# Patient Record
Sex: FEMALE | Race: OTHER | HISPANIC OR LATINO | Employment: UNEMPLOYED | URBAN - METROPOLITAN AREA
[De-identification: names, ages, dates, MRNs, and addresses within clinical notes are randomized per-mention and may not be internally consistent; named-entity substitution may affect disease eponyms.]

---

## 2023-10-01 ENCOUNTER — HOSPITAL ENCOUNTER (EMERGENCY)
Facility: HOSPITAL | Age: 48
Discharge: HOME | End: 2023-10-01

## 2023-10-01 VITALS — TEMPERATURE: 98.4 F | WEIGHT: 200 LBS | BODY MASS INDEX: 32.14 KG/M2 | HEIGHT: 66 IN

## 2023-10-27 ENCOUNTER — HOSPITAL ENCOUNTER (EMERGENCY)
Facility: HOSPITAL | Age: 48
Discharge: HOME | End: 2023-10-28
Payer: COMMERCIAL

## 2023-10-27 VITALS
BODY MASS INDEX: 32.28 KG/M2 | OXYGEN SATURATION: 99 % | HEIGHT: 66 IN | TEMPERATURE: 97.4 F | SYSTOLIC BLOOD PRESSURE: 96 MMHG | HEART RATE: 98 BPM | RESPIRATION RATE: 18 BRPM | DIASTOLIC BLOOD PRESSURE: 69 MMHG

## 2023-10-27 LAB
ALBUMIN SERPL BCP-MCNC: 4.5 G/DL (ref 3.4–5)
ALP SERPL-CCNC: 61 U/L (ref 33–110)
ALT SERPL W P-5'-P-CCNC: 12 U/L (ref 7–45)
ANION GAP SERPL CALC-SCNC: 14 MMOL/L (ref 10–20)
AST SERPL W P-5'-P-CCNC: 22 U/L (ref 9–39)
BASOPHILS # BLD AUTO: 0.07 X10*3/UL (ref 0–0.1)
BASOPHILS NFR BLD AUTO: 0.6 %
BILIRUB SERPL-MCNC: 0.5 MG/DL (ref 0–1.2)
BUN SERPL-MCNC: 15 MG/DL (ref 6–23)
CALCIUM SERPL-MCNC: 10.3 MG/DL (ref 8.6–10.6)
CHLORIDE SERPL-SCNC: 100 MMOL/L (ref 98–107)
CO2 SERPL-SCNC: 30 MMOL/L (ref 21–32)
CREAT SERPL-MCNC: 1 MG/DL (ref 0.5–1.05)
EOSINOPHIL # BLD AUTO: 0.01 X10*3/UL (ref 0–0.7)
EOSINOPHIL NFR BLD AUTO: 0.1 %
ERYTHROCYTE [DISTWIDTH] IN BLOOD BY AUTOMATED COUNT: 13.6 % (ref 11.5–14.5)
GFR SERPL CREATININE-BSD FRML MDRD: 71 ML/MIN/1.73M*2
GLUCOSE SERPL-MCNC: 83 MG/DL (ref 74–99)
HCT VFR BLD AUTO: 38.5 % (ref 36–46)
HGB BLD-MCNC: 13.4 G/DL (ref 12–16)
IMM GRANULOCYTES # BLD AUTO: 0.04 X10*3/UL (ref 0–0.7)
IMM GRANULOCYTES NFR BLD AUTO: 0.3 % (ref 0–0.9)
LYMPHOCYTES # BLD AUTO: 2.26 X10*3/UL (ref 1.2–4.8)
LYMPHOCYTES NFR BLD AUTO: 19.1 %
MCH RBC QN AUTO: 28 PG (ref 26–34)
MCHC RBC AUTO-ENTMCNC: 34.8 G/DL (ref 32–36)
MCV RBC AUTO: 80 FL (ref 80–100)
MONOCYTES # BLD AUTO: 0.64 X10*3/UL (ref 0.1–1)
MONOCYTES NFR BLD AUTO: 5.4 %
NEUTROPHILS # BLD AUTO: 8.84 X10*3/UL (ref 1.2–7.7)
NEUTROPHILS NFR BLD AUTO: 74.5 %
NRBC BLD-RTO: 0 /100 WBCS (ref 0–0)
PLATELET # BLD AUTO: 348 X10*3/UL (ref 150–450)
PMV BLD AUTO: 11.2 FL (ref 7.5–11.5)
POTASSIUM SERPL-SCNC: 4 MMOL/L (ref 3.5–5.3)
PROT SERPL-MCNC: 8 G/DL (ref 6.4–8.2)
RBC # BLD AUTO: 4.79 X10*6/UL (ref 4–5.2)
SODIUM SERPL-SCNC: 140 MMOL/L (ref 136–145)
WBC # BLD AUTO: 11.9 X10*3/UL (ref 4.4–11.3)

## 2023-10-27 PROCEDURE — 36415 COLL VENOUS BLD VENIPUNCTURE: CPT | Performed by: EMERGENCY MEDICINE

## 2023-10-27 PROCEDURE — 99281 EMR DPT VST MAYX REQ PHY/QHP: CPT

## 2023-10-27 PROCEDURE — 85025 COMPLETE CBC W/AUTO DIFF WBC: CPT | Performed by: EMERGENCY MEDICINE

## 2023-10-27 PROCEDURE — 80053 COMPREHEN METABOLIC PANEL: CPT | Performed by: EMERGENCY MEDICINE

## 2023-10-27 PROCEDURE — 99283 EMERGENCY DEPT VISIT LOW MDM: CPT

## 2023-10-27 ASSESSMENT — COLUMBIA-SUICIDE SEVERITY RATING SCALE - C-SSRS
6. HAVE YOU EVER DONE ANYTHING, STARTED TO DO ANYTHING, OR PREPARED TO DO ANYTHING TO END YOUR LIFE?: NO
1. IN THE PAST MONTH, HAVE YOU WISHED YOU WERE DEAD OR WISHED YOU COULD GO TO SLEEP AND NOT WAKE UP?: NO
2. HAVE YOU ACTUALLY HAD ANY THOUGHTS OF KILLING YOURSELF?: NO

## 2023-10-27 NOTE — ED TRIAGE NOTES
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

## 2024-03-08 ENCOUNTER — HOSPITAL ENCOUNTER (EMERGENCY)
Facility: HOSPITAL | Age: 49
Discharge: OTHER INSTITUTION | End: 2024-03-10
Attending: EMERGENCY MEDICINE
Payer: COMMERCIAL

## 2024-03-08 ENCOUNTER — APPOINTMENT (OUTPATIENT)
Dept: CARDIOLOGY | Facility: HOSPITAL | Age: 49
End: 2024-03-08
Payer: COMMERCIAL

## 2024-03-08 DIAGNOSIS — F20.9 SCHIZOPHRENIA, UNSPECIFIED TYPE (MULTI): Primary | ICD-10-CM

## 2024-03-08 DIAGNOSIS — E87.6 HYPOKALEMIA: ICD-10-CM

## 2024-03-08 LAB
ALBUMIN SERPL BCP-MCNC: 3.8 G/DL (ref 3.4–5)
ALP SERPL-CCNC: 51 U/L (ref 33–110)
ALT SERPL W P-5'-P-CCNC: 7 U/L (ref 7–45)
ANION GAP SERPL CALC-SCNC: 10 MMOL/L (ref 10–20)
APAP SERPL-MCNC: <10 UG/ML
AST SERPL W P-5'-P-CCNC: 16 U/L (ref 9–39)
BASOPHILS # BLD AUTO: 0.04 X10*3/UL (ref 0–0.1)
BASOPHILS NFR BLD AUTO: 0.5 %
BILIRUB SERPL-MCNC: 0.3 MG/DL (ref 0–1.2)
BUN SERPL-MCNC: 11 MG/DL (ref 6–23)
CALCIUM SERPL-MCNC: 7.4 MG/DL (ref 8.6–10.3)
CHLORIDE SERPL-SCNC: 105 MMOL/L (ref 98–107)
CO2 SERPL-SCNC: 30 MMOL/L (ref 21–32)
CREAT SERPL-MCNC: 0.71 MG/DL (ref 0.5–1.05)
EGFRCR SERPLBLD CKD-EPI 2021: >90 ML/MIN/1.73M*2
EOSINOPHIL # BLD AUTO: 0.01 X10*3/UL (ref 0–0.7)
EOSINOPHIL NFR BLD AUTO: 0.1 %
ERYTHROCYTE [DISTWIDTH] IN BLOOD BY AUTOMATED COUNT: 12.5 % (ref 11.5–14.5)
ETHANOL SERPL-MCNC: <10 MG/DL
FLUAV RNA RESP QL NAA+PROBE: NOT DETECTED
FLUBV RNA RESP QL NAA+PROBE: NOT DETECTED
GLUCOSE SERPL-MCNC: 96 MG/DL (ref 74–99)
HCT VFR BLD AUTO: 34.7 % (ref 36–46)
HGB BLD-MCNC: 11.8 G/DL (ref 12–16)
HOLD SPECIMEN: NORMAL
IMM GRANULOCYTES # BLD AUTO: 0.02 X10*3/UL (ref 0–0.7)
IMM GRANULOCYTES NFR BLD AUTO: 0.2 % (ref 0–0.9)
LYMPHOCYTES # BLD AUTO: 2.3 X10*3/UL (ref 1.2–4.8)
LYMPHOCYTES NFR BLD AUTO: 28.4 %
MCH RBC QN AUTO: 28.9 PG (ref 26–34)
MCHC RBC AUTO-ENTMCNC: 34 G/DL (ref 32–36)
MCV RBC AUTO: 85 FL (ref 80–100)
MONOCYTES # BLD AUTO: 0.61 X10*3/UL (ref 0.1–1)
MONOCYTES NFR BLD AUTO: 7.5 %
NEUTROPHILS # BLD AUTO: 5.11 X10*3/UL (ref 1.2–7.7)
NEUTROPHILS NFR BLD AUTO: 63.3 %
NRBC BLD-RTO: 0 /100 WBCS (ref 0–0)
PLATELET # BLD AUTO: 255 X10*3/UL (ref 150–450)
POTASSIUM SERPL-SCNC: 2.8 MMOL/L (ref 3.5–5.3)
PROT SERPL-MCNC: 6.6 G/DL (ref 6.4–8.2)
RBC # BLD AUTO: 4.08 X10*6/UL (ref 4–5.2)
SALICYLATES SERPL-MCNC: <3 MG/DL
SARS-COV-2 RNA RESP QL NAA+PROBE: NOT DETECTED
SODIUM SERPL-SCNC: 142 MMOL/L (ref 136–145)
WBC # BLD AUTO: 8.1 X10*3/UL (ref 4.4–11.3)

## 2024-03-08 PROCEDURE — 2500000002 HC RX 250 W HCPCS SELF ADMINISTERED DRUGS (ALT 637 FOR MEDICARE OP, ALT 636 FOR OP/ED): Performed by: EMERGENCY MEDICINE

## 2024-03-08 PROCEDURE — 99285 EMERGENCY DEPT VISIT HI MDM: CPT

## 2024-03-08 PROCEDURE — 80143 DRUG ASSAY ACETAMINOPHEN: CPT | Performed by: EMERGENCY MEDICINE

## 2024-03-08 PROCEDURE — 80053 COMPREHEN METABOLIC PANEL: CPT | Performed by: EMERGENCY MEDICINE

## 2024-03-08 PROCEDURE — 85025 COMPLETE CBC W/AUTO DIFF WBC: CPT | Performed by: EMERGENCY MEDICINE

## 2024-03-08 PROCEDURE — 36415 COLL VENOUS BLD VENIPUNCTURE: CPT | Performed by: EMERGENCY MEDICINE

## 2024-03-08 PROCEDURE — 93005 ELECTROCARDIOGRAM TRACING: CPT

## 2024-03-08 PROCEDURE — 96372 THER/PROPH/DIAG INJ SC/IM: CPT

## 2024-03-08 PROCEDURE — 87636 SARSCOV2 & INF A&B AMP PRB: CPT | Performed by: EMERGENCY MEDICINE

## 2024-03-08 PROCEDURE — 2500000004 HC RX 250 GENERAL PHARMACY W/ HCPCS (ALT 636 FOR OP/ED): Performed by: EMERGENCY MEDICINE

## 2024-03-08 RX ORDER — TRAZODONE HYDROCHLORIDE 50 MG/1
50 TABLET ORAL NIGHTLY
COMMUNITY

## 2024-03-08 RX ORDER — POTASSIUM CHLORIDE 20 MEQ/1
40 TABLET, EXTENDED RELEASE ORAL ONCE
Status: COMPLETED | OUTPATIENT
Start: 2024-03-08 | End: 2024-03-08

## 2024-03-08 RX ORDER — OLANZAPINE 10 MG/1
10 TABLET ORAL NIGHTLY
COMMUNITY

## 2024-03-08 RX ORDER — DIVALPROEX SODIUM 500 MG/1
1000 TABLET, DELAYED RELEASE ORAL NIGHTLY
COMMUNITY

## 2024-03-08 RX ORDER — ZIPRASIDONE MESYLATE 20 MG/ML
20 INJECTION, POWDER, LYOPHILIZED, FOR SOLUTION INTRAMUSCULAR ONCE
Status: COMPLETED | OUTPATIENT
Start: 2024-03-08 | End: 2024-03-08

## 2024-03-08 RX ADMIN — POTASSIUM CHLORIDE 40 MEQ: 1500 TABLET, EXTENDED RELEASE ORAL at 21:47

## 2024-03-08 RX ADMIN — ZIPRASIDONE MESYLATE 20 MG: 20 INJECTION, POWDER, LYOPHILIZED, FOR SOLUTION INTRAMUSCULAR at 19:52

## 2024-03-08 SDOH — HEALTH STABILITY: MENTAL HEALTH: NEEDS EXPRESSED: DENIES

## 2024-03-08 SDOH — HEALTH STABILITY: MENTAL HEALTH: DELUSIONS: RELIGIOUS

## 2024-03-08 SDOH — HEALTH STABILITY: MENTAL HEALTH: HAVE YOU EVER DONE ANYTHING, STARTED TO DO ANYTHING, OR PREPARED TO DO ANYTHING TO END YOUR LIFE?: NO

## 2024-03-08 SDOH — HEALTH STABILITY: MENTAL HEALTH: BEHAVIORS/MOOD: CALM

## 2024-03-08 SDOH — HEALTH STABILITY: MENTAL HEALTH: HAVE YOU ACTUALLY HAD ANY THOUGHTS OF KILLING YOURSELF?: NO

## 2024-03-08 SDOH — HEALTH STABILITY: MENTAL HEALTH: SUICIDE ASSESSMENT: ADULT (C-SSRS)

## 2024-03-08 SDOH — HEALTH STABILITY: MENTAL HEALTH: BEHAVIORS/MOOD: DEFIANT;FLAT AFFECT;GUARDED;NONVERBAL;NON-COMPLIANT;NOT INTERACTIVE;UNCOOPERATIVE

## 2024-03-08 SDOH — HEALTH STABILITY: MENTAL HEALTH: BEHAVIORS/MOOD: DEFIANT;GUARDED;NON-COMPLIANT;UNCOOPERATIVE;WITHDRAWN;MANIPULATIVE

## 2024-03-08 SDOH — HEALTH STABILITY: MENTAL HEALTH: BEHAVIORS/MOOD: DEFIANT;GUARDED;NON-COMPLIANT;NOT INTERACTIVE;UNCOOPERATIVE;MANIPULATIVE

## 2024-03-08 SDOH — HEALTH STABILITY: MENTAL HEALTH: HALLUCINATION: AUDITORY

## 2024-03-08 SDOH — HEALTH STABILITY: MENTAL HEALTH: BEHAVIORS/MOOD: ANXIOUS;DEFIANT;FEARFUL;GUARDED;PARANOID;PACING;SAD;NON-COMPLIANT;UNCOOPERATIVE

## 2024-03-08 SDOH — HEALTH STABILITY: MENTAL HEALTH: HAVE YOU WISHED YOU WERE DEAD OR WISHED YOU COULD GO TO SLEEP AND NOT WAKE UP?: NO

## 2024-03-08 ASSESSMENT — COLUMBIA-SUICIDE SEVERITY RATING SCALE - C-SSRS
1. IN THE PAST MONTH, HAVE YOU WISHED YOU WERE DEAD OR WISHED YOU COULD GO TO SLEEP AND NOT WAKE UP?: NO
2. HAVE YOU ACTUALLY HAD ANY THOUGHTS OF KILLING YOURSELF?: NO
6. HAVE YOU EVER DONE ANYTHING, STARTED TO DO ANYTHING, OR PREPARED TO DO ANYTHING TO END YOUR LIFE?: NO

## 2024-03-08 ASSESSMENT — LIFESTYLE VARIABLES
EVER HAD A DRINK FIRST THING IN THE MORNING TO STEADY YOUR NERVES TO GET RID OF A HANGOVER: NO
EVER FELT BAD OR GUILTY ABOUT YOUR DRINKING: NO
HAVE PEOPLE ANNOYED YOU BY CRITICIZING YOUR DRINKING: NO
HAVE YOU EVER FELT YOU SHOULD CUT DOWN ON YOUR DRINKING: NO

## 2024-03-08 ASSESSMENT — PAIN - FUNCTIONAL ASSESSMENT: PAIN_FUNCTIONAL_ASSESSMENT: 0-10

## 2024-03-08 ASSESSMENT — PAIN SCALES - GENERAL: PAINLEVEL_OUTOF10: 0 - NO PAIN

## 2024-03-08 NOTE — ED PROVIDER NOTES
"HPI   Chief Complaint   Patient presents with    Psychiatric Evaluation     Pt BIBA for [facility] after calling family and stating that everyone is going to die and it will all be their faults - Pt stated that she was also going to die today then left the home she shares with her mom & sister to walk to the hospital - PD were called by family who caught up with her on her walk to take her the rest of the way here - Pt usually goes [facility] - Pt's mom states that this happens a lot when she is not taking her meds        Patient is a 48-year-old female, medical history significant for Arroyo's disease and schizophrenia, presents to the emergency department abnormal behavior.  Apparently, the patient's mother called police today.  She has been noncompliant with her medications.  She had been saying some abnormal things like \"today everyone is going to die\".  Just acting erratically.  She states that she has not been taking any of her psychiatric medications.  She has been compliant with her Arroyo's medications.  She denies nausea or vomiting.  Patient would not give much history.                          [place] Coma Scale Score: 15                     Patient History   No past medical history on file.  No past surgical history on file.  No family history on file.  Social History     Tobacco Use    Smoking status: Not on file    Smokeless tobacco: Not on file   Substance Use Topics    Alcohol use: Not on file    Drug use: Not on file       Physical Exam   ED Triage Vitals   Temp Pulse Resp BP   -- -- -- --      SpO2 Temp src Heart Rate Source Patient Position   -- -- -- --      BP Location FiO2 (%)     -- --       Physical Exam  Vitals and nursing note reviewed.   Constitutional:       General: She is not in acute distress.     Appearance: She is well-developed.   HENT:      Head: Normocephalic and atraumatic.   Eyes:      Conjunctiva/sclera: Conjunctivae normal.   Cardiovascular:      Rate and Rhythm: Normal rate " and regular rhythm.      Heart sounds: No murmur heard.  Pulmonary:      Effort: Pulmonary effort is normal. No respiratory distress.      Breath sounds: Normal breath sounds.   Abdominal:      Palpations: Abdomen is soft.      Tenderness: There is no abdominal tenderness.   Musculoskeletal:         General: No swelling.      Cervical back: Neck supple.   Skin:     General: Skin is warm and dry.      Capillary Refill: Capillary refill takes less than 2 seconds.   Neurological:      Mental Status: She is alert.   Psychiatric:         Mood and Affect: Mood normal. Affect is flat.         Speech: Speech is delayed.         Behavior: Behavior is withdrawn.         ED Course & MDM   ED Course as of 03/08/24 2341   Fri Mar 08, 2024   2130 CBC unremarkable. [MK]   2130 Metabolic panel demonstrates hypokalemia which will be replaced. []   2130 Toxicology negative. []   2208 COVID and flu negative. []      ED Course User Index  [MK] Omar Hassan MD         Diagnoses as of 03/08/24 2341   Schizophrenia, unspecified type (CMS/HCC)   Hypokalemia       Medical Decision Making  Medical Decision Making: Patient presents to the emergency department with abnormal behavior.  Per family, she has not been compliant with her medications.  She has been acting erratically.  Initially, patient did attempt to refuse care, but I do not feel she has capacity to make decisions and is not safe.  She was given Geodon and metabolic workup was pursued.  She is found to be hypokalemic which was replaced.  She has no tachycardia.  Her renal function is normal.  This does not represent Melchor's crisis.  At this point, patient is medically cleared for inpatient psychiatric hospitalization.    EKG interpreted by myself (ED attending physician): Sinus rhythm.  Rate of 80.  Normal axis and intervals.  Mildly prolonged QT, unchanged from prior.    Differential Diagnoses Considered: Decompensated schizophrenia, medication noncompliance,  Melchor's crisis    Chronic Medical Conditions Significantly Affecting Care: Schizophrenia, Melcohr's disease    External Records Reviewed: I reviewed recent and relevant outside records including: Multiple recent emergency department evaluations    Independent Interpretation of Studies:  I independently interpreted: No imaging    Escalation of Care:  Appropriate for EPAT evaluation    Social Determinants of Health Significantly Affecting Care:  Medication noncompliant    Prescription Drug Consideration: Geodon, potassium    Diagnostic testing considered: Noncontributory    Discussion of Management with Other Providers:   I discussed the patient/results with: [admitting team, consultant, radiologist, social work, EPAT, case management, PT/OT, RT, PCP, etc.]          Procedure  Procedures     Omar Hassan MD  03/08/24 0336

## 2024-03-09 LAB
AMPHETAMINES UR QL SCN: NORMAL
ANION GAP SERPL CALC-SCNC: 12 MMOL/L (ref 10–20)
BARBITURATES UR QL SCN: NORMAL
BENZODIAZ UR QL SCN: NORMAL
BUN SERPL-MCNC: 13 MG/DL (ref 6–23)
BZE UR QL SCN: NORMAL
CALCIUM SERPL-MCNC: 8 MG/DL (ref 8.6–10.3)
CANNABINOIDS UR QL SCN: NORMAL
CHLORIDE SERPL-SCNC: 103 MMOL/L (ref 98–107)
CO2 SERPL-SCNC: 31 MMOL/L (ref 21–32)
CREAT SERPL-MCNC: 0.81 MG/DL (ref 0.5–1.05)
EGFRCR SERPLBLD CKD-EPI 2021: >90 ML/MIN/1.73M*2
FENTANYL+NORFENTANYL UR QL SCN: NORMAL
GLUCOSE SERPL-MCNC: 90 MG/DL (ref 74–99)
HCG UR QL IA.RAPID: NEGATIVE
METHADONE UR QL SCN: NORMAL
OPIATES UR QL SCN: NORMAL
OXYCODONE+OXYMORPHONE UR QL SCN: NORMAL
PCP UR QL SCN: NORMAL
POTASSIUM SERPL-SCNC: 3.6 MMOL/L (ref 3.5–5.3)
SODIUM SERPL-SCNC: 142 MMOL/L (ref 136–145)

## 2024-03-09 PROCEDURE — 81025 URINE PREGNANCY TEST: CPT | Performed by: EMERGENCY MEDICINE

## 2024-03-09 PROCEDURE — 80307 DRUG TEST PRSMV CHEM ANLYZR: CPT | Performed by: EMERGENCY MEDICINE

## 2024-03-09 PROCEDURE — 82310 ASSAY OF CALCIUM: CPT | Performed by: EMERGENCY MEDICINE

## 2024-03-09 PROCEDURE — 2500000002 HC RX 250 W HCPCS SELF ADMINISTERED DRUGS (ALT 637 FOR MEDICARE OP, ALT 636 FOR OP/ED): Performed by: INTERNAL MEDICINE

## 2024-03-09 PROCEDURE — 2500000004 HC RX 250 GENERAL PHARMACY W/ HCPCS (ALT 636 FOR OP/ED): Performed by: INTERNAL MEDICINE

## 2024-03-09 PROCEDURE — 2500000001 HC RX 250 WO HCPCS SELF ADMINISTERED DRUGS (ALT 637 FOR MEDICARE OP): Performed by: INTERNAL MEDICINE

## 2024-03-09 PROCEDURE — 36415 COLL VENOUS BLD VENIPUNCTURE: CPT | Performed by: EMERGENCY MEDICINE

## 2024-03-09 RX ORDER — HYDROCORTISONE 10 MG/1
15 TABLET ORAL DAILY
Status: DISCONTINUED | OUTPATIENT
Start: 2024-03-09 | End: 2024-03-10 | Stop reason: HOSPADM

## 2024-03-09 RX ORDER — LORAZEPAM 2 MG/1
2 TABLET ORAL ONCE AS NEEDED
Status: COMPLETED | OUTPATIENT
Start: 2024-03-09 | End: 2024-03-10

## 2024-03-09 RX ORDER — FLUDROCORTISONE ACETATE 0.1 MG/1
0.1 TABLET ORAL DAILY
Status: DISCONTINUED | OUTPATIENT
Start: 2024-03-09 | End: 2024-03-10 | Stop reason: HOSPADM

## 2024-03-09 RX ORDER — OLANZAPINE 10 MG/2ML
10 INJECTION, POWDER, FOR SOLUTION INTRAMUSCULAR ONCE AS NEEDED
Status: DISCONTINUED | OUTPATIENT
Start: 2024-03-09 | End: 2024-03-10 | Stop reason: HOSPADM

## 2024-03-09 RX ORDER — LORAZEPAM 2 MG/ML
2 INJECTION INTRAMUSCULAR ONCE AS NEEDED
Status: COMPLETED | OUTPATIENT
Start: 2024-03-09 | End: 2024-03-10

## 2024-03-09 RX ORDER — POTASSIUM CHLORIDE 20 MEQ/1
40 TABLET, EXTENDED RELEASE ORAL ONCE
Status: COMPLETED | OUTPATIENT
Start: 2024-03-09 | End: 2024-03-09

## 2024-03-09 RX ORDER — LANOLIN ALCOHOL/MO/W.PET/CERES
800 CREAM (GRAM) TOPICAL ONCE
Status: COMPLETED | OUTPATIENT
Start: 2024-03-09 | End: 2024-03-09

## 2024-03-09 RX ORDER — CALCITRIOL 0.25 UG/1
0.5 CAPSULE ORAL DAILY
Status: DISCONTINUED | OUTPATIENT
Start: 2024-03-09 | End: 2024-03-10 | Stop reason: HOSPADM

## 2024-03-09 RX ORDER — OLANZAPINE 5 MG/1
10 TABLET ORAL ONCE
Status: DISCONTINUED | OUTPATIENT
Start: 2024-03-09 | End: 2024-03-10 | Stop reason: HOSPADM

## 2024-03-09 RX ADMIN — CALCITRIOL CAPSULES 0.25 MCG 0.5 MCG: 0.25 CAPSULE ORAL at 12:21

## 2024-03-09 RX ADMIN — HYDROCORTISONE 15 MG: 10 TABLET ORAL at 12:21

## 2024-03-09 RX ADMIN — FLUDROCORTISONE ACETATE 0.1 MG: 0.1 TABLET ORAL at 12:23

## 2024-03-09 RX ADMIN — MAGNESIUM OXIDE TAB 400 MG (241.3 MG ELEMENTAL MG) 800 MG: 400 (241.3 MG) TAB at 10:10

## 2024-03-09 RX ADMIN — POTASSIUM CHLORIDE 40 MEQ: 1500 TABLET, EXTENDED RELEASE ORAL at 10:10

## 2024-03-09 SDOH — HEALTH STABILITY: MENTAL HEALTH: BEHAVIORS/MOOD: CALM;COOPERATIVE

## 2024-03-09 SDOH — HEALTH STABILITY: MENTAL HEALTH: BEHAVIORS/MOOD: ANXIOUS;COOPERATIVE

## 2024-03-09 SDOH — HEALTH STABILITY: MENTAL HEALTH: ANXIETY SYMPTOMS: NO PROBLEMS REPORTED OR OBSERVED.

## 2024-03-09 SDOH — SOCIAL STABILITY: SOCIAL NETWORK: PARENT/GUARDIAN/SIGNIFICANT OTHER INVOLVEMENT: NO INVOLVEMENT

## 2024-03-09 SDOH — HEALTH STABILITY: MENTAL HEALTH: IN THE PAST FEW WEEKS, HAVE YOU FELT THAT YOU OR YOUR FAMILY WOULD BE BETTER OFF IF YOU WERE DEAD?: NO

## 2024-03-09 SDOH — HEALTH STABILITY: MENTAL HEALTH: BEHAVIORS/MOOD: CALM

## 2024-03-09 SDOH — HEALTH STABILITY: MENTAL HEALTH: HALLUCINATION: OTHER (COMMENT)

## 2024-03-09 SDOH — HEALTH STABILITY: MENTAL HEALTH: SLEEP PATTERN: DISTURBED/INTERRUPTED SLEEP;RESTLESSNESS

## 2024-03-09 SDOH — HEALTH STABILITY: MENTAL HEALTH: DELUSIONS: RELIGIOUS

## 2024-03-09 SDOH — HEALTH STABILITY: MENTAL HEALTH: BEHAVIORS/MOOD: CALM;CONGRUENT

## 2024-03-09 SDOH — HEALTH STABILITY: MENTAL HEALTH: NON-SPECIFIC ACTIVE SUICIDAL THOUGHTS (PAST 1 MONTH): NO

## 2024-03-09 SDOH — HEALTH STABILITY: MENTAL HEALTH: BEHAVIORS/MOOD: SLEEPING

## 2024-03-09 SDOH — HEALTH STABILITY: MENTAL HEALTH: HAVE YOU WISHED YOU WERE DEAD OR WISHED YOU COULD GO TO SLEEP AND NOT WAKE UP?: NO

## 2024-03-09 SDOH — HEALTH STABILITY: MENTAL HEALTH: HAVE YOU EVER DONE ANYTHING, STARTED TO DO ANYTHING, OR PREPARED TO DO ANYTHING TO END YOUR LIFE?: NO

## 2024-03-09 SDOH — HEALTH STABILITY: MENTAL HEALTH: ARE YOU HAVING THOUGHTS OF KILLING YOURSELF RIGHT NOW?: NO

## 2024-03-09 SDOH — HEALTH STABILITY: MENTAL HEALTH: IN THE PAST FEW WEEKS, HAVE YOU WISHED YOU WERE DEAD?: NO

## 2024-03-09 SDOH — ECONOMIC STABILITY: HOUSING INSECURITY: FEELS SAFE LIVING IN HOME: YES

## 2024-03-09 SDOH — HEALTH STABILITY: MENTAL HEALTH: CONTENT: RELIGIOSITY

## 2024-03-09 SDOH — ECONOMIC STABILITY: GENERAL: FINANCIAL CONCERNS: UNABLE TO MEET BASIC NEEDS

## 2024-03-09 SDOH — HEALTH STABILITY: MENTAL HEALTH: BEHAVIORS/MOOD: ANXIOUS;COOPERATIVE;IMPULSIVE;PACING

## 2024-03-09 SDOH — HEALTH STABILITY: MENTAL HEALTH: HAVE YOU ACTUALLY HAD ANY THOUGHTS OF KILLING YOURSELF?: NO

## 2024-03-09 SDOH — HEALTH STABILITY: MENTAL HEALTH: BEHAVIORS/MOOD: IMPULSIVE;PACING;DELUSIONS

## 2024-03-09 SDOH — HEALTH STABILITY: MENTAL HEALTH: BEHAVIORS/MOOD: CONGRUENT;COOPERATIVE

## 2024-03-09 SDOH — HEALTH STABILITY: MENTAL HEALTH: SUICIDAL BEHAVIOR (LIFETIME): NO

## 2024-03-09 SDOH — HEALTH STABILITY: MENTAL HEALTH: DEPRESSION SYMPTOMS: NO PROBLEMS REPORTED OR OBSERVED.

## 2024-03-09 SDOH — HEALTH STABILITY: MENTAL HEALTH: HAVE YOU EVER TRIED TO KILL YOURSELF?: NO

## 2024-03-09 SDOH — HEALTH STABILITY: MENTAL HEALTH: WISH TO BE DEAD (PAST 1 MONTH): NO

## 2024-03-09 SDOH — HEALTH STABILITY: MENTAL HEALTH: IN THE PAST WEEK, HAVE YOU BEEN HAVING THOUGHTS ABOUT KILLING YOURSELF?: NO

## 2024-03-09 ASSESSMENT — LIFESTYLE VARIABLES
PRESCIPTION_ABUSE_PAST_12_MONTHS: NO
SUBSTANCE_ABUSE_PAST_12_MONTHS: NO

## 2024-03-09 ASSESSMENT — PAIN SCALES - GENERAL
PAINLEVEL_OUTOF10: 0 - NO PAIN
PAINLEVEL_OUTOF10: 0 - NO PAIN

## 2024-03-09 NOTE — PROGRESS NOTES
Emergency Medicine Transition of Care Note.    I received Kriss Malloy in signout from Dr. Zhu.  Please see the previous ED provider note for all HPI, PE and MDM up to the time of signout at 0700. This is in addition to the primary record.    In brief Kriss Malloy is an 48 y.o. female presenting for   Chief Complaint   Patient presents with    Psychiatric Evaluation     Pt BIBA for pych eval after calling family and stating that everyone is going to die and it will all be their faults - Pt stated that she was also going to die today then left the home she shares with her mom & sister to walk to the hospital - PD were called by family who caught up with her on her walk to take her the rest of the way here - Pt usually goes to [facility] - Pt's mom states that this happens a lot when she is not taking her meds      At the time of signout we were awaiting: Evaluation by EPAT.    ED Course as of 03/09/24 1558   Fri Mar 08, 2024   2130 CBC unremarkable. [MK]   2130 Metabolic panel demonstrates hypokalemia which will be replaced. [MK]   2130 Toxicology negative. [MK]   2208 COVID and flu negative. [MK]   Sat Mar 09, 2024   1110 Reassessed patient.  Patient pacing in the room.  Concern for manic behavior.  Patient states she is been taking medication for [place]'s disease.  Unclear as to whether or not the patient is taking other medication.  Patient mentions Voodoo affecting decision making this time. [JA]   1111 Discussed with EPAT and they will work on placement for the patient. [JA]      ED Course User Index  [JA] Waylon Booker DO  [MK] Omar Hassan MD         Diagnoses as of 03/09/24 1558   Schizophrenia, unspecified type (CMS/HCC)   Hypokalemia       Medical Decision Making  Reassessed patient.  Patient with manic behavior.  Patient pacing in room and repeating herself.    Patient denies suicidal ideation or homicidal ideation however he has not been taking her medication.  Patient notes she only  "takes her medication for Melchor's disease.    Capacity Assessment Tool    \"Capacity\" is the \"ability\" to make a decision.  The decision in question must be specific (one decision), relevant to a patient's current condition (appropriate), and timely (neither prospective nor retrospective).    Capacity varies based on knowledge base (explanation/understanding of clinical information), cognitive processing, acute psychiatric illness, and other clinical conditions.    In order to be deemed \"capacitated\" to make a single decision at one point in time, a patient must demonstrate all 4 of the following elements:    *Ability to consistently communicate a choice (consistent over time with adequate information)  *Ability to understand the relevant information (accurate knowledge of condition)  *Ability to appreciate the situation and its consequences (risks/benefits, pros/cons)  *Ability to reason about treatment options (without undue influence of a person or condition, eg. suicidality or acute psychosis)      Current Decision    Clinical issue:   Danger to herself    Did the appropriate team address relevant information with the patient:  Yes    Date: 3/9/24    If \"NO\" is selected for appropriate team, then please discuss with the appropriate team.  The appropriate team should be encouraged to address relevant information with the patient AND reevaluate capacity when appropriate.    Capacity Evaluation    Patient demonstrates ability to consistently communicate choice:  Yes patient able to communicate wishes    Patient demonstrates ability to understand the relevant information:  No, unclear as to whether or not patient comprehends events    Patient demonstrates ability to appreciate the situation and its consequences:  No, patient does not appreciate detriment of not taking her medication    Patient demonstrates ability to reason about treatment options:  No, patient does not see difference between treatment options    If " "ANY of the above items are answered \"NO,\" the patient LACKS CAPACITY for that specific decision at hand, at that specific time.  Further capacity evaluations can be done as needed.           Patient care transferred to oncoming physician pending placement by EPAT.    Final diagnoses:   [F20.9] Schizophrenia, unspecified type (CMS/[facility])   [E87.6] Hypokalemia           Procedure  Procedures    Waylon Booker DO   "

## 2024-03-09 NOTE — PROGRESS NOTES
"EPAT - Social Work Psychiatric Assessment    Arrival Details  Mode of Arrival: Ambulatory  Admission Source: Emergency department  Admission Type: Voluntary  EPAT Assessment Start Date: 03/09/24  EPAT Assessment Start Time: 0822  Name of : HEIDY Sweet MSSA    History of Present Illness  Admission Reason: Psych Eval  HPI: Patient is 47yo female presenting to ED for psych eval. Reviewed chart hx and provider note. Patient mental health history includes schizophrenia. Patient is [id number] complaint. Patient also presents with Addisions disease with hx of diasease being uncorntrolled. Patient typically follows at [facility]. No outpatient services at this time. Patient negative for substances. Denies SI/HI/AH/VH. Patient here today due to sister calling police as she reports patient has been declining mentally. Patient has history of inpatient admissions, with last admission a year ago for psychosis. [place] low risk.     Readmission Information   Readmission within 30 Days: No    Psychiatric Symptoms  Anxiety Symptoms: No problems reported or observed.  Depression Symptoms: No problems reported or observed.  Sherrie Symptoms: Flight of ideas    Psychosis Symptoms  Hallucination Type: No problems reported or observed.  Delusion Type: No problems reported or observed.    Additional Symptoms - Adult  Generalized Anxiety Disorder: No problems reported or observed.  Obsessive Compulsive Disorder: No problems reported or observed.  Panic Attack: No problems reported or observed.  Post Traumatic Stress Disorder: Traumatic event  Delirium: No problems reported or observed.    Past Psychiatric History/Meds/Treatments  Past Psychiatric History: Schizophrenia  Past Psychiatric Meds/Treatments: \"Many Meds\" patient does not recall names  Past Violence/Victimization History: Patient reports when she was little-    Current Mental Health Contacts   Name/Phone Number: None reported   Last Appointment " Date: none reported  Provider Name/Phone Number: none reported  Provider Last Appointment Date: none reported    Support System: Immediate family    Living Arrangement: House, Lives with someone    Home Safety  Feels Safe Living in Home: Yes  Potentially Unsafe Housing Conditions: Other (Comment) (none)  Home Safety : none reported    Income Information  Employment Status for: Patient  Employment Status: Unemployed  Income Source: Unemployed  Current/Previous Occupation: Healthcare  Income/Expense Information: Expenses exceed income  Financial Concerns: Unable to Meet Basic Needs  Who Manages Finances if Patient Unable: Patient  Employment/ Finance Comments: Patient gets money from parents/family    Miltary Service/Education History  Current or Previous  Service: None   Experience:  (none)  Education Level: High school  History of Learning Problems: No  History of School Behavior Problems: No  School History: unreported    Social/Cultural History  Social History: Patient is 47yo AA female,presents avergae to [facility] average weight, appears stated age, hair dishelveled, hospital gowns  Cultural Requests During Hospitalization: unreported  Spiritual Requests During Hospitalization: patient reports recently joing an [employer]  Important Activities: Family, Social    Legal  Legal Considerations: Patient/ Family Ability to Make Healthcare Decisions  Assistance with Managing/Advocating Healthcare Needs: Other (Comment) (n/a)  Criminal Activity/ Legal Involvement Pertinent to Current Situation/ Hospitalization: none  Legal Concerns: unreported  Legal Comments: unreported    Drug Screening  Have you used any substances (canabis, cocaine, heroin, hallucinogens, inhalants, etc.) in the past 12 months?: No  Have you used any prescription drugs other than prescribed in the past 12 months?: No  Is a toxicology screen needed?: No    Stage of Change  Stage of Change: Precontemplation  History of Treatment:  Other (Comment) (none)  Type of Treatment Offered: Other (Comment) (none offered)  Treatment Offered: Declined  Duration of Substance Use: unreported  Frequency of Substance Use: unreported  Age of First Substance Use: 20's    Psychosocial  Needs Expressed: Denies    Orientation  Orientation Level: Oriented X4    General Appearance  Motor Activity: Unremarkable  Speech Pattern: Rapid, Pressured  General Attitude: Cooperative  Appearance/Hygiene: Disheveled    Thought Process  Coherency: Flight of ideas  Content: Religiosity  Delusions: Taoism  Perception: Not altered  Hallucination: None  Judgment/Insight: Limited  Confusion: None  Cognition: Poor judgement    Sleep Pattern  Sleep Pattern: Disturbed/interrupted sleep    Risk Factors  Self Harm/Suicidal Ideation Plan: Declined; no hx of SI or attempts  Previous Self Harm/Suicidal Plans: none identified  Risk Factors: Major mental illness, Unemployment  Description of Thoughts/Ideas Leaving Unit Now: Patient    Violence Risk Assessment  Assessment of Violence: None noted  Thoughts of Harm to Others: No    Ability to Assess Risk Screen  Risk Screen - Ability to Assess: Able to be screened  Ask Suicide-Screening Questions  1. In the past few weeks, have you wished you were dead?: No  2. In the past few weeks, have you felt that you or your family would be better off if you were dead?: No  3. In the past week, have you been having thoughts about killing yourself?: No  4. Have you ever tried to kill yourself?: No  5. Are you having thoughts of killing yourself right now?: No  Calculated Risk Score: No intervention is necessary  [place] Suicide Severity Rating Scale (Screener/Recent Self-Report)  1. Wish to be Dead (Past 1 Month): No  2. Non-Specific Active Suicidal Thoughts (Past 1 Month): No  6. Suicidal Behavior (Lifetime): No  Calculated C-SSRS Risk Score (Lifetime/Recent): No Risk Indicated  Step 1: Risk Factors  Current & Past Psychiatric Dx: Psychotic  "disorder  Presenting Symptoms: Impulsivity  Family History: Other (Comment) (Hx of PTSD and mental health in family)  Precipitants/Stressors: Triggering events leading to humiliation, shame, and/or despair (e.g. loss of relationship, financial or health status) (real or anticipated)  Change in Treatment: Non-compliant or not receiving treatment  Access to Lethal Methods : No  Step 2: Protective Factors   Protective Factors Internal: Identifies reasons for living, Hinduism beliefs, Ability to cope with stress  Protective Factors External: Cultural, spiritual and/or moral attitudes against suicide  Step 3: Suicidal Ideation Intensity  Most Severe Suicidal Ideation Identified: None identified  How Many Times Have You Had These Thoughts: Less than once a week  When You Have the Thoughts How Long do They Last : Fleeting - few seconds or minutes  Could/Can You Stop Thinking About Killing Yourself or Wanting to Die if You Want to: Does not attempt to control thoughts  Are There Things - Anyone or Anything - That Stopped You From Wanting to Die or Acting on: Does not apply  What Sort of Reasons Did You Have For Thinking About Wanting to Die or Killing Yourself: Does not apply  Total Score: 2  Step 5: Documentation  Risk Level: Low suicide risk    Psychiatric Impression and Plan of Care  Assessment and Plan: Upon assessment, patient sitting up ,cooperative during assessment. Patient states that she was having some \"personal porblems\" and sister took this as patient possibly having suicidal ideation. Patient states she has not schizophrenia as she is \"cured\" due to new Pentecostal. She does not take any meds. Denies SI/HI. States she would like to go back to shelter to get help with housing and employment. Patient presents with mild delusions when dsicussing her Pentecostal and cure of mental health. Sister reports that patient has been staying with her for 2 months and she has been decompensating due to not taking her meds such " as talking to self in loud voice. Sister is concerned patient isnt taking meds for Addisons disease either. Sister confirms no hx of suicide attempts or suicidal ideation. Patient has not had any threats of harm to herself or anyone around her at this time. After further review, provider feels patient is not safe for discharge at this time, thus patient will be placed for psychiatric stabilization.  Specific Resources Provided to Patient: none given  CM Notified: none given  PHP/IOP Recommended: none given  Specific Information Provided for PHP/IOP: none given  Plan Comments: admit    Outcome/Disposition  Patient's Perception of Outcome Achieved: Agreeable to discharge  Assessment, Recommendations and Risk Level Reviewed with: Dr. Waylon Adame  Contact Name: Freda Malloy  Contact Number(s): [phone]  Contact Relationship: sister  EPAT Assessment Completed Date: 03/09/24  EPAT Assessment Completed Time: 1037  Patient Disposition:  Adult Inpatient Psych    Social Work Note

## 2024-03-10 VITALS
BODY MASS INDEX: 20.4 KG/M2 | SYSTOLIC BLOOD PRESSURE: 114 MMHG | HEIGHT: 67 IN | OXYGEN SATURATION: 100 % | DIASTOLIC BLOOD PRESSURE: 68 MMHG | WEIGHT: 130 LBS | RESPIRATION RATE: 16 BRPM | HEART RATE: 70 BPM | TEMPERATURE: 97.7 F

## 2024-03-10 PROCEDURE — 2500000001 HC RX 250 WO HCPCS SELF ADMINISTERED DRUGS (ALT 637 FOR MEDICARE OP): Performed by: EMERGENCY MEDICINE

## 2024-03-10 RX ADMIN — LORAZEPAM 2 MG: 2 TABLET ORAL at 07:41

## 2024-03-10 SDOH — HEALTH STABILITY: MENTAL HEALTH: BEHAVIORS/MOOD: PACING;ANXIOUS;COOPERATIVE

## 2024-03-10 SDOH — HEALTH STABILITY: MENTAL HEALTH: BEHAVIORS/MOOD: SLEEPING

## 2024-03-10 SDOH — HEALTH STABILITY: MENTAL HEALTH: SLEEP PATTERN: EARLY AWAKENING

## 2024-03-10 SDOH — HEALTH STABILITY: MENTAL HEALTH: BEHAVIORS/MOOD: ANXIOUS;PACING;COOPERATIVE

## 2024-03-10 SDOH — HEALTH STABILITY: MENTAL HEALTH: BEHAVIORS/MOOD: AGITATED;PACING;COOPERATIVE;FLIGHT OF IDEAS

## 2024-03-10 SDOH — HEALTH STABILITY: MENTAL HEALTH: BEHAVIORS/MOOD: ANXIOUS;COOPERATIVE;FLIGHT OF IDEAS;PACING

## 2024-03-10 SDOH — HEALTH STABILITY: MENTAL HEALTH: BEHAVIORS/MOOD: ANXIOUS;APPROPRIATE FOR SITUATION;CALM;COOPERATIVE;FEARFUL;PACING;TEARFUL;VERBAL;WITHDRAWN

## 2024-03-10 ASSESSMENT — PAIN SCALES - GENERAL: PAINLEVEL_OUTOF10: 0 - NO PAIN

## 2024-03-10 NOTE — PROGRESS NOTES
Emergency Medicine Transition of Care Note.    I received Kriss Malloy in signout from Dr. Zhu.  Please see the previous ED provider note for all HPI, PE and MDM up to the time of signout at 0700. This is in addition to the primary record.    In brief Kriss Malloy is an 48 y.o. female presenting for   Chief Complaint   Patient presents with    Psychiatric Evaluation     Pt BIBA for pych eval after calling family and stating that everyone is going to die and it will all be their faults - Pt stated that she was also going to die today then left the home she shares with her mom & sister to walk to the hospital - PD were called by family who caught up with her on her walk to take her the rest of the way here - Pt usually goes to [facility] - Pt's mom states that this happens a lot when she is not taking her meds      At the time of signout we were awaiting: Placement by EPAT    Reassessed patient.  Patient still displays manic behavior.  Concern for patient's safety and thus discussed with patient further.  Patient agrees with transfer to psychiatric facility    ED Course as of 03/10/24 0843   Fri Mar 08, 2024   2130 CBC unremarkable. [MK]   2130 Metabolic panel demonstrates hypokalemia which will be replaced. [MK]   2130 Toxicology negative. [MK]   2208 COVID and flu negative. [MK]   Sat Mar 09, 2024   1110 Reassessed patient.  Patient pacing in the room.  Concern for manic behavior.  Patient states she is been taking medication for Baker's disease.  Unclear as to whether or not the patient is taking other medication.  Patient mentions Mosque affecting decision making this time. [JA]   1111 Discussed with EPAT and they will work on placement for the patient. [JA]   1907 Repeat metabolic panel demonstrates normalization of potassium. [MK]      ED Course User Index  [JA] Waylon Booker DO  [MK] Omar Hassan MD         Diagnoses as of 03/10/24 0843   Schizophrenia, unspecified type (CMS/HCC)    Hypokalemia       Medical Decision Making      Final diagnoses:   [F20.9] Schizophrenia, unspecified type (CMS/HCC)   [E87.6] Hypokalemia           Procedure  Procedures    Waylon Booker DO

## 2024-03-10 NOTE — SIGNIFICANT EVENT
Application for Emergency Admission      Ready for Transfer?  Is the patient medically cleared for transfer to inpatient psychiatry: Yes  Has the patient been accepted to an inpatient psychiatric hospital: No    Application for Emergency Admission  IN ACCORDANCE WITH [place] 5122[place]10 [place]  The Chief Clinical Officer of: [facility] 3/10/2024 .3:13 AM    Reason for Hospitalization  The undersigned has reason to believe that: Kriss Malloy Is a mentally ill person subject to hospitalization by court order under division B Section 5122.01 of the Revised Code, i.e., this person:    1.No  Represents a substantial risk of physical harm to self as manifested by evidence of threats of, or attempts at, suicide or serious self-inflicted bodily harm    2.No Represents a substantial risk of physical harm to others as manifested by evidence of recent homicidal or other violent behavior, evidence of recent threats that place another in reasonable fear of violent behavior and serious physical harm, or other evidence of present dangerousness    3.Yes Represents a substantial and immediate risk of serious physical impairment or injury to self as manifested by  evidence that the person is unable to provide for and is not providing for the person's basic physical needs because of the person's mental illness and that appropriate provision for those needs cannot be made  immediately available in the community    4.Yes Would benefit from treatment in a hospital for his mental illness and is in need of such treatment as manifested by evidence of behavior that creates a grave and imminent risk to substantial rights of others or  himself.    5.No Would benefit from treatment as manifested by evidence of behavior that indicates all of the following:       (a) The person is unlikely to survive safely in the community without supervision, based on a clinical determination.       (b) The person has a history of lack of compliance  with treatment for mental illness and one of the following applies:      (i) At least twice within the thirty-six months prior to the filing of an affidavit seeking court-ordered treatment of the person under section 5122.111 of the Revised Code, the lack of compliance has been a significant factor in necessitating hospitalization in a hospital or receipt of services in a forensic or other mental health unit of a correctional facility, provided that the thirty-six-month period shall be extended by the length of any hospitalization or incarceration of the person that occurred within the thirty-six-month period.      (ii) Within the forty-eight months prior to the filing of an affidavit seeking court-ordered treatment of the person under section 5122.111 of the Revised Code, the lack of compliance resulted in one or more acts of serious violent behavior toward self or others or threats of, or attempts at, serious physical harm to self or others, provided that the forty-eight-month period shall be extended by the length of any hospitalization or incarceration of the person that occurred within the forty-eight-month period.      (c) The person, as a result of mental illness, is unlikely to voluntarily participate in necessary treatment.       (d) In view of the person's treatment history and current behavior, the person is in need of treatment in order to prevent a relapse or deterioration that would be likely to result in substantial risk of serious harm to the person or others.    (e) Represents a substantial risk of physical harm to self or others if allowed to remain at liberty pending examination.    Therefore, it is requested that said person be admitted to the above named facility.    STATEMENT OF BELIEF    Must be filled out by one of the following: a psychiatrist, licensed physician, licensed clinical psychologist, health or ,  or .  (Statement shall include the circumstances  under which the individual was taken into custody and the reason for the person's belief that hospitalization is necessary. The statement shall also include a reference to efforts made to secure the individual's property at his residence if he was taken into custody there. Every reasonable and appropriate effort should be made to take this person into custody in the least conspicuous manner possible.)    Patient with decompensated schizophrenia.  She is not safe for discharge.  For her safety she will be admitted at a psychiatric facility for further management and stabilization.    Bryce Zhu MD 3/10/2024     _____________________________________________________________   Place of Employment: [facility]    STATEMENT OF OBSERVATION BY PSYCHIATRIST, LICENSED PHYSICIAN, OR LICENSED CLINICAL PSYCHOLOGIST, IF APPLICABLE    Place of Observation (e.g., [facility] center, general hospital, office, emergency facility)  (If applicable, please complete)    Bryce Zhu MD 3/10/2024    _____________________________________________________________

## 2024-03-16 LAB
ATRIAL RATE: 81 BPM
P AXIS: 45 DEGREES
PR INTERVAL: 149 MS
Q ONSET: 253 MS
QRS COUNT: 12 BEATS
QRS DURATION: 98 MS
QT INTERVAL: 470 MS
QTC CALCULATION(BAZETT): 543 MS
QTC FREDERICIA: 517 MS
R AXIS: 41 DEGREES
T AXIS: 48 DEGREES
T OFFSET: 488 MS
VENTRICULAR RATE: 80 BPM

## 2024-08-17 ENCOUNTER — HOSPITAL ENCOUNTER (EMERGENCY)
Facility: HOSPITAL | Age: 49
Discharge: HOME | End: 2024-08-17
Attending: STUDENT IN AN ORGANIZED HEALTH CARE EDUCATION/TRAINING PROGRAM
Payer: COMMERCIAL

## 2024-08-17 ENCOUNTER — PHARMACY VISIT (OUTPATIENT)
Dept: PHARMACY | Facility: CLINIC | Age: 49
End: 2024-08-17
Payer: MEDICAID

## 2024-08-17 VITALS
OXYGEN SATURATION: 96 % | WEIGHT: 100 LBS | SYSTOLIC BLOOD PRESSURE: 87 MMHG | BODY MASS INDEX: 16.07 KG/M2 | TEMPERATURE: 98.4 F | HEIGHT: 66 IN | HEART RATE: 70 BPM | RESPIRATION RATE: 16 BRPM | DIASTOLIC BLOOD PRESSURE: 56 MMHG

## 2024-08-17 DIAGNOSIS — E27.1 ADDISON'S DISEASE (MULTI): Primary | ICD-10-CM

## 2024-08-17 LAB
ALBUMIN SERPL BCP-MCNC: 4.2 G/DL (ref 3.4–5)
ALP SERPL-CCNC: 58 U/L (ref 33–110)
ALT SERPL W P-5'-P-CCNC: 10 U/L (ref 7–45)
ANION GAP SERPL CALC-SCNC: 14 MMOL/L (ref 10–20)
AST SERPL W P-5'-P-CCNC: 16 U/L (ref 9–39)
BASOPHILS # BLD AUTO: 0.02 X10*3/UL (ref 0–0.1)
BASOPHILS NFR BLD AUTO: 0.2 %
BILIRUB SERPL-MCNC: 0.4 MG/DL (ref 0–1.2)
BUN SERPL-MCNC: 21 MG/DL (ref 6–23)
CALCIUM SERPL-MCNC: 8.1 MG/DL (ref 8.6–10.6)
CHLORIDE SERPL-SCNC: 99 MMOL/L (ref 98–107)
CO2 SERPL-SCNC: 26 MMOL/L (ref 21–32)
CREAT SERPL-MCNC: 1.17 MG/DL (ref 0.5–1.05)
EGFRCR SERPLBLD CKD-EPI 2021: 58 ML/MIN/1.73M*2
EOSINOPHIL # BLD AUTO: 0.01 X10*3/UL (ref 0–0.7)
EOSINOPHIL NFR BLD AUTO: 0.1 %
ERYTHROCYTE [DISTWIDTH] IN BLOOD BY AUTOMATED COUNT: 12.5 % (ref 11.5–14.5)
GLUCOSE BLD MANUAL STRIP-MCNC: 131 MG/DL (ref 74–99)
GLUCOSE SERPL-MCNC: 74 MG/DL (ref 74–99)
HCT VFR BLD AUTO: 34.7 % (ref 36–46)
HGB BLD-MCNC: 12.3 G/DL (ref 12–16)
IMM GRANULOCYTES # BLD AUTO: 0.03 X10*3/UL (ref 0–0.7)
IMM GRANULOCYTES NFR BLD AUTO: 0.2 % (ref 0–0.9)
LYMPHOCYTES # BLD AUTO: 3.63 X10*3/UL (ref 1.2–4.8)
LYMPHOCYTES NFR BLD AUTO: 29.7 %
MCH RBC QN AUTO: 28.5 PG (ref 26–34)
MCHC RBC AUTO-ENTMCNC: 35.4 G/DL (ref 32–36)
MCV RBC AUTO: 81 FL (ref 80–100)
MONOCYTES # BLD AUTO: 0.64 X10*3/UL (ref 0.1–1)
MONOCYTES NFR BLD AUTO: 5.2 %
NEUTROPHILS # BLD AUTO: 7.91 X10*3/UL (ref 1.2–7.7)
NEUTROPHILS NFR BLD AUTO: 64.6 %
NRBC BLD-RTO: 0 /100 WBCS (ref 0–0)
PLATELET # BLD AUTO: 389 X10*3/UL (ref 150–450)
POTASSIUM SERPL-SCNC: 4.4 MMOL/L (ref 3.5–5.3)
PROT SERPL-MCNC: 7.1 G/DL (ref 6.4–8.2)
RBC # BLD AUTO: 4.31 X10*6/UL (ref 4–5.2)
SODIUM SERPL-SCNC: 135 MMOL/L (ref 136–145)
TSH SERPL-ACNC: 2.68 MIU/L (ref 0.44–3.98)
WBC # BLD AUTO: 12.2 X10*3/UL (ref 4.4–11.3)

## 2024-08-17 PROCEDURE — 96374 THER/PROPH/DIAG INJ IV PUSH: CPT

## 2024-08-17 PROCEDURE — 85025 COMPLETE CBC W/AUTO DIFF WBC: CPT | Performed by: STUDENT IN AN ORGANIZED HEALTH CARE EDUCATION/TRAINING PROGRAM

## 2024-08-17 PROCEDURE — 99284 EMERGENCY DEPT VISIT MOD MDM: CPT | Performed by: STUDENT IN AN ORGANIZED HEALTH CARE EDUCATION/TRAINING PROGRAM

## 2024-08-17 PROCEDURE — 82435 ASSAY OF BLOOD CHLORIDE: CPT | Performed by: STUDENT IN AN ORGANIZED HEALTH CARE EDUCATION/TRAINING PROGRAM

## 2024-08-17 PROCEDURE — RXMED WILLOW AMBULATORY MEDICATION CHARGE

## 2024-08-17 PROCEDURE — 2500000004 HC RX 250 GENERAL PHARMACY W/ HCPCS (ALT 636 FOR OP/ED): Mod: SE

## 2024-08-17 PROCEDURE — 2500000002 HC RX 250 W HCPCS SELF ADMINISTERED DRUGS (ALT 637 FOR MEDICARE OP, ALT 636 FOR OP/ED): Mod: SE

## 2024-08-17 PROCEDURE — 84443 ASSAY THYROID STIM HORMONE: CPT | Performed by: STUDENT IN AN ORGANIZED HEALTH CARE EDUCATION/TRAINING PROGRAM

## 2024-08-17 PROCEDURE — 96361 HYDRATE IV INFUSION ADD-ON: CPT

## 2024-08-17 PROCEDURE — 36415 COLL VENOUS BLD VENIPUNCTURE: CPT | Performed by: EMERGENCY MEDICINE

## 2024-08-17 PROCEDURE — 99284 EMERGENCY DEPT VISIT MOD MDM: CPT | Mod: 25

## 2024-08-17 PROCEDURE — 82374 ASSAY BLOOD CARBON DIOXIDE: CPT | Performed by: STUDENT IN AN ORGANIZED HEALTH CARE EDUCATION/TRAINING PROGRAM

## 2024-08-17 PROCEDURE — 82947 ASSAY GLUCOSE BLOOD QUANT: CPT | Mod: 91

## 2024-08-17 PROCEDURE — 84443 ASSAY THYROID STIM HORMONE: CPT | Performed by: EMERGENCY MEDICINE

## 2024-08-17 PROCEDURE — 2500000001 HC RX 250 WO HCPCS SELF ADMINISTERED DRUGS (ALT 637 FOR MEDICARE OP): Mod: SE

## 2024-08-17 PROCEDURE — 2500000001 HC RX 250 WO HCPCS SELF ADMINISTERED DRUGS (ALT 637 FOR MEDICARE OP): Mod: SE | Performed by: PHYSICIAN ASSISTANT

## 2024-08-17 RX ORDER — HYDROCORTISONE 10 MG/1
10 TABLET ORAL DAILY
Qty: 30 TABLET | Refills: 0 | Status: SHIPPED | OUTPATIENT
Start: 2024-08-17 | End: 2024-09-16

## 2024-08-17 RX ORDER — HYDROCORTISONE 5 MG/1
15 TABLET ORAL ONCE
Status: COMPLETED | OUTPATIENT
Start: 2024-08-17 | End: 2024-08-17

## 2024-08-17 RX ORDER — FLUDROCORTISONE ACETATE 0.1 MG/1
0.1 TABLET ORAL DAILY
Qty: 30 TABLET | Refills: 0 | Status: SHIPPED | OUTPATIENT
Start: 2024-08-17 | End: 2025-08-17

## 2024-08-17 RX ORDER — CALCITRIOL 0.5 UG/1
0.5 CAPSULE ORAL ONCE
Status: COMPLETED | OUTPATIENT
Start: 2024-08-17 | End: 2024-08-17

## 2024-08-17 RX ORDER — FLUDROCORTISONE ACETATE 0.1 MG/1
0.1 TABLET ORAL ONCE
Status: COMPLETED | OUTPATIENT
Start: 2024-08-17 | End: 2024-08-17

## 2024-08-17 RX ORDER — HYDROCORTISONE 10 MG/1
10 TABLET ORAL ONCE
Status: COMPLETED | OUTPATIENT
Start: 2024-08-17 | End: 2024-08-17

## 2024-08-17 RX ORDER — HYDROCORTISONE 5 MG/1
TABLET ORAL
Qty: 120 TABLET | Refills: 0 | Status: SHIPPED | OUTPATIENT
Start: 2024-08-17

## 2024-08-17 RX ADMIN — HYDROCORTISONE 15 MG: 5 TABLET ORAL at 05:53

## 2024-08-17 RX ADMIN — CALCITRIOL 0.5 MCG: 0.5 CAPSULE ORAL at 05:53

## 2024-08-17 RX ADMIN — SODIUM CHLORIDE, POTASSIUM CHLORIDE, SODIUM LACTATE AND CALCIUM CHLORIDE 1000 ML: 600; 310; 30; 20 INJECTION, SOLUTION INTRAVENOUS at 06:27

## 2024-08-17 RX ADMIN — SODIUM CHLORIDE, POTASSIUM CHLORIDE, SODIUM LACTATE AND CALCIUM CHLORIDE 1000 ML: 600; 310; 30; 20 INJECTION, SOLUTION INTRAVENOUS at 04:41

## 2024-08-17 RX ADMIN — FLUDROCORTISONE ACETATE 0.1 MG: 0.1 TABLET ORAL at 05:53

## 2024-08-17 RX ADMIN — HYDROCORTISONE SODIUM SUCCINATE 100 MG: 100 INJECTION, POWDER, FOR SOLUTION INTRAMUSCULAR; INTRAVENOUS at 06:43

## 2024-08-17 RX ADMIN — HYDROCORTISONE 10 MG: 10 TABLET ORAL at 15:22

## 2024-08-17 ASSESSMENT — PAIN SCALES - GENERAL
PAINLEVEL_OUTOF10: 0 - NO PAIN
PAINLEVEL_OUTOF10: 0 - NO PAIN

## 2024-08-17 ASSESSMENT — COLUMBIA-SUICIDE SEVERITY RATING SCALE - C-SSRS
2. HAVE YOU ACTUALLY HAD ANY THOUGHTS OF KILLING YOURSELF?: NO
1. IN THE PAST MONTH, HAVE YOU WISHED YOU WERE DEAD OR WISHED YOU COULD GO TO SLEEP AND NOT WAKE UP?: NO
6. HAVE YOU EVER DONE ANYTHING, STARTED TO DO ANYTHING, OR PREPARED TO DO ANYTHING TO END YOUR LIFE?: NO

## 2024-08-17 ASSESSMENT — LIFESTYLE VARIABLES
HAVE PEOPLE ANNOYED YOU BY CRITICIZING YOUR DRINKING: NO
HAVE YOU EVER FELT YOU SHOULD CUT DOWN ON YOUR DRINKING: NO
EVER HAD A DRINK FIRST THING IN THE MORNING TO STEADY YOUR NERVES TO GET RID OF A HANGOVER: NO
TOTAL SCORE: 0
EVER FELT BAD OR GUILTY ABOUT YOUR DRINKING: NO

## 2024-08-17 ASSESSMENT — PAIN - FUNCTIONAL ASSESSMENT: PAIN_FUNCTIONAL_ASSESSMENT: 0-10

## 2024-08-17 NOTE — DISCHARGE INSTRUCTIONS
Take your medications as prescribed.  Please call [phone] for Primary Care referral for follow up appointments.

## 2024-08-17 NOTE — PROGRESS NOTES
"  [facility] [place] [facility] ED SOCIAL WORK NOTE:   Kriss Malloy is a 48 y.o. female currently in the ED due to needing medications - pt has [place]'s Disease and reports she is out of her needed medication(s).     SW met with patient at bedside. Patient's discharge papers are on the floor next to her bed. Patient's belongings are in a bag on the bedside table. SW introduced herself and asked patient where she has been staying. Patient states [facility] - but states she can't return \"until Monday\".  SW called [facility] to ensure that patient is not able to go there tonight. Per [facility], patient had signed a \"behavioral contract through probate\", wherein patient must display to the staff that she      Anticipated Plan: Patient offered bus pass, but declined. Patient was not interested in discussing other options for shelter, stating, \"you're the , they said you would figure this out\". SW explained that Case Management is available as a walk-in on Monday at several agencies. Patient asked \"how can I leave here, when I don't have a home\". [facility] advised that patient is aware of their contract, and signed that she is aware.  Patient states, \"Kriss Malloy did not assault anyone\", \"Kriss Malloy didn't sign nothing, I'll geo them then, they should have counseling at the shelter\".   SW exited the room, advising if patient would like help with discharge today, to advise. GALEN updated patient's RN, who plans to remove patient's IV so she can get dressed.  If issues arise, PD will assist with patient leaving the ED. Patient is medically ready, medication refills were delivered to bedside. Patient is ambulatory, walking to the restroom without assistance.          ADOD:   ASAP          "
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
## 2024-08-17 NOTE — PROGRESS NOTES
Emergency Medicine Transition of Care Note.    I received Kriss Malloy in signout from Dr. Anthony.  Please see the previous ED provider note for all HPI, PE and MDM up to the time of signout at 0700. This is in addition to the primary record.    In brief Kriss Malloy is an 48 y.o. female presenting for   Chief Complaint   Patient presents with    Lethargy     At the time of signout we were awaiting: [id number] delivery    Pt presents for fatigue, has not taken her home steroids for h/o Melchor's dz in several days. Provided home doses plus IV solucortef prior to signout. VS baseline, SBP typically runs low 90s. Mentating appropriately.     Pt provided with homegoing medications. Please call [phone] for Primary Care referral for follow up appointments.      Diagnostics     Labs Reviewed   COMPREHENSIVE METABOLIC PANEL - Abnormal       Result Value    Glucose 74      Sodium 135 (*)     Potassium 4.4      Chloride 99      Bicarbonate 26      Anion Gap 14      Urea Nitrogen 21      Creatinine 1.17 (*)     eGFR 58 (*)     Calcium 8.1 (*)     Albumin 4.2      Alkaline Phosphatase 58      Total Protein 7.1      AST 16      Bilirubin, Total 0.4      ALT 10     CBC WITH AUTO DIFFERENTIAL - Abnormal    WBC 12.2 (*)     nRBC 0.0      RBC 4.31      Hemoglobin 12.3      Hematocrit 34.7 (*)     MCV 81      MCH 28.5      MCHC 35.4      RDW 12.5      Platelets 389      Neutrophils % 64.6      Immature Granulocytes %, Automated 0.2      Lymphocytes % 29.7      Monocytes % 5.2      Eosinophils % 0.1      Basophils % 0.2      Neutrophils Absolute 7.91 (*)     Immature Granulocytes Absolute, Automated 0.03      Lymphocytes Absolute 3.63      Monocytes Absolute 0.64      Eosinophils Absolute 0.01      Basophils Absolute 0.02     POCT GLUCOSE - Abnormal    POCT Glucose 131 (*)    TSH WITH REFLEX TO FREE T4 IF ABNORMAL - Normal    Thyroid Stimulating Hormone 2.68      Narrative:     TSH testing is performed using different  testing methodology at [facility] than at other [facility] hospitals. Direct result comparisons should only be made within the same method.     BLOOD GAS VENOUS FULL PANEL       No orders to display         ED Course as of 08/17/24 1027   Sat Aug 17, 2024   0543 48-year-old female, lying in bed, no acute distress.  She has a history of [place]'s disease on steroids, hypothyroidism, schizophrenia, she has been out of her steroid medication for Melchor's disease for several days, she is presenting to the emergency department for not feeling well, describes it as fatigue.  On presentation she is hypotensive but is perfusing appropriately with good capillary refill, she is mentating appropriately, she is following commands, heart rate is regular rate and rhythm, lungs are clear to auscultation bilaterally, her belly is soft and nontender, no evidence of trauma.  She was ordered for her home steroid medication, she was ordered for some fluids.  She will be signed out pending a reevaluation. [NS]      ED Course User Index  [NS] Eduardo Hawkins MD         Diagnoses as of 08/17/24 1027   Melchor's disease (Multi)         Final diagnoses:   [E27.1] Melchor's disease (Multi)         Isabel Bradley PA-C

## 2024-08-17 NOTE — ED TRIAGE NOTES
"Enters ED stating \"I just don't feel good.\" Pt observed to be internally stimulated, fidgeting, and talking to herself. Baseline history of schizophrenia, schizoaffective disorder, tobacco use, insomnia, Polyglandular autoimmune deficiency (hypothyroidism, Twiggs's disease, hypocalcemia), and Schachenmann's syndrome.  "

## 2024-08-19 LAB
ANION GAP BLDV CALCULATED.4IONS-SCNC: 11 MMOL/L (ref 10–25)
BASE EXCESS BLDV CALC-SCNC: 1.7 MMOL/L (ref -2–3)
BODY TEMPERATURE: 37 DEGREES CELSIUS
CA-I BLDV-SCNC: 0.96 MMOL/L (ref 1.1–1.33)
CHLORIDE BLDV-SCNC: 102 MMOL/L (ref 98–107)
GLUCOSE BLDV-MCNC: 85 MG/DL (ref 74–99)
HCO3 BLDV-SCNC: 26.6 MMOL/L (ref 22–26)
HCT VFR BLD EST: 38 % (ref 36–46)
HGB BLDV-MCNC: 12.7 G/DL (ref 12–16)
INHALED O2 CONCENTRATION: 21 %
LACTATE BLDV-SCNC: 0.8 MMOL/L (ref 0.4–2)
OXYHGB MFR BLDV: 71.2 % (ref 45–75)
PCO2 BLDV: 42 MM HG (ref 41–51)
PH BLDV: 7.41 PH (ref 7.33–7.43)
PO2 BLDV: 44 MM HG (ref 35–45)
POTASSIUM BLDV-SCNC: 5.2 MMOL/L (ref 3.5–5.3)
SAO2 % BLDV: 72 % (ref 45–75)
SODIUM BLDV-SCNC: 134 MMOL/L (ref 136–145)

## 2024-09-12 ENCOUNTER — HOSPITAL ENCOUNTER (EMERGENCY)
Facility: HOSPITAL | Age: 49
Discharge: HOME | End: 2024-09-12
Attending: EMERGENCY MEDICINE
Payer: COMMERCIAL

## 2024-09-12 ENCOUNTER — PHARMACY VISIT (OUTPATIENT)
Dept: PHARMACY | Facility: CLINIC | Age: 49
End: 2024-09-12
Payer: MEDICAID

## 2024-09-12 VITALS
RESPIRATION RATE: 16 BRPM | HEART RATE: 72 BPM | BODY MASS INDEX: 17.93 KG/M2 | OXYGEN SATURATION: 99 % | HEIGHT: 64 IN | DIASTOLIC BLOOD PRESSURE: 56 MMHG | SYSTOLIC BLOOD PRESSURE: 92 MMHG | WEIGHT: 105 LBS | TEMPERATURE: 98.2 F

## 2024-09-12 DIAGNOSIS — E27.1 ADDISON'S DISEASE (MULTI): ICD-10-CM

## 2024-09-12 PROCEDURE — RXMED WILLOW AMBULATORY MEDICATION CHARGE

## 2024-09-12 PROCEDURE — 99283 EMERGENCY DEPT VISIT LOW MDM: CPT

## 2024-09-12 PROCEDURE — 99284 EMERGENCY DEPT VISIT MOD MDM: CPT | Performed by: EMERGENCY MEDICINE

## 2024-09-12 RX ORDER — HYDROCORTISONE 10 MG/1
10 TABLET ORAL DAILY
Status: DISCONTINUED | OUTPATIENT
Start: 2024-09-12 | End: 2024-09-12 | Stop reason: HOSPADM

## 2024-09-12 RX ORDER — FLUDROCORTISONE ACETATE 0.1 MG/1
0.1 TABLET ORAL DAILY
Status: DISCONTINUED | OUTPATIENT
Start: 2024-09-12 | End: 2024-09-12 | Stop reason: HOSPADM

## 2024-09-12 RX ORDER — HYDROCORTISONE 5 MG/1
5 TABLET ORAL DAILY
Status: DISCONTINUED | OUTPATIENT
Start: 2024-09-12 | End: 2024-09-12

## 2024-09-12 RX ORDER — HYDROCORTISONE 5 MG/1
TABLET ORAL
Qty: 120 TABLET | Refills: 0 | Status: SHIPPED | OUTPATIENT
Start: 2024-09-12

## 2024-09-12 RX ORDER — FLUDROCORTISONE ACETATE 0.1 MG/1
0.1 TABLET ORAL DAILY
Qty: 30 TABLET | Refills: 0 | Status: SHIPPED | OUTPATIENT
Start: 2024-09-12 | End: 2024-10-12

## 2024-09-12 ASSESSMENT — COLUMBIA-SUICIDE SEVERITY RATING SCALE - C-SSRS
6. HAVE YOU EVER DONE ANYTHING, STARTED TO DO ANYTHING, OR PREPARED TO DO ANYTHING TO END YOUR LIFE?: NO
2. HAVE YOU ACTUALLY HAD ANY THOUGHTS OF KILLING YOURSELF?: NO
1. IN THE PAST MONTH, HAVE YOU WISHED YOU WERE DEAD OR WISHED YOU COULD GO TO SLEEP AND NOT WAKE UP?: NO

## 2024-09-12 ASSESSMENT — PAIN SCALES - GENERAL: PAINLEVEL_OUTOF10: 0 - NO PAIN

## 2024-09-12 ASSESSMENT — PAIN - FUNCTIONAL ASSESSMENT: PAIN_FUNCTIONAL_ASSESSMENT: 0-10

## 2024-09-12 NOTE — ED TRIAGE NOTES
Presents via [facility] with c/o general not feeling well. H/o addisons, states she feels like she is having a flair. Seen at [facility] today, given mag and DC but pt states she still feel unwell

## 2024-09-12 NOTE — ED PROVIDER NOTES
Emergency Department Provider Note        History of Present Illness     History provided by: Patient  Limitations to History: None    HPI:  Patient is a 49-year-old female with a history of [place]'s disease is presenting to the ED for weakness.  Patient was recently seen today at the [facility] and at [facility] 2 days prior for similar symptoms.  Patient states she is not compliant with her medication.  Unaware of what the patient got at the previous hospitalizations however documentation shows that the patient was discharged with steroids for her [place]'s disease.  EMS brought the patient in and states that she was discharged in the [facility] and then called EMS again as she states she still weak.  Patient is denying any chest pain, abdominal pain, dizziness or lightheadedness.  Patient is denying any vaginal bleeding, nausea vomiting diarrhea.  Patient states she is here because of her Melchor's disease.  Physical Exam   Triage vitals:  T 36.1 °C (96.9 °F)  HR 82  /77  RR 16  O2 96 % None (Room air)    Physical Exam  Constitutional:       Appearance: Normal appearance.   HENT:      Head: Normocephalic.   Eyes:      Extraocular Movements: Extraocular movements intact.      Pupils: Pupils are equal, round, and reactive to light.   Cardiovascular:      Rate and Rhythm: Normal rate.   Pulmonary:      Effort: Pulmonary effort is normal.      Breath sounds: Normal breath sounds.   Abdominal:      General: Abdomen is flat.   Musculoskeletal:         General: Normal range of motion.      Cervical back: Normal range of motion.   Skin:     General: Skin is warm.   Neurological:      Mental Status: She is alert and oriented to person, place, and time. Mental status is at baseline.      Motor: No weakness.      Coordination: Coordination normal.      Gait: Gait normal.   Psychiatric:         Mood and Affect: Mood normal.         Behavior: Behavior normal.          Medical Decision Making & ED  Course   Medical Decision Making:  Patient is a 49-year-old female with a history of [place]'s disease presenting the ED for weakness.  Patient was recently seen at the [facility] today and [facility] 2 days ago for similar complaints.  Patient review of systems is negative she denied any chest pain, abdominal pain, nausea vomiting diarrhea, headache, dizziness, lightheadedness.  Patient denied any vaginal bleeding or urinary symptoms.  Patient neuroexam was nonconcerning.  Patient was able to ambulate.  Patient refused all labs while in the ED refused fluids.  Patient was ordered hydrocortisone, fludrocortisone for which the patient refused to take.  Patient pressure 92/56 which she states is typically her baseline.  Patient refused labs and stated that she wanted to leave.  Patient therefore was discharged and was given meds to beds for her Florinef and Cortef as well as referral to [facility].  ----         EKG Independent Interpretation: EKG not obtained        The patient was discussed with the following consultants/services: None           Disposition   As a result of the work-up, the patient was discharged home.  she was informed of her diagnosis and instructed to come back with any concerns or worsening of condition.  she and was agreeable to the plan as discussed above.  she was given the opportunity to ask questions.  All of the patient's questions were answered.    Procedures   Procedures    Patient seen and discussed with ED attending physician.    Emi Chaney MD  Emergency Medicine       Emi Chaney MD  Resident  09/12/24 [id number]

## 2024-09-12 NOTE — ED NOTES
Patient refusing all cares. Leaves provided meds and DC papers at bedside upon leaving the ED     Shazia Camarillo RN  09/12/24 [id number]

## 2024-09-22 ENCOUNTER — HOSPITAL ENCOUNTER (EMERGENCY)
Facility: HOSPITAL | Age: 49
Discharge: LEFT BEFORE DISCHARGE | End: 2024-09-22
Payer: COMMERCIAL

## 2024-09-22 ENCOUNTER — CLINICAL SUPPORT (OUTPATIENT)
Dept: EMERGENCY MEDICINE | Facility: HOSPITAL | Age: 49
End: 2024-09-22
Payer: COMMERCIAL

## 2024-09-22 ENCOUNTER — APPOINTMENT (OUTPATIENT)
Dept: RADIOLOGY | Facility: HOSPITAL | Age: 49
End: 2024-09-22
Payer: COMMERCIAL

## 2024-09-22 ENCOUNTER — HOSPITAL ENCOUNTER (EMERGENCY)
Facility: HOSPITAL | Age: 49
Discharge: HOME | End: 2024-09-22
Attending: EMERGENCY MEDICINE
Payer: COMMERCIAL

## 2024-09-22 VITALS
DIASTOLIC BLOOD PRESSURE: 64 MMHG | SYSTOLIC BLOOD PRESSURE: 98 MMHG | TEMPERATURE: 98.5 F | RESPIRATION RATE: 16 BRPM | HEART RATE: 70 BPM | WEIGHT: 110 LBS | HEIGHT: 66 IN | OXYGEN SATURATION: 99 % | BODY MASS INDEX: 17.68 KG/M2

## 2024-09-22 DIAGNOSIS — Z86.39 HX OF ADDISON'S DISEASE: ICD-10-CM

## 2024-09-22 DIAGNOSIS — E16.2 HYPOGLYCEMIA: Primary | ICD-10-CM

## 2024-09-22 LAB
ALBUMIN SERPL BCP-MCNC: 4.2 G/DL (ref 3.4–5)
ALP SERPL-CCNC: 67 U/L (ref 33–110)
ALT SERPL W P-5'-P-CCNC: 10 U/L (ref 7–45)
ANION GAP SERPL CALC-SCNC: 21 MMOL/L (ref 10–20)
AST SERPL W P-5'-P-CCNC: 21 U/L (ref 9–39)
ATRIAL RATE: 88 BPM
BASOPHILS # BLD AUTO: 0.02 X10*3/UL (ref 0–0.1)
BASOPHILS NFR BLD AUTO: 0.2 %
BILIRUB SERPL-MCNC: 0.8 MG/DL (ref 0–1.2)
BNP SERPL-MCNC: 4 PG/ML (ref 0–99)
BUN SERPL-MCNC: 28 MG/DL (ref 6–23)
CALCIUM SERPL-MCNC: 9 MG/DL (ref 8.6–10.6)
CARDIAC TROPONIN I PNL SERPL HS: 6 NG/L (ref 0–34)
CHLORIDE SERPL-SCNC: 98 MMOL/L (ref 98–107)
CO2 SERPL-SCNC: 19 MMOL/L (ref 21–32)
CREAT SERPL-MCNC: 1 MG/DL (ref 0.5–1.05)
EGFRCR SERPLBLD CKD-EPI 2021: 70 ML/MIN/1.73M*2
EOSINOPHIL # BLD AUTO: 0.01 X10*3/UL (ref 0–0.7)
EOSINOPHIL NFR BLD AUTO: 0.1 %
ERYTHROCYTE [DISTWIDTH] IN BLOOD BY AUTOMATED COUNT: 12.5 % (ref 11.5–14.5)
GLUCOSE BLD MANUAL STRIP-MCNC: 149 MG/DL (ref 74–99)
GLUCOSE SERPL-MCNC: 44 MG/DL (ref 74–99)
HCT VFR BLD AUTO: 41.6 % (ref 36–46)
HGB BLD-MCNC: 13.5 G/DL (ref 12–16)
HOLD SPECIMEN: NORMAL
IMM GRANULOCYTES # BLD AUTO: 0.04 X10*3/UL (ref 0–0.7)
IMM GRANULOCYTES NFR BLD AUTO: 0.4 % (ref 0–0.9)
LYMPHOCYTES # BLD AUTO: 2.81 X10*3/UL (ref 1.2–4.8)
LYMPHOCYTES NFR BLD AUTO: 28.2 %
MAGNESIUM SERPL-MCNC: 1.55 MG/DL (ref 1.6–2.4)
MCH RBC QN AUTO: 28.7 PG (ref 26–34)
MCHC RBC AUTO-ENTMCNC: 32.5 G/DL (ref 32–36)
MCV RBC AUTO: 88 FL (ref 80–100)
MONOCYTES # BLD AUTO: 0.8 X10*3/UL (ref 0.1–1)
MONOCYTES NFR BLD AUTO: 8 %
NEUTROPHILS # BLD AUTO: 6.29 X10*3/UL (ref 1.2–7.7)
NEUTROPHILS NFR BLD AUTO: 63.1 %
NRBC BLD-RTO: 0 /100 WBCS (ref 0–0)
P AXIS: 61 DEGREES
P OFFSET: 198 MS
P ONSET: 158 MS
PHOSPHATE SERPL-MCNC: 5.6 MG/DL (ref 2.5–4.9)
PLATELET # BLD AUTO: 365 X10*3/UL (ref 150–450)
POTASSIUM SERPL-SCNC: 4.3 MMOL/L (ref 3.5–5.3)
PR INTERVAL: 126 MS
PROT SERPL-MCNC: 6.9 G/DL (ref 6.4–8.2)
Q ONSET: 221 MS
QRS COUNT: 14 BEATS
QRS DURATION: 80 MS
QT INTERVAL: 434 MS
QTC CALCULATION(BAZETT): 525 MS
QTC FREDERICIA: 493 MS
R AXIS: 75 DEGREES
RBC # BLD AUTO: 4.71 X10*6/UL (ref 4–5.2)
SODIUM SERPL-SCNC: 134 MMOL/L (ref 136–145)
T AXIS: -32 DEGREES
T OFFSET: 438 MS
VENTRICULAR RATE: 88 BPM
WBC # BLD AUTO: 10 X10*3/UL (ref 4.4–11.3)

## 2024-09-22 PROCEDURE — 71045 X-RAY EXAM CHEST 1 VIEW: CPT | Mod: FOREIGN READ | Performed by: RADIOLOGY

## 2024-09-22 PROCEDURE — 80053 COMPREHEN METABOLIC PANEL: CPT

## 2024-09-22 PROCEDURE — 83880 ASSAY OF NATRIURETIC PEPTIDE: CPT

## 2024-09-22 PROCEDURE — 85025 COMPLETE CBC W/AUTO DIFF WBC: CPT

## 2024-09-22 PROCEDURE — 99284 EMERGENCY DEPT VISIT MOD MDM: CPT | Mod: 25

## 2024-09-22 PROCEDURE — 36415 COLL VENOUS BLD VENIPUNCTURE: CPT

## 2024-09-22 PROCEDURE — 2500000004 HC RX 250 GENERAL PHARMACY W/ HCPCS (ALT 636 FOR OP/ED): Mod: SE

## 2024-09-22 PROCEDURE — 84100 ASSAY OF PHOSPHORUS: CPT | Performed by: EMERGENCY MEDICINE

## 2024-09-22 PROCEDURE — 4500999001 HC ED NO CHARGE

## 2024-09-22 PROCEDURE — 82947 ASSAY GLUCOSE BLOOD QUANT: CPT

## 2024-09-22 PROCEDURE — 83735 ASSAY OF MAGNESIUM: CPT | Performed by: EMERGENCY MEDICINE

## 2024-09-22 PROCEDURE — 96366 THER/PROPH/DIAG IV INF ADDON: CPT

## 2024-09-22 PROCEDURE — 96365 THER/PROPH/DIAG IV INF INIT: CPT

## 2024-09-22 PROCEDURE — 93005 ELECTROCARDIOGRAM TRACING: CPT

## 2024-09-22 PROCEDURE — 84484 ASSAY OF TROPONIN QUANT: CPT | Performed by: EMERGENCY MEDICINE

## 2024-09-22 PROCEDURE — 96361 HYDRATE IV INFUSION ADD-ON: CPT

## 2024-09-22 PROCEDURE — 71045 X-RAY EXAM CHEST 1 VIEW: CPT

## 2024-09-22 RX ORDER — MAGNESIUM SULFATE HEPTAHYDRATE 40 MG/ML
2 INJECTION, SOLUTION INTRAVENOUS ONCE
Status: COMPLETED | OUTPATIENT
Start: 2024-09-22 | End: 2024-09-22

## 2024-09-22 SDOH — HEALTH STABILITY: MENTAL HEALTH: BEHAVIORAL HEALTH(WDL): EXCEPTIONS TO WDL

## 2024-09-22 SDOH — HEALTH STABILITY: MENTAL HEALTH: BEHAVIORS/MOOD: COOPERATIVE;IRRITABLE

## 2024-09-22 ASSESSMENT — PAIN SCALES - GENERAL
PAINLEVEL_OUTOF10: 0 - NO PAIN

## 2024-09-22 ASSESSMENT — LIFESTYLE VARIABLES
HAVE YOU EVER FELT YOU SHOULD CUT DOWN ON YOUR DRINKING: NO
HAVE PEOPLE ANNOYED YOU BY CRITICIZING YOUR DRINKING: NO
TOTAL SCORE: 0
EVER HAD A DRINK FIRST THING IN THE MORNING TO STEADY YOUR NERVES TO GET RID OF A HANGOVER: NO
EVER FELT BAD OR GUILTY ABOUT YOUR DRINKING: NO

## 2024-09-22 ASSESSMENT — PAIN - FUNCTIONAL ASSESSMENT: PAIN_FUNCTIONAL_ASSESSMENT: 0-10
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
## 2024-09-23 ENCOUNTER — APPOINTMENT (OUTPATIENT)
Dept: RADIOLOGY | Facility: HOSPITAL | Age: 49
End: 2024-09-23
Payer: COMMERCIAL

## 2024-09-23 ENCOUNTER — HOSPITAL ENCOUNTER (EMERGENCY)
Facility: HOSPITAL | Age: 49
Discharge: LEFT BEFORE DISCHARGE | End: 2024-09-23
Payer: COMMERCIAL

## 2024-09-23 ENCOUNTER — HOSPITAL ENCOUNTER (EMERGENCY)
Facility: HOSPITAL | Age: 49
Discharge: LEFT BEFORE DISCHARGE | End: 2024-09-23
Attending: STUDENT IN AN ORGANIZED HEALTH CARE EDUCATION/TRAINING PROGRAM
Payer: COMMERCIAL

## 2024-09-23 VITALS
HEIGHT: 66 IN | RESPIRATION RATE: 16 BRPM | TEMPERATURE: 97.9 F | SYSTOLIC BLOOD PRESSURE: 86 MMHG | OXYGEN SATURATION: 98 % | DIASTOLIC BLOOD PRESSURE: 57 MMHG | HEART RATE: 65 BPM | WEIGHT: 110 LBS | BODY MASS INDEX: 17.68 KG/M2

## 2024-09-23 VITALS
BODY MASS INDEX: 17.68 KG/M2 | TEMPERATURE: 98.1 F | HEART RATE: 84 BPM | DIASTOLIC BLOOD PRESSURE: 53 MMHG | RESPIRATION RATE: 19 BRPM | WEIGHT: 110 LBS | SYSTOLIC BLOOD PRESSURE: 81 MMHG | OXYGEN SATURATION: 97 % | HEIGHT: 66 IN

## 2024-09-23 DIAGNOSIS — F69 BEHAVIOR PROBLEM, ADULT: Primary | ICD-10-CM

## 2024-09-23 DIAGNOSIS — R55 SYNCOPE, UNSPECIFIED SYNCOPE TYPE: Primary | ICD-10-CM

## 2024-09-23 LAB — GLUCOSE BLD MANUAL STRIP-MCNC: 121 MG/DL (ref 74–99)

## 2024-09-23 PROCEDURE — 82947 ASSAY GLUCOSE BLOOD QUANT: CPT

## 2024-09-23 PROCEDURE — 99283 EMERGENCY DEPT VISIT LOW MDM: CPT

## 2024-09-23 PROCEDURE — 99284 EMERGENCY DEPT VISIT MOD MDM: CPT

## 2024-09-23 PROCEDURE — 99283 EMERGENCY DEPT VISIT LOW MDM: CPT | Performed by: STUDENT IN AN ORGANIZED HEALTH CARE EDUCATION/TRAINING PROGRAM

## 2024-09-23 ASSESSMENT — PAIN - FUNCTIONAL ASSESSMENT: PAIN_FUNCTIONAL_ASSESSMENT: 0-10

## 2024-09-23 ASSESSMENT — PAIN SCALES - GENERAL: PAINLEVEL_OUTOF10: 0 - NO PAIN
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
## 2024-09-23 NOTE — ED PROVIDER NOTES
Emergency Department Provider Note        History of Present Illness     History provided by: Patient  Limitations to History: None  External Records Reviewed with Brief Summary: Care Everywhere visit at [facility] on 9/12/2024 which showed visit for generalized weakness, after which patient was discharged with a prescription for steroids for her [place]'s disease    HPI:  Kriss Malloy is a 49 y.o. female with PMHx [place]'s disease who presents to ED via EMS after she laid down on the sidewalk outside  stating she was unable to walk and felt like she was about to die.  Patient was unable to elaborate further.  She denies acute weakness in her legs or loss of balance.  She does state that she is hungry and thinks she has not eaten in a few days.  She reports some nausea, however no vomiting.    Reports no headache, lightheadedness/dizziness, chest pain, abdominal pain, diarrhea/constipation.  While patient did report shortness of breath to triage RN, she denies shortness of breath during my interview.    Physical Exam   Triage vitals:  T 36.9 °C (98.5 °F)  HR 83  BP 94/58  RR 14  O2 99 % None (Room air)    Physical exam:   Triage vitals reviewed.  Constitutional: Well developed adult in no acute distress, non toxic in appearance  Head: Normocephalic, atraumatic  Skin: Intact, dry. No rashes or lesions.  Eyes: Pupils are equal. No conjunctival injection.  Pulmonary: Normal work of breathing with no accessory muscle use noted.  Clear to auscultation bilaterally.   Cardiovascular: Normal rate, regular rhythm. No murmurs/gallops/rubs appreciated.   Abdomen: Soft, nondistended. Nontender to palpation.  Extremities: No gross deformities.  Moving all extremities spontaneously without difficulty. Ambulated appropriately to bathroom.  Neuro: Awake and alert, oriented x4. Face is symmetric. Speech is clear. No obvious focal findings.  Strength 5/5 in bilateral upper lower extremities, sensation intact to light  touch throughout.  Psych: Appropriate mood and affect.      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  On arrival, she was afebrile, HR 83, BP 94/58, and saturating 99% on room air.  On exam, she has no focal neurologic deficits, has intact strength and sensation throughout.  She was started on 1 L bolus LR and provided with a sandwich to eat.    Initial laboratory evaluation concerning for glucose of 44.  I reevaluated the patient, who was eating meghna crackers and drinking ginger ale, and who appeared awake and alert, and was conversant.  Repeat blood sugar 149.  CMP was also significant for bicarb of 19.  Magnesium 1.55, so patient was given 2 g of IV mag sulfate.  EKG without overt signs of ischemia, however did have T wave inversions.  Initial troponin within normal limits.  Recommended VBG and delta troponin, however patient declined additional blood work at that time despite discussion of the utility for ruling out NSTEMI.    CXR showed no evidence of pneumonia, pneumothorax, or widened mediastinum.    Patient provided with additional sandwiches and drinks.  Tolerated PO without difficulty and was noted to be ambulating through the department with a steady gait.    Given negative workup, discussed with the patient that we believe she is safe for discharge.  Patient disagreed, however was unable to articulate why she should stay in the emergency department or hospital.  Reviewed strict return precautions including worsening symptoms or new concerns.  Given her history of Melchor's disease, I did provide a referral to endocrinology, as it appears it has been sometime since the patient followed up.  I did recommend that she follow-up with endocrinology and with her primary care provider.    When nursing staff attempted to give patient her discharge paperwork she  became verbally aggressive, and was ultimately escorted out of the department by  [facility].  ----    Differential diagnoses considered include but are not limited to: Hypoglycemia, ACS, pneumonia, electrolyte derangement     Social Determinants of Health which Significantly Impact Care: Food insecurity The following actions were taken to address these social determinants: Food provided in ED    EKG Independent Interpretation: EKG interpreted by myself. Please see ED Course for full interpretation.    Independent Result Review and Interpretation: Relevant laboratory and radiographic results were reviewed and independently interpreted by myself.  As necessary, they are commented on in the ED Course.    Chronic conditions affecting the patient's care: As documented above in MDM    The patient was discussed with the following consultants/services: None    Care Considerations: As documented above in [facility]    ED Course:  ED Course as of 09/23/24 0126   Sun Sep 22, 2024   1603 ECG 12 lead  EKG interpreted by me (ED resident): 88 bpm, normal sinus rhythm with a normal axis.  , QTc 525, QRS 80.  No ST elevations or depressions concerning for ischemia.  Patient does have T wave versions in leads II, 3, aVF, and in lateral leads V4-V6.  On comparison with prior EKG from 3/8/2024, T wave inversions in inferior and lateral leads are now present. [HH]   2032 Patient refused blood work for delta troponin and VBG [HH]   2036 POCT Glucose(!): 149  POCT blood glucose improved from 44 to 149 after food [HH]      ED Course User Index  [HH] Dania Grayson MD         Diagnoses as of 09/23/24 0126   Hypoglycemia   Hx of Durham's disease     Disposition   As a result of the work-up, the patient was discharged home.  she was informed of her diagnosis and instructed to come back with any concerns or worsening of condition.  she and was agreeable to the plan as discussed above.  she was given the opportunity to ask questions.  All of the  patient's questions were answered.    Patient seen and discussed with ED attending physician.    Dania Grayson MD  Emergency Medicine     Dania Grayson MD  Resident  09/23/24 0126       Dania Grayson MD  Resident  09/23/24 0126

## 2024-09-23 NOTE — ED PROVIDER NOTES
"HPI   Chief Complaint   Patient presents with    Illness       49-year-old female with history of [place]'s disease (on hydrocortisone but unsure of compliance) as well as schizophrenia presents for chief complaint of \"feel like I am going to die.\"  I went into the room to attempt to see the patient.  She is frustrated with the room that she is currently residing, and is asking for a \"real bed\" instead of an \"OB/GYN bed\".  She seemed to fixate on this, and would not answer many questions.  She would only repeat that she \"feels like she is going to die\".  When asked to elaborate she would say to \"read the notes from yesterday\".  She eventually said that she wanted to talk to someone else.  Again she would not answer any further questions besides these and generally seemed frustrated/agitated during the H&P/exam.        Patient interviewed by my Attending. Patient was sleeping comfortably in bed and was arousable, she reports \"I laid down in the grass because I thought I was going to die\". Reported she had all over body pain. Indicates now the pain has fully resolved. Denies CP, SOB, abdominal pain, ambivalent regarding whether or not she is taking her meds. Reports she just wants to rest. Denies SI/HI/AH/VH currently. Patient initially indicates that she is comfortable with repeat work-up and expanding work-up to CT head and CTA chest to assess for alternative causes of her pain with yesterday's workup generally WNL (though did not allow for repeat trop or VBG). HypoMg was acted on. Low glucose was acted on.    Patient History   History reviewed. No pertinent past medical history.  History reviewed. No pertinent surgical history.  No family history on file.  Social History     Tobacco Use    Smoking status: Unknown    Smokeless tobacco: Not on file   Substance Use Topics    Alcohol use: Not on file    Drug use: Not on file       Physical Exam   ED Triage Vitals [09/23/24 0536]   Temperature Heart Rate Respirations BP " "  36.7 °C (98.1 °F) 84 19 81/53      Pulse Ox Temp Source Heart Rate Source Patient Position   97 % Temporal Monitor Sitting      BP Location FiO2 (%)     Left arm --       Physical Exam  Vitals and nursing note reviewed.   Constitutional:       General: She is not in acute distress.     Appearance: She is well-developed.   HENT:      Head: Normocephalic and atraumatic.   Eyes:      Conjunctiva/sclera: Conjunctivae normal.   Cardiovascular:      Rate and Rhythm: Normal rate and regular rhythm.      Heart sounds: No murmur heard.  Pulmonary:      Effort: Pulmonary effort is normal. No respiratory distress.      Breath sounds: Normal breath sounds.   Abdominal:      Palpations: Abdomen is soft.      Tenderness: There is no abdominal tenderness.   Musculoskeletal:         General: No swelling.      Cervical back: Neck supple.   Skin:     General: Skin is warm and dry.      Capillary Refill: Capillary refill takes less than 2 seconds.   Neurological:      Mental Status: She is alert.   Psychiatric:         Mood and Affect: Mood normal.           ED Course & [facility]   ED Course as of 09/23/24 0832   Mon Sep 23, 2024   0737 Patient reports to me that she laid down in the grass today as she was \"worried she was going to die\" and had pain all over. Not able to state if she is taking her meds, I awoke patient from sleep, easily arousable, reports that the body pain has improved and would like to sleep more. Did have chest pain yesterday with troponin []   0816 Patient refusing IV placement, cursing and angry, will not even let me place an IV despite patient having excellent vasculature and would be an easy placement. Frankly, with negative work-up yesterday and patient indicating symptoms have improved, patient observed here for 3 hours, and will sign out AMA []      ED Course User Index  [] Duglas Tabor MD         Diagnoses as of 09/23/24 0832   Behavior problem, adult                 No data recorded     Pearl Coma " "Scale Score: 15 (09/23/24 0535 : Fahad Bradford, RN)                           Medical Decision Making  49-year-old female with history of Melchor's disease (on hydrocortisone but unsure of compliance) as well as schizophrenia presents for chief complaint of \"I feel like I am going to die\".  She will not elaborate on this and she is generally noncompliant with history and physical as well as exam initially.  She seems fixated on her current bed status and is asking for a new one.  I did attempt to find a new bed but was unsuccessful.  I asked the charge nurse but she also agrees that there are no other beds available.  The patient did escalate and began yelling/shouting at us.  She then asked to \"talk to someone else\".  Was able to staff this patient with my attending.  While waiting for attending, the patient did walk out of the room in an attempt to find her own bed around the ED.  Advised that she needed to go back into her room and that she was violating patient's privacy and safety by wandering around the ED.  She went into 2 different pod looking for bed.  After she refused to comply with directions, police were called.  By the time they got here she had brought herself back to her room.      Patient did eventually say that she had chest discomfort, so we decided to expand the workup from yesterday.  She denied any suicidal or homicidal ideations.  A full workup was ordered.  This included labs as well as CT angio chest for PE.  However patient would still not comply and instead appeared to want to sleep for most of the time while she was here.  She refused IV.  We did explain to her multiple times the importance of getting an IV so that we can do the scans correctly and also blood work.  Patient declined.  Stated that she might comply with a butterfly needle for blood work.  We advised her that she needed IV for the CT scan.  We attempted with 3 different providers to start the IV but each time the patient " "refused to let them even start.  Again we explained to her that we could not find out what was going on with her if she did not try to comply with the workup.  At this point she would still not comply and still was only appearing to attempt to sleep.  She was easily arousable and still alert and oriented.  Decision was made to discharge the patient AGAINST MEDICAL ADVICE after numerous attempts were made over multiple hours to attempt to work her up.  Explained the risks of leaving AGAINST MEDICAL ADVICE without this workup, including potentially having a PE, aortic dissection, MI etc.,  And that she risked death, disability, and/or severe pain, among other things.  She also was at risk for hypotension without receiving her hydrocortisone.  States that she does not want anything and is okay with this and understands.  Discharged AGAINST MEDICAL ADVICE.    49-year-old female with history of Melchor's disease (on hydrocortisone but unsure of compliance) as well as schizophrenia presents for chief complaint of \"feel like I am going to die\".    Procedure  Procedures     Ko Thompson, SUZI-CNP  09/23/24 1842       Duglas Tabor MD  09/23/24 0849    "

## 2024-09-23 NOTE — DISCHARGE INSTRUCTIONS
"You were just seen here yesterday, your sugars are normal, you have hydrocortisone written for, 4 different people, including your nurse, paramedic, and Attending physician all attempted to place an IV and you refused. You just had a negative work-up yesterday, the symptoms you complained of, \"full body pain and feeling you might die\" have all resolved. We had ordered an extensive additional work-up today, but with you repeatedly refusing labs, you have had a normal medical screening exam, your blood pressure is at your baseline, we observed you here for 3 hours, and we are signing you out against medical advice.  "

## 2024-09-23 NOTE — ED PROVIDER NOTES
"HPI   Chief Complaint   Patient presents with    Illness       49-year-old female with history of [place]'s disease (on hydrocortisone but unsure of compliance), as well as schizophrenia, presents for chief complaint of syncope.  She was seen multiple times in the last 24 hours.  Previously seen by me earlier today.  She left AGAINST MEDICAL ADVICE at that point, as she would not comply with any workup after numerous attempts.  Today she states that she went to the bus stop and \"passed out\".  Someone apparently saw her laying on the ground and they called 911, who brought her here.  Denies any new complaints besides syncope.  States that she did not hit her head or lose consciousness and denies headache.  Denies any incontinence or seizure activity.  Denies chest pain at this time or dyspnea.  Denies nausea or vomiting.              Patient History   History reviewed. No pertinent past medical history.  History reviewed. No pertinent surgical history.  No family history on file.  Social History     Tobacco Use    Smoking status: Unknown    Smokeless tobacco: Not on file   Substance Use Topics    Alcohol use: Not on file    Drug use: Not on file       Physical Exam   ED Triage Vitals [09/23/24 1346]   Temperature Heart Rate Respirations BP   36.6 °C (97.9 °F) 65 16 86/57      Pulse Ox Temp Source Heart Rate Source Patient Position   98 % Temporal Monitor Sitting      BP Location FiO2 (%)     Left arm --       Physical Exam  Vitals and nursing note reviewed.   Constitutional:       General: She is not in acute distress.     Appearance: She is well-developed.   HENT:      Head: Normocephalic and atraumatic.   Eyes:      Conjunctiva/sclera: Conjunctivae normal.   Cardiovascular:      Rate and Rhythm: Normal rate and regular rhythm.      Heart sounds: No murmur heard.  Pulmonary:      Effort: Pulmonary effort is normal. No respiratory distress.      Breath sounds: Normal breath sounds.   Abdominal:      Palpations: " Abdomen is soft.      Tenderness: There is no abdominal tenderness.   Musculoskeletal:         General: No swelling.      Cervical back: Neck supple.   Skin:     General: Skin is warm and dry.      Capillary Refill: Capillary refill takes less than 2 seconds.   Neurological:      Mental Status: She is alert.   Psychiatric:         Mood and Affect: Mood normal.           ED Course & MDM   Diagnoses as of 09/23/24 1639   Syncope, unspecified syncope type                 No data recorded     Nate Coma Scale Score: 15 (09/23/24 1348 : Fahad Bradford RN)                           Medical Decision Making  Vital signs reviewed, significant for hypotension at 86/57.  Likely that she has been noncompliant with her hydrocortisone for her Melchor's.  I ordered additional meds, as the ones ordered previously during her last check-in here this morning were refused by the patient.  Diagnostic testing was ordered.  I was hoping to rule out cardiac cause as well as get a head CT and a chest CT with angio to rule out things like PE, dissection, brain bleed/tumor, etc.  I also wanted to order basic labs.  However the patient continued to be hyperfocused on her room.  Offered room 9 but patient declined.  Stated that she want 1 with a better bed.  Advised that we do not have one at this point but we be happy to treat her and possibly get her 1 later when 1 opens.  She declined again.  She intermittently raised her voice and would shout about not having a bed.  Multiple people attempted to start an IV but the patient declined each time.  I myself went over and attempted to start an IV but patient declined.  Advised that we needed 1 to draw blood.  Advised that we needed to put it in above her wrist in order to get CT scan with contrast.  Patient declined each time.  After multiple attempts to reason with the patient she continued to refuse and eventually stopped talking to us.  At this point we had no choice but to remove her from  the ED because she was being disruptive and impeding treatment and being noncompliant with her own care.  Given multiple chances and advised that she would be removed if she did not she was to comply but patient declined and then stopped talking to us.  Advised that she risked death, serious injury, pain, organ failure, or more if she did not go through with the workup, and we could not say for sure whether she had a more serious ailment.  Also advised that she needed her medication over that she should also could die.  Did not say anything and would not sign the paperwork for AMA.  Discharged AGAINST MEDICAL ADVICE with PD.        Procedure  Procedures     Ko Thompson, SUZI-TATO  09/23/24 4614

## 2024-11-29 ENCOUNTER — APPOINTMENT (OUTPATIENT)
Dept: CARDIOLOGY | Facility: HOSPITAL | Age: 49
End: 2024-11-29
Payer: COMMERCIAL

## 2024-11-29 ENCOUNTER — APPOINTMENT (OUTPATIENT)
Dept: RADIOLOGY | Facility: HOSPITAL | Age: 49
End: 2024-11-29
Payer: COMMERCIAL

## 2024-11-29 ENCOUNTER — HOSPITAL ENCOUNTER (EMERGENCY)
Facility: HOSPITAL | Age: 49
Discharge: OTHER INSTITUTION | End: 2024-11-30
Attending: STUDENT IN AN ORGANIZED HEALTH CARE EDUCATION/TRAINING PROGRAM
Payer: COMMERCIAL

## 2024-11-29 DIAGNOSIS — F20.9 SCHIZOPHRENIA, UNSPECIFIED TYPE: Primary | ICD-10-CM

## 2024-11-29 LAB
ALBUMIN SERPL BCP-MCNC: 4.2 G/DL (ref 3.4–5)
ALP SERPL-CCNC: 78 U/L (ref 33–110)
ALT SERPL W P-5'-P-CCNC: 9 U/L (ref 7–45)
AMPHETAMINES UR QL SCN: NORMAL
ANION GAP SERPL CALC-SCNC: 14 MMOL/L (ref 10–20)
APAP SERPL-MCNC: <10 UG/ML
APPEARANCE UR: CLEAR
AST SERPL W P-5'-P-CCNC: 26 U/L (ref 9–39)
BARBITURATES UR QL SCN: NORMAL
BASOPHILS # BLD AUTO: 0.01 X10*3/UL (ref 0–0.1)
BASOPHILS NFR BLD AUTO: 0.1 %
BENZODIAZ UR QL SCN: NORMAL
BILIRUB SERPL-MCNC: 0.5 MG/DL (ref 0–1.2)
BILIRUB UR STRIP.AUTO-MCNC: NEGATIVE MG/DL
BUN SERPL-MCNC: 15 MG/DL (ref 6–23)
BZE UR QL SCN: NORMAL
CALCIUM SERPL-MCNC: 8.1 MG/DL (ref 8.6–10.3)
CANNABINOIDS UR QL SCN: NORMAL
CARDIAC TROPONIN I PNL SERPL HS: <3 NG/L (ref 0–13)
CHLORIDE SERPL-SCNC: 100 MMOL/L (ref 98–107)
CO2 SERPL-SCNC: 25 MMOL/L (ref 21–32)
COLOR UR: NORMAL
CREAT SERPL-MCNC: 0.84 MG/DL (ref 0.5–1.05)
EGFRCR SERPLBLD CKD-EPI 2021: 86 ML/MIN/1.73M*2
EOSINOPHIL # BLD AUTO: 0.01 X10*3/UL (ref 0–0.7)
EOSINOPHIL NFR BLD AUTO: 0.1 %
ERYTHROCYTE [DISTWIDTH] IN BLOOD BY AUTOMATED COUNT: 13.8 % (ref 11.5–14.5)
ETHANOL SERPL-MCNC: <10 MG/DL
FENTANYL+NORFENTANYL UR QL SCN: NORMAL
FLUAV RNA RESP QL NAA+PROBE: NOT DETECTED
FLUBV RNA RESP QL NAA+PROBE: NOT DETECTED
GLUCOSE SERPL-MCNC: 75 MG/DL (ref 74–99)
GLUCOSE UR STRIP.AUTO-MCNC: NORMAL MG/DL
HCG UR QL IA.RAPID: NEGATIVE
HCT VFR BLD AUTO: 40.6 % (ref 36–46)
HGB BLD-MCNC: 13.4 G/DL (ref 12–16)
IMM GRANULOCYTES # BLD AUTO: 0.02 X10*3/UL (ref 0–0.7)
IMM GRANULOCYTES NFR BLD AUTO: 0.2 % (ref 0–0.9)
KETONES UR STRIP.AUTO-MCNC: NEGATIVE MG/DL
LEUKOCYTE ESTERASE UR QL STRIP.AUTO: NEGATIVE
LYMPHOCYTES # BLD AUTO: 2.68 X10*3/UL (ref 1.2–4.8)
LYMPHOCYTES NFR BLD AUTO: 27.8 %
MAGNESIUM SERPL-MCNC: 1.53 MG/DL (ref 1.6–2.4)
MCH RBC QN AUTO: 28.8 PG (ref 26–34)
MCHC RBC AUTO-ENTMCNC: 33 G/DL (ref 32–36)
MCV RBC AUTO: 87 FL (ref 80–100)
METHADONE UR QL SCN: NORMAL
MONOCYTES # BLD AUTO: 0.81 X10*3/UL (ref 0.1–1)
MONOCYTES NFR BLD AUTO: 8.4 %
NEUTROPHILS # BLD AUTO: 6.12 X10*3/UL (ref 1.2–7.7)
NEUTROPHILS NFR BLD AUTO: 63.4 %
NITRITE UR QL STRIP.AUTO: NEGATIVE
NRBC BLD-RTO: 0 /100 WBCS (ref 0–0)
OPIATES UR QL SCN: NORMAL
OXYCODONE+OXYMORPHONE UR QL SCN: NORMAL
PCP UR QL SCN: NORMAL
PH UR STRIP.AUTO: 7 [PH]
PLATELET # BLD AUTO: 282 X10*3/UL (ref 150–450)
POTASSIUM SERPL-SCNC: 4.5 MMOL/L (ref 3.5–5.3)
PROT SERPL-MCNC: 7.4 G/DL (ref 6.4–8.2)
PROT UR STRIP.AUTO-MCNC: NEGATIVE MG/DL
RBC # BLD AUTO: 4.65 X10*6/UL (ref 4–5.2)
RBC # UR STRIP.AUTO: NEGATIVE /UL
SALICYLATES SERPL-MCNC: <3 MG/DL
SARS-COV-2 RNA RESP QL NAA+PROBE: NOT DETECTED
SODIUM SERPL-SCNC: 134 MMOL/L (ref 136–145)
SP GR UR STRIP.AUTO: 1.02
UROBILINOGEN UR STRIP.AUTO-MCNC: NORMAL MG/DL
WBC # BLD AUTO: 9.7 X10*3/UL (ref 4.4–11.3)

## 2024-11-29 PROCEDURE — 81003 URINALYSIS AUTO W/O SCOPE: CPT

## 2024-11-29 PROCEDURE — 80307 DRUG TEST PRSMV CHEM ANLYZR: CPT

## 2024-11-29 PROCEDURE — 2500000004 HC RX 250 GENERAL PHARMACY W/ HCPCS (ALT 636 FOR OP/ED): Performed by: STUDENT IN AN ORGANIZED HEALTH CARE EDUCATION/TRAINING PROGRAM

## 2024-11-29 PROCEDURE — 96366 THER/PROPH/DIAG IV INF ADDON: CPT

## 2024-11-29 PROCEDURE — 81025 URINE PREGNANCY TEST: CPT | Performed by: STUDENT IN AN ORGANIZED HEALTH CARE EDUCATION/TRAINING PROGRAM

## 2024-11-29 PROCEDURE — 80320 DRUG SCREEN QUANTALCOHOLS: CPT

## 2024-11-29 PROCEDURE — 83735 ASSAY OF MAGNESIUM: CPT | Performed by: STUDENT IN AN ORGANIZED HEALTH CARE EDUCATION/TRAINING PROGRAM

## 2024-11-29 PROCEDURE — 93005 ELECTROCARDIOGRAM TRACING: CPT | Mod: 59

## 2024-11-29 PROCEDURE — 84075 ASSAY ALKALINE PHOSPHATASE: CPT

## 2024-11-29 PROCEDURE — 36415 COLL VENOUS BLD VENIPUNCTURE: CPT

## 2024-11-29 PROCEDURE — 99285 EMERGENCY DEPT VISIT HI MDM: CPT | Mod: 25

## 2024-11-29 PROCEDURE — 84484 ASSAY OF TROPONIN QUANT: CPT

## 2024-11-29 PROCEDURE — 2500000002 HC RX 250 W HCPCS SELF ADMINISTERED DRUGS (ALT 637 FOR MEDICARE OP, ALT 636 FOR OP/ED)

## 2024-11-29 PROCEDURE — 2500000001 HC RX 250 WO HCPCS SELF ADMINISTERED DRUGS (ALT 637 FOR MEDICARE OP)

## 2024-11-29 PROCEDURE — 85025 COMPLETE CBC W/AUTO DIFF WBC: CPT

## 2024-11-29 PROCEDURE — 96365 THER/PROPH/DIAG IV INF INIT: CPT

## 2024-11-29 PROCEDURE — 87636 SARSCOV2 & INF A&B AMP PRB: CPT

## 2024-11-29 PROCEDURE — 93005 ELECTROCARDIOGRAM TRACING: CPT

## 2024-11-29 RX ORDER — HYDROCORTISONE 5 MG/1
5 TABLET ORAL NIGHTLY
Status: COMPLETED | OUTPATIENT
Start: 2024-11-29 | End: 2024-11-29

## 2024-11-29 RX ORDER — MAGNESIUM SULFATE HEPTAHYDRATE 40 MG/ML
2 INJECTION, SOLUTION INTRAVENOUS ONCE
Status: COMPLETED | OUTPATIENT
Start: 2024-11-29 | End: 2024-11-30

## 2024-11-29 RX ORDER — FLUDROCORTISONE ACETATE 0.1 MG/1
0.1 TABLET ORAL DAILY
Status: DISCONTINUED | OUTPATIENT
Start: 2024-11-29 | End: 2024-11-30 | Stop reason: HOSPADM

## 2024-11-29 RX ORDER — HYDROCORTISONE 10 MG/1
10 TABLET ORAL ONCE
Status: COMPLETED | OUTPATIENT
Start: 2024-11-29 | End: 2024-11-29

## 2024-11-29 RX ADMIN — MAGNESIUM SULFATE HEPTAHYDRATE 2 G: 40 INJECTION, SOLUTION INTRAVENOUS at 22:31

## 2024-11-29 RX ADMIN — HYDROCORTISONE 10 MG: 10 TABLET ORAL at 14:50

## 2024-11-29 RX ADMIN — HYDROCORTISONE 5 MG: 5 TABLET ORAL at 22:11

## 2024-11-29 RX ADMIN — FLUDROCORTISONE ACETATE 0.1 MG: 0.1 TABLET ORAL at 14:50

## 2024-11-29 SDOH — HEALTH STABILITY: MENTAL HEALTH: SUICIDE ASSESSMENT: ADULT (C-SSRS)

## 2024-11-29 SDOH — HEALTH STABILITY: MENTAL HEALTH: DELUSIONS: PARANOID

## 2024-11-29 SDOH — HEALTH STABILITY: MENTAL HEALTH: BEHAVIORS/MOOD: SLEEPING

## 2024-11-29 SDOH — HEALTH STABILITY: MENTAL HEALTH: HAVE YOU EVER DONE ANYTHING, STARTED TO DO ANYTHING, OR PREPARED TO DO ANYTHING TO END YOUR LIFE?: NO

## 2024-11-29 SDOH — HEALTH STABILITY: MENTAL HEALTH: HAVE YOU WISHED YOU WERE DEAD OR WISHED YOU COULD GO TO SLEEP AND NOT WAKE UP?: NO

## 2024-11-29 SDOH — SOCIAL STABILITY: SOCIAL NETWORK: EMOTIONAL SUPPORT GIVEN: REASSURE

## 2024-11-29 SDOH — HEALTH STABILITY: MENTAL HEALTH: HAVE YOU EVER TRIED TO KILL YOURSELF?: NO

## 2024-11-29 SDOH — HEALTH STABILITY: MENTAL HEALTH: FOR HIGH RISK PATIENTS: ALL INTERVENTIONS ABOVE, PLUS:;1:1 PATIENT OBSERVER AT ALL TIMES

## 2024-11-29 SDOH — HEALTH STABILITY: MENTAL HEALTH: BEHAVIORAL HEALTH(WDL): EXCEPTIONS TO WDL

## 2024-11-29 SDOH — HEALTH STABILITY: MENTAL HEALTH
OTHER SUICIDE PRECAUTIONS INCLUDE: PATIENT PLACED IN AN EASILY OBSERVABLE ROOM WITH DOOR/CURTAIN REMAINING OPEN;PATIENT PLACED IN GOWN (SNAPS OR PAPER GOWNS PREFERRED) AND WANDED;REMAINING RISKS IDENTIFIED AND MITIGATED;PATIENT PLACED IN PSYCH SAFE ROOM (IF AVAILABLE);PROVIDER NOTIFIED;EL

## 2024-11-29 SDOH — HEALTH STABILITY: MENTAL HEALTH: BEHAVIORS/MOOD: AGITATED

## 2024-11-29 SDOH — HEALTH STABILITY: MENTAL HEALTH: HAVE YOU ACTUALLY HAD ANY THOUGHTS OF KILLING YOURSELF?: NO

## 2024-11-29 SDOH — HEALTH STABILITY: MENTAL HEALTH: NEEDS EXPRESSED: PHYSICAL

## 2024-11-29 SDOH — HEALTH STABILITY: MENTAL HEALTH: IN THE PAST FEW WEEKS, HAVE YOU FELT THAT YOU OR YOUR FAMILY WOULD BE BETTER OFF IF YOU WERE DEAD?: NO

## 2024-11-29 SDOH — HEALTH STABILITY: MENTAL HEALTH: BEHAVIORS/MOOD: CALM;COOPERATIVE

## 2024-11-29 SDOH — ECONOMIC STABILITY: HOUSING INSECURITY: FEELS SAFE LIVING IN HOME: YES

## 2024-11-29 SDOH — SOCIAL STABILITY: SOCIAL NETWORK: PARENT/GUARDIAN/SIGNIFICANT OTHER INVOLVEMENT: NO INVOLVEMENT

## 2024-11-29 SDOH — HEALTH STABILITY: MENTAL HEALTH: IN THE PAST FEW WEEKS, HAVE YOU WISHED YOU WERE DEAD?: NO

## 2024-11-29 SDOH — HEALTH STABILITY: MENTAL HEALTH: CONTENT: DELUSIONS

## 2024-11-29 SDOH — HEALTH STABILITY: MENTAL HEALTH: NEEDS EXPRESSED: DENIES

## 2024-11-29 SDOH — HEALTH STABILITY: MENTAL HEALTH: NON-SPECIFIC ACTIVE SUICIDAL THOUGHTS (PAST 1 MONTH): NO

## 2024-11-29 SDOH — HEALTH STABILITY: MENTAL HEALTH: SLEEP PATTERN: SLEEPS ALL NIGHT

## 2024-11-29 SDOH — HEALTH STABILITY: MENTAL HEALTH: IN THE PAST WEEK, HAVE YOU BEEN HAVING THOUGHTS ABOUT KILLING YOURSELF?: NO

## 2024-11-29 SDOH — HEALTH STABILITY: MENTAL HEALTH: SUICIDAL BEHAVIOR (LIFETIME): NO

## 2024-11-29 SDOH — HEALTH STABILITY: MENTAL HEALTH: ARE YOU HAVING THOUGHTS OF KILLING YOURSELF RIGHT NOW?: NO

## 2024-11-29 SDOH — HEALTH STABILITY: MENTAL HEALTH: DEPRESSION SYMPTOMS: NO PROBLEMS REPORTED OR OBSERVED.

## 2024-11-29 SDOH — SOCIAL STABILITY: SOCIAL INSECURITY: FAMILY BEHAVIORS: UNABLE TO ASSESS

## 2024-11-29 SDOH — SOCIAL STABILITY: SOCIAL NETWORK: VISITOR BEHAVIORS: UNABLE TO ASSESS

## 2024-11-29 SDOH — HEALTH STABILITY: MENTAL HEALTH: WISH TO BE DEAD (PAST 1 MONTH): NO

## 2024-11-29 SDOH — HEALTH STABILITY: MENTAL HEALTH

## 2024-11-29 SDOH — HEALTH STABILITY: MENTAL HEALTH: ANXIETY SYMPTOMS: NO PROBLEMS REPORTED OR OBSERVED.

## 2024-11-29 ASSESSMENT — PAIN SCALES - GENERAL
PAINLEVEL_OUTOF10: 0 - NO PAIN

## 2024-11-29 ASSESSMENT — COLUMBIA-SUICIDE SEVERITY RATING SCALE - C-SSRS
1. IN THE PAST MONTH, HAVE YOU WISHED YOU WERE DEAD OR WISHED YOU COULD GO TO SLEEP AND NOT WAKE UP?: NO
6. HAVE YOU EVER DONE ANYTHING, STARTED TO DO ANYTHING, OR PREPARED TO DO ANYTHING TO END YOUR LIFE?: NO
6. HAVE YOU EVER DONE ANYTHING, STARTED TO DO ANYTHING, OR PREPARED TO DO ANYTHING TO END YOUR LIFE?: NO
2. HAVE YOU ACTUALLY HAD ANY THOUGHTS OF KILLING YOURSELF?: NO
1. SINCE LAST CONTACT, HAVE YOU WISHED YOU WERE DEAD OR WISHED YOU COULD GO TO SLEEP AND NOT WAKE UP?: NO
2. HAVE YOU ACTUALLY HAD ANY THOUGHTS OF KILLING YOURSELF?: NO

## 2024-11-29 ASSESSMENT — LIFESTYLE VARIABLES
PRESCIPTION_ABUSE_PAST_12_MONTHS: NO
SUBSTANCE_ABUSE_PAST_12_MONTHS: NO

## 2024-11-29 ASSESSMENT — PAIN - FUNCTIONAL ASSESSMENT: PAIN_FUNCTIONAL_ASSESSMENT: 0-10
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
## 2024-11-29 NOTE — PROGRESS NOTES
This 79-year-old female that was signed out to me by the previous physician medically cleared pending EPAT placement.  Patient's been resting comfortably she did request her home medications these were ordered.  EPAT did request a repeat EKG this was performed her QTc is slightly prolonged I did add on a magnesium level which was found to be low therefore her magnesium level is being replenished.  Plan to repeat EKG status post magnesium.  Per EPAT they also requested pregnancy test which was added on this is negative.  Patient was signed out to the oncoming physician pending EPAT placement recommendations and repeat EKG.  Patient still resting comfortably and cooperative.    ED Course as of 11/30/24 0209 Fri Nov 29, 2024   1019 GLUCOSE: 75 [AT]   1019 SODIUM(!): 134 [AT]   1019 POTASSIUM: 4.5 [AT]   1019 Blood Urea Nitrogen: 15 [AT]   1019 Creatinine: 0.84 [AT]   1019 WBC: 9.7 [AT]   1019 HEMOGLOBIN: 13.4 [AT]   1019 Troponin I, High Sensitivity: <3 [AT]   1019 Alcohol: <10 [AT]   1019 Salicylate : <3 [AT]   1019 Acetaminophen: <10.0 [AT]   1112 I independently interpreted the EKG:  Regular rate. Regular rhythm. Normal axis.   Normal SD, QRS. prolonged Qtc interval.   No ST segment elevations, depressions, or T wave inversions. [AT]   1926 Interpreted by the Emergency Department Attending: ECG revealed normal sinus rhythm at a rate of 86 beats per minute with SD interval 168 , QRS of 60 , QTc of 498.  No acute injury pattern. Previous EKG on [] revealed [] changes.    [MG]   2355 Interpreted by the Emergency Department Attending: ECG revealed normal sinus rhythm at a rate of 63 beats per minute with SD interval 161 , QRS of 74 , QTc of 532.  No acute injury pattern.  [MG]      ED Course User Index  [AT] Esther Franz MD  [MG] Omar Jeffers DO         Diagnoses as of 11/30/24 0209   Schizophrenia, unspecified type

## 2024-11-29 NOTE — ED PROVIDER NOTES
Emergency Department Provider Note        History of Present Illness     History provided by: Patient  Limitations to History: None  External Records Reviewed with Brief Summary:   Admission 24 brought in by PD after she was found screaming on streets, hypotensive and altered on arrival, treated with IV steroids for her adrenal insufficiency and admitted to psych inpatient after medicine admission  24 chest pain SOB ED visit CTPE negative discharged    Per prior note:   Melchor's. Home med: hydrocortisone 15mg QAM, 10mg 2PM, 5mg Qbedtime, fludrocortisone 0.1mg.     HPI:  Kriss Malloy is a 49 y.o. female with past medical history of hypoparathyroidism, Melchor's disease, paranoid schizophrenia presents the emergency department for shortness of breath, weakness starting today.  She reports that she forgot to take her medications for the past 2 days, however has had inconsistent history.  Reported that a provider that she was traveling and ran out of meds. Patient denies fever, chest pain, abdominal pain, diarrhea, constipation, dysuria.  Patient denies SI, HI, AVH.    Physical Exam   Triage vitals:  T 37 °C (98.6 °F)  HR 83  BP 96/60  RR 17  O2 99 % None (Room air)    General: Awake, alert, in no acute distress  Eyes: Gaze conjugate.  No scleral icterus or injection  HENT: Normo-cephalic, atraumatic. No stridor  CV: Regular rate, regular rhythm. Radial pulses 2+ bilaterally  Resp: Breathing non-labored, speaking in full sentences.  Clear to auscultation bilaterally  GI: Soft, non-distended, non-tender. No rebound or guarding.  MSK/Extremities: No gross bony deformities. Moving all extremities  Skin: Warm. Appropriate color  Neuro: Alert. Oriented. Face symmetric. Speech is fluent.  Gross strength and sensation intact in b/l UE and LEs  Psych: Appropriate mood and affect    Medical Decision Making & ED Course   Medical Decision Makin y.o. female with past medical history of  hypoparathyroidism, Melchor's disease, paranoid schizophrenia presents the emergency department for shortness of breath, weakness starting today.  Patient was hypotensive on arrival otherwise hemodynamically stable.  Given vitals and off medications concern for adrenal crisis.  Exam largely unremarkable.    Given weakness, viral swabs ordered to rule out viral infection as cause.  Chest x-ray ordered given shortness of breath to rule out any pneumothorax, pleural effusion, pneumonia.  ACS workup pursued.  Patient was mumbling to herself and has inconsistent history, UA and U tox ordered to rule out any tox, drug causes.  Labs ordered to assess for any electrolyte derangements. EPAT consulted given patient internally stimulated and talking to self. Patient given home meds. Labs unremarkable. Similar prior mild hyponatremia as compared to 2 months ago.    Based on medical evaluation and work-up, the patient is MEDICALLY CLEARED at this time for further psychiatric evaluation, stabilization and management, including admission to psychiatric facility for definitive care.     The patient has been placed by EPAT.  Once placed, a pink slip and transfer note will be placed in the chart.  We will continue to monitor and manage the patient in the emergency department until transport for the transfer can be arranged.     ----  Differential diagnoses considered include but are not limited to: Adrenal crisis, ACS, pneumonia, pleural effusion, pneumothorax, hypoglycemia, hyponatremia, hyperkalemia, COVID, flu, viral URI, tox     Social Determinants of Health which Significantly Impact Care: None identified     EKG Independent Interpretation: EKG interpreted by myself. Please see ED Course for full interpretation.    Independent Result Review and Interpretation: Relevant laboratory and radiographic results were reviewed and independently interpreted by myself.  As necessary, they are commented on in the ED Course.    Chronic  conditions affecting the patient's care: As documented above in [facility]    The patient was discussed with the following consultants/services:  EPAT    Care Considerations: As documented above in [facility]    ED Course:  ED Course as of 11/30/24 0833   Fri Nov 29, 2024   1019 GLUCOSE: 75 [AT]   1019 SODIUM(!): 134 [AT]   1019 POTASSIUM: 4.5 [AT]   1019 Blood Urea Nitrogen: 15 [AT]   1019 Creatinine: 0.84 [AT]   1019 WBC: 9.7 [AT]   1019 HEMOGLOBIN: 13.4 [AT]   1019 Troponin I, High Sensitivity: <3 [AT]   1019 Alcohol: <10 [AT]   1019 Salicylate : <3 [AT]   1019 Acetaminophen: <10.0 [AT]   1112 I independently interpreted the EKG:  Regular rate. Regular rhythm. Normal axis.   Normal HI, QRS. prolonged Qtc interval.   No ST segment elevations, depressions, or T wave inversions. [AT]   1926 Interpreted by the Emergency Department Attending: ECG revealed normal sinus rhythm at a rate of 86 beats per minute with HI interval 168 , QRS of 60 , QTc of 498.  No acute injury pattern. Previous EKG on [] revealed [] changes.    [MG]   2355 Interpreted by the Emergency Department Attending: ECG revealed normal sinus rhythm at a rate of 63 beats per minute with HI interval 161 , QRS of 74 , QTc of 532.  No acute injury pattern.  [MG]      ED Course User Index  [AT] Esther Franz MD  [MG] Omar Jeffers DO         Diagnoses as of 11/30/24 0833   Schizophrenia, unspecified type     Disposition   The patient to be placed by the ED behavioral health team.  A pink slip and transfer note will be placed in the chart.  We will continue to monitor and manage the patient in the emergency department until transport for the transfer can be arranged.    Procedures   Procedures    Patient seen and discussed with ED attending physician.    Esther Franz MD  Emergency Medicine               Esther Franz MD  Resident  11/30/24 0834

## 2024-11-29 NOTE — ED TRIAGE NOTES
Pt arrives to ED via EMS [facility]  pt reports feeling sick sts she hasn't taken meds for her addyson and did not eat today.

## 2024-11-30 ENCOUNTER — APPOINTMENT (OUTPATIENT)
Dept: CARDIOLOGY | Facility: HOSPITAL | Age: 49
End: 2024-11-30
Payer: COMMERCIAL

## 2024-11-30 VITALS
HEIGHT: 66 IN | OXYGEN SATURATION: 98 % | BODY MASS INDEX: 16.07 KG/M2 | SYSTOLIC BLOOD PRESSURE: 103 MMHG | RESPIRATION RATE: 14 BRPM | DIASTOLIC BLOOD PRESSURE: 59 MMHG | TEMPERATURE: 97.7 F | WEIGHT: 100 LBS | HEART RATE: 69 BPM

## 2024-11-30 LAB
CARDIAC TROPONIN I PNL SERPL HS: <3 NG/L (ref 0–13)
HOLD SPECIMEN: NORMAL
MAGNESIUM SERPL-MCNC: 2.04 MG/DL (ref 1.6–2.4)

## 2024-11-30 PROCEDURE — 84484 ASSAY OF TROPONIN QUANT: CPT

## 2024-11-30 PROCEDURE — 36415 COLL VENOUS BLD VENIPUNCTURE: CPT | Performed by: STUDENT IN AN ORGANIZED HEALTH CARE EDUCATION/TRAINING PROGRAM

## 2024-11-30 PROCEDURE — 83735 ASSAY OF MAGNESIUM: CPT | Performed by: STUDENT IN AN ORGANIZED HEALTH CARE EDUCATION/TRAINING PROGRAM

## 2024-11-30 RX ORDER — CALCITRIOL 0.25 UG/1
0.25 CAPSULE ORAL DAILY
Status: DISCONTINUED | OUTPATIENT
Start: 2024-11-30 | End: 2024-11-30 | Stop reason: HOSPADM

## 2024-11-30 RX ORDER — HYDROCORTISONE 5 MG/1
15 TABLET ORAL EVERY MORNING
Status: DISCONTINUED | OUTPATIENT
Start: 2024-11-30 | End: 2024-11-30 | Stop reason: HOSPADM

## 2024-11-30 RX ORDER — HYDROCORTISONE 5 MG/1
5 TABLET ORAL NIGHTLY
Status: DISCONTINUED | OUTPATIENT
Start: 2024-11-30 | End: 2024-11-30 | Stop reason: HOSPADM

## 2024-11-30 SDOH — HEALTH STABILITY: MENTAL HEALTH: IN THE PAST FEW WEEKS, HAVE YOU FELT THAT YOU OR YOUR FAMILY WOULD BE BETTER OFF IF YOU WERE DEAD?: NO

## 2024-11-30 SDOH — HEALTH STABILITY: MENTAL HEALTH: BEHAVIORAL HEALTH(WDL): EXCEPTIONS TO WDL

## 2024-11-30 SDOH — HEALTH STABILITY: MENTAL HEALTH: BEHAVIORS/MOOD: SLEEPING

## 2024-11-30 SDOH — HEALTH STABILITY: MENTAL HEALTH: BEHAVIORS/MOOD: CALM;COOPERATIVE

## 2024-11-30 SDOH — HEALTH STABILITY: MENTAL HEALTH: ARE YOU HAVING THOUGHTS OF KILLING YOURSELF RIGHT NOW?: NO

## 2024-11-30 SDOH — HEALTH STABILITY: MENTAL HEALTH: IN THE PAST WEEK, HAVE YOU BEEN HAVING THOUGHTS ABOUT KILLING YOURSELF?: NO

## 2024-11-30 SDOH — HEALTH STABILITY: MENTAL HEALTH: DELUSIONS: PARANOID

## 2024-11-30 SDOH — HEALTH STABILITY: MENTAL HEALTH

## 2024-11-30 SDOH — HEALTH STABILITY: MENTAL HEALTH: IN THE PAST FEW WEEKS, HAVE YOU WISHED YOU WERE DEAD?: NO

## 2024-11-30 SDOH — SOCIAL STABILITY: SOCIAL NETWORK: EMOTIONAL SUPPORT GIVEN: REASSURE

## 2024-11-30 SDOH — HEALTH STABILITY: MENTAL HEALTH: CONTENT: DELUSIONS

## 2024-11-30 SDOH — HEALTH STABILITY: MENTAL HEALTH: NEEDS EXPRESSED: DENIES

## 2024-11-30 SDOH — HEALTH STABILITY: MENTAL HEALTH: HAVE YOU EVER TRIED TO KILL YOURSELF?: NO

## 2024-11-30 ASSESSMENT — PAIN SCALES - GENERAL: PAINLEVEL_OUTOF10: 0 - NO PAIN

## 2024-11-30 NOTE — SIGNIFICANT EVENT
Application for Emergency Admission      Ready for Transfer?  Is the patient medically cleared for transfer to inpatient psychiatry: Yes  Has the patient been accepted to an inpatient psychiatric hospital: Yes    Application for Emergency Admission  IN ACCORDANCE [place]  The Chief Clinical Officer of: [facility] 11/30/2024 .1:07 AM    Reason for Hospitalization  The undersigned has reason to believe that: Kriss Malloy Is a mentally ill person subject to hospitalization by court order under division B Section 5122.01 of the Revised Code, i.e., this person:    1.Yes  Represents a substantial risk of physical harm to self as manifested by evidence of threats of, or attempts at, suicide or serious self-inflicted bodily harm    2.No Represents a substantial risk of physical harm to others as manifested by evidence of recent homicidal or other violent behavior, evidence of recent threats that place another in reasonable fear of violent behavior and serious physical harm, or other evidence of present dangerousness    3.Yes Represents a substantial and immediate risk of serious physical impairment or injury to self as manifested by  evidence that the person is unable to provide for and is not providing for the person's basic physical needs because of the person's mental illness and that appropriate provision for those needs cannot be made  immediately available in the community    4.Yes Would benefit from treatment in a hospital for his mental illness and is in need of such treatment as manifested by evidence of behavior that creates a grave and imminent risk to substantial rights of others or  himself.    5.Yes Would benefit from treatment as manifested by evidence of behavior that indicates all of the following:       (a) The person is unlikely to survive safely in the community without supervision, based on a clinical determination.       (b) The person has a history of lack of compliance with  treatment for mental illness and one of the following applies:      (i) At least twice within the thirty-six months prior to the filing of an affidavit seeking court-ordered treatment of the person under section 5122.111 of the Revised Code, the lack of compliance has been a significant factor in necessitating hospitalization in a hospital or receipt of services in a forensic or other mental health unit of a correctional facility, provided that the thirty-six-month period shall be extended by the length of any hospitalization or incarceration of the person that occurred within the thirty-six-month period.      (ii) Within the forty-eight months prior to the filing of an affidavit seeking court-ordered treatment of the person under section 5122.111 of the Revised Code, the lack of compliance resulted in one or more acts of serious violent behavior toward self or others or threats of, or attempts at, serious physical harm to self or others, provided that the forty-eight-month period shall be extended by the length of any hospitalization or incarceration of the person that occurred within the forty-eight-month period.      (c) The person, as a result of mental illness, is unlikely to voluntarily participate in necessary treatment.       (d) In view of the person's treatment history and current behavior, the person is in need of treatment in order to prevent a relapse or deterioration that would be likely to result in substantial risk of serious harm to the person or others.    (e) Represents a substantial risk of physical harm to self or others if allowed to remain at liberty pending examination.    Therefore, it is requested that said person be admitted to the above named facility.    STATEMENT OF BELIEF    Must be filled out by one of the following: a psychiatrist, licensed physician, licensed clinical psychologist, health or ,  or .  (Statement shall include the circumstances under  which the individual was taken into custody and the reason for the person's belief that hospitalization is necessary. The statement shall also include a reference to efforts made to secure the individual's property at his residence if he was taken into custody there. Every reasonable and appropriate effort should be made to take this person into custody in the least conspicuous manner possible.)    Patient responding to internal stimuli, antisocial behavior  John Lomas MD 11/30/2024     _____________________________________________________________   Place of Employment: [employer]    STATEMENT OF OBSERVATION BY PSYCHIATRIST, LICENSED PHYSICIAN, OR LICENSED CLINICAL PSYCHOLOGIST, IF APPLICABLE    Place of Observation (e.g., [facility] mental health center, general hospital, office, emergency facility)  (If applicable, please complete)    John Lomas MD 11/30/2024    _____________________________________________________________

## 2024-12-08 LAB
ATRIAL RATE: 63 BPM
ATRIAL RATE: 70 BPM
P AXIS: 49 DEGREES
P AXIS: 53 DEGREES
PR INTERVAL: 139 MS
PR INTERVAL: 161 MS
Q ONSET: 252 MS
Q ONSET: 253 MS
QRS COUNT: 10 BEATS
QRS COUNT: 12 BEATS
QRS DURATION: 74 MS
QRS DURATION: 95 MS
QT INTERVAL: 463 MS
QT INTERVAL: 519 MS
QTC CALCULATION(BAZETT): 511 MS
QTC CALCULATION(BAZETT): 532 MS
QTC FREDERICIA: 494 MS
QTC FREDERICIA: 527 MS
R AXIS: 40 DEGREES
R AXIS: 53 DEGREES
T AXIS: 3 DEGREES
T AXIS: 39 DEGREES
T OFFSET: 483 MS
T OFFSET: 513 MS
VENTRICULAR RATE: 63 BPM
VENTRICULAR RATE: 73 BPM

## 2024-12-27 ENCOUNTER — HOSPITAL ENCOUNTER (INPATIENT)
Facility: HOSPITAL | Age: 49
Discharge: HOME | End: 2024-12-27
Attending: STUDENT IN AN ORGANIZED HEALTH CARE EDUCATION/TRAINING PROGRAM | Admitting: INTERNAL MEDICINE
Payer: COMMERCIAL

## 2024-12-27 ENCOUNTER — APPOINTMENT (OUTPATIENT)
Dept: CARDIOLOGY | Facility: HOSPITAL | Age: 49
End: 2024-12-27
Payer: COMMERCIAL

## 2024-12-27 ENCOUNTER — APPOINTMENT (OUTPATIENT)
Dept: RADIOLOGY | Facility: HOSPITAL | Age: 49
End: 2024-12-27
Payer: COMMERCIAL

## 2024-12-27 DIAGNOSIS — E27.40 ADRENAL INSUFFICIENCY (MULTI): ICD-10-CM

## 2024-12-27 DIAGNOSIS — F20.0 PARANOID SCHIZOPHRENIA (MULTI): ICD-10-CM

## 2024-12-27 DIAGNOSIS — E83.42 HYPOMAGNESEMIA: ICD-10-CM

## 2024-12-27 DIAGNOSIS — R53.1 GENERALIZED WEAKNESS: Primary | ICD-10-CM

## 2024-12-27 DIAGNOSIS — E83.51 HYPOCALCEMIA: ICD-10-CM

## 2024-12-27 DIAGNOSIS — E27.1 ADDISON'S DISEASE (MULTI): ICD-10-CM

## 2024-12-27 LAB
ALBUMIN SERPL BCP-MCNC: 3.8 G/DL (ref 3.4–5)
ALP SERPL-CCNC: 70 U/L (ref 33–110)
ALT SERPL W P-5'-P-CCNC: 9 U/L (ref 7–45)
ANION GAP SERPL CALC-SCNC: 14 MMOL/L (ref 10–20)
APPEARANCE UR: CLEAR
AST SERPL W P-5'-P-CCNC: 17 U/L (ref 9–39)
B-HCG SERPL-ACNC: 4 MIU/ML
BASOPHILS # BLD AUTO: 0.01 X10*3/UL (ref 0–0.1)
BASOPHILS NFR BLD AUTO: 0.1 %
BILIRUB SERPL-MCNC: 0.5 MG/DL (ref 0–1.2)
BILIRUB UR STRIP.AUTO-MCNC: NEGATIVE MG/DL
BUN SERPL-MCNC: 18 MG/DL (ref 6–23)
CA-I BLD-SCNC: 0.88 MMOL/L (ref 1.1–1.33)
CALCIUM SERPL-MCNC: 7.3 MG/DL (ref 8.6–10.3)
CHLORIDE SERPL-SCNC: 100 MMOL/L (ref 98–107)
CO2 SERPL-SCNC: 26 MMOL/L (ref 21–32)
COLOR UR: ABNORMAL
CREAT SERPL-MCNC: 0.97 MG/DL (ref 0.5–1.05)
EGFRCR SERPLBLD CKD-EPI 2021: 73 ML/MIN/1.73M*2
EOSINOPHIL # BLD AUTO: 0.01 X10*3/UL (ref 0–0.7)
EOSINOPHIL NFR BLD AUTO: 0.1 %
ERYTHROCYTE [DISTWIDTH] IN BLOOD BY AUTOMATED COUNT: 13.2 % (ref 11.5–14.5)
FLUAV RNA RESP QL NAA+PROBE: NOT DETECTED
FLUBV RNA RESP QL NAA+PROBE: NOT DETECTED
GLUCOSE BLD MANUAL STRIP-MCNC: 163 MG/DL (ref 74–99)
GLUCOSE SERPL-MCNC: 73 MG/DL (ref 74–99)
GLUCOSE UR STRIP.AUTO-MCNC: NORMAL MG/DL
HCG UR QL IA.RAPID: NEGATIVE
HCT VFR BLD AUTO: 37.9 % (ref 36–46)
HGB BLD-MCNC: 13 G/DL (ref 12–16)
HOLD SPECIMEN: NORMAL
IMM GRANULOCYTES # BLD AUTO: 0.03 X10*3/UL (ref 0–0.7)
IMM GRANULOCYTES NFR BLD AUTO: 0.3 % (ref 0–0.9)
KETONES UR STRIP.AUTO-MCNC: NEGATIVE MG/DL
LEUKOCYTE ESTERASE UR QL STRIP.AUTO: ABNORMAL
LYMPHOCYTES # BLD AUTO: 2.63 X10*3/UL (ref 1.2–4.8)
LYMPHOCYTES NFR BLD AUTO: 29.1 %
MAGNESIUM SERPL-MCNC: 1.32 MG/DL (ref 1.6–2.4)
MCH RBC QN AUTO: 29.4 PG (ref 26–34)
MCHC RBC AUTO-ENTMCNC: 34.3 G/DL (ref 32–36)
MCV RBC AUTO: 86 FL (ref 80–100)
MONOCYTES # BLD AUTO: 0.39 X10*3/UL (ref 0.1–1)
MONOCYTES NFR BLD AUTO: 4.3 %
MUCOUS THREADS #/AREA URNS AUTO: NORMAL /LPF
NEUTROPHILS # BLD AUTO: 5.98 X10*3/UL (ref 1.2–7.7)
NEUTROPHILS NFR BLD AUTO: 66.1 %
NITRITE UR QL STRIP.AUTO: NEGATIVE
NRBC BLD-RTO: 0 /100 WBCS (ref 0–0)
PH UR STRIP.AUTO: 7.5 [PH]
PLATELET # BLD AUTO: 310 X10*3/UL (ref 150–450)
POTASSIUM SERPL-SCNC: 4.2 MMOL/L (ref 3.5–5.3)
PROT SERPL-MCNC: 7.1 G/DL (ref 6.4–8.2)
PROT UR STRIP.AUTO-MCNC: NEGATIVE MG/DL
RBC # BLD AUTO: 4.42 X10*6/UL (ref 4–5.2)
RBC # UR STRIP.AUTO: NEGATIVE /UL
RBC #/AREA URNS AUTO: NORMAL /HPF
SARS-COV-2 RNA RESP QL NAA+PROBE: NOT DETECTED
SODIUM SERPL-SCNC: 136 MMOL/L (ref 136–145)
SP GR UR STRIP.AUTO: 1.01
TSH SERPL-ACNC: 0.93 MIU/L (ref 0.44–3.98)
UROBILINOGEN UR STRIP.AUTO-MCNC: NORMAL MG/DL
WBC # BLD AUTO: 9.1 X10*3/UL (ref 4.4–11.3)
WBC #/AREA URNS AUTO: NORMAL /HPF

## 2024-12-27 PROCEDURE — 36415 COLL VENOUS BLD VENIPUNCTURE: CPT | Performed by: STUDENT IN AN ORGANIZED HEALTH CARE EDUCATION/TRAINING PROGRAM

## 2024-12-27 PROCEDURE — 2500000004 HC RX 250 GENERAL PHARMACY W/ HCPCS (ALT 636 FOR OP/ED): Mod: JZ | Performed by: STUDENT IN AN ORGANIZED HEALTH CARE EDUCATION/TRAINING PROGRAM

## 2024-12-27 PROCEDURE — 99285 EMERGENCY DEPT VISIT HI MDM: CPT | Performed by: STUDENT IN AN ORGANIZED HEALTH CARE EDUCATION/TRAINING PROGRAM

## 2024-12-27 PROCEDURE — 93005 ELECTROCARDIOGRAM TRACING: CPT

## 2024-12-27 PROCEDURE — 82330 ASSAY OF CALCIUM: CPT | Performed by: STUDENT IN AN ORGANIZED HEALTH CARE EDUCATION/TRAINING PROGRAM

## 2024-12-27 PROCEDURE — 84075 ASSAY ALKALINE PHOSPHATASE: CPT | Performed by: STUDENT IN AN ORGANIZED HEALTH CARE EDUCATION/TRAINING PROGRAM

## 2024-12-27 PROCEDURE — 81025 URINE PREGNANCY TEST: CPT | Performed by: STUDENT IN AN ORGANIZED HEALTH CARE EDUCATION/TRAINING PROGRAM

## 2024-12-27 PROCEDURE — 2500000001 HC RX 250 WO HCPCS SELF ADMINISTERED DRUGS (ALT 637 FOR MEDICARE OP)

## 2024-12-27 PROCEDURE — 83735 ASSAY OF MAGNESIUM: CPT | Performed by: STUDENT IN AN ORGANIZED HEALTH CARE EDUCATION/TRAINING PROGRAM

## 2024-12-27 PROCEDURE — 87086 URINE CULTURE/COLONY COUNT: CPT | Mod: PARLAB | Performed by: STUDENT IN AN ORGANIZED HEALTH CARE EDUCATION/TRAINING PROGRAM

## 2024-12-27 PROCEDURE — 2500000004 HC RX 250 GENERAL PHARMACY W/ HCPCS (ALT 636 FOR OP/ED)

## 2024-12-27 PROCEDURE — 81001 URINALYSIS AUTO W/SCOPE: CPT | Performed by: STUDENT IN AN ORGANIZED HEALTH CARE EDUCATION/TRAINING PROGRAM

## 2024-12-27 PROCEDURE — 2500000001 HC RX 250 WO HCPCS SELF ADMINISTERED DRUGS (ALT 637 FOR MEDICARE OP): Performed by: PHYSICIAN ASSISTANT

## 2024-12-27 PROCEDURE — 82947 ASSAY GLUCOSE BLOOD QUANT: CPT

## 2024-12-27 PROCEDURE — 2500000004 HC RX 250 GENERAL PHARMACY W/ HCPCS (ALT 636 FOR OP/ED): Performed by: PHYSICIAN ASSISTANT

## 2024-12-27 PROCEDURE — 96374 THER/PROPH/DIAG INJ IV PUSH: CPT

## 2024-12-27 PROCEDURE — 87636 SARSCOV2 & INF A&B AMP PRB: CPT | Performed by: STUDENT IN AN ORGANIZED HEALTH CARE EDUCATION/TRAINING PROGRAM

## 2024-12-27 PROCEDURE — 2060000001 HC INTERMEDIATE ICU ROOM DAILY

## 2024-12-27 PROCEDURE — 85025 COMPLETE CBC W/AUTO DIFF WBC: CPT | Performed by: STUDENT IN AN ORGANIZED HEALTH CARE EDUCATION/TRAINING PROGRAM

## 2024-12-27 PROCEDURE — 99222 1ST HOSP IP/OBS MODERATE 55: CPT | Performed by: PHYSICIAN ASSISTANT

## 2024-12-27 PROCEDURE — 84702 CHORIONIC GONADOTROPIN TEST: CPT | Performed by: PHYSICIAN ASSISTANT

## 2024-12-27 PROCEDURE — 84443 ASSAY THYROID STIM HORMONE: CPT | Performed by: STUDENT IN AN ORGANIZED HEALTH CARE EDUCATION/TRAINING PROGRAM

## 2024-12-27 PROCEDURE — 96361 HYDRATE IV INFUSION ADD-ON: CPT

## 2024-12-27 RX ORDER — LANOLIN ALCOHOL/MO/W.PET/CERES
400 CREAM (GRAM) TOPICAL ONCE
Status: COMPLETED | OUTPATIENT
Start: 2024-12-27 | End: 2024-12-27

## 2024-12-27 RX ORDER — ACETAMINOPHEN 650 MG/1
650 SUPPOSITORY RECTAL EVERY 4 HOURS PRN
Status: DISCONTINUED | OUTPATIENT
Start: 2024-12-27 | End: 2025-01-01 | Stop reason: HOSPADM

## 2024-12-27 RX ORDER — ACETAMINOPHEN 500 MG
5 TABLET ORAL NIGHTLY PRN
Status: DISCONTINUED | OUTPATIENT
Start: 2024-12-27 | End: 2025-01-01 | Stop reason: HOSPADM

## 2024-12-27 RX ORDER — HYDROCORTISONE 5 MG/1
10 TABLET ORAL
Status: ON HOLD | COMMUNITY
Start: 2018-01-10 | End: 2024-12-29

## 2024-12-27 RX ORDER — SODIUM CHLORIDE, SODIUM LACTATE, POTASSIUM CHLORIDE, CALCIUM CHLORIDE 600; 310; 30; 20 MG/100ML; MG/100ML; MG/100ML; MG/100ML
75 INJECTION, SOLUTION INTRAVENOUS CONTINUOUS
Status: ACTIVE | OUTPATIENT
Start: 2024-12-27 | End: 2024-12-28

## 2024-12-27 RX ORDER — DOCUSATE SODIUM 100 MG/1
100 CAPSULE, LIQUID FILLED ORAL 2 TIMES DAILY PRN
Status: DISCONTINUED | OUTPATIENT
Start: 2024-12-27 | End: 2025-01-01 | Stop reason: HOSPADM

## 2024-12-27 RX ORDER — CALCITRIOL 0.25 UG/1
0.25 CAPSULE ORAL 2 TIMES DAILY
Status: DISCONTINUED | OUTPATIENT
Start: 2024-12-27 | End: 2025-01-01 | Stop reason: HOSPADM

## 2024-12-27 RX ORDER — FLUDROCORTISONE ACETATE 0.1 MG/1
0.1 TABLET ORAL DAILY
Status: DISCONTINUED | OUTPATIENT
Start: 2024-12-28 | End: 2025-01-01 | Stop reason: HOSPADM

## 2024-12-27 RX ORDER — ACETAMINOPHEN 160 MG/5ML
650 SOLUTION ORAL EVERY 4 HOURS PRN
Status: DISCONTINUED | OUTPATIENT
Start: 2024-12-27 | End: 2025-01-01 | Stop reason: HOSPADM

## 2024-12-27 RX ORDER — MIDODRINE HYDROCHLORIDE 10 MG/1
10 TABLET ORAL ONCE
Status: COMPLETED | OUTPATIENT
Start: 2024-12-27 | End: 2024-12-27

## 2024-12-27 RX ORDER — MAGNESIUM SULFATE HEPTAHYDRATE 40 MG/ML
2 INJECTION, SOLUTION INTRAVENOUS ONCE
Status: COMPLETED | OUTPATIENT
Start: 2024-12-27 | End: 2024-12-27

## 2024-12-27 RX ORDER — ACETAMINOPHEN 325 MG/1
650 TABLET ORAL EVERY 4 HOURS PRN
Status: DISCONTINUED | OUTPATIENT
Start: 2024-12-27 | End: 2025-01-01 | Stop reason: HOSPADM

## 2024-12-27 RX ORDER — CALCIUM GLUCONATE 20 MG/ML
2 INJECTION, SOLUTION INTRAVENOUS ONCE
Status: COMPLETED | OUTPATIENT
Start: 2024-12-27 | End: 2024-12-27

## 2024-12-27 RX ADMIN — CALCIUM GLUCONATE 2 G: 20 INJECTION, SOLUTION INTRAVENOUS at 16:07

## 2024-12-27 RX ADMIN — SODIUM CHLORIDE, POTASSIUM CHLORIDE, SODIUM LACTATE AND CALCIUM CHLORIDE 75 ML/HR: 600; 310; 30; 20 INJECTION, SOLUTION INTRAVENOUS at 20:06

## 2024-12-27 RX ADMIN — HYDROCORTISONE SODIUM SUCCINATE 100 MG: 100 INJECTION, POWDER, FOR SOLUTION INTRAMUSCULAR; INTRAVENOUS at 10:29

## 2024-12-27 RX ADMIN — SODIUM CHLORIDE, POTASSIUM CHLORIDE, SODIUM LACTATE AND CALCIUM CHLORIDE 1000 ML: 600; 310; 30; 20 INJECTION, SOLUTION INTRAVENOUS at 14:20

## 2024-12-27 RX ADMIN — HYDROCORTISONE SODIUM SUCCINATE 50 MG: 100 INJECTION, POWDER, FOR SOLUTION INTRAMUSCULAR; INTRAVENOUS at 20:05

## 2024-12-27 RX ADMIN — MAGNESIUM OXIDE TAB 400 MG (241.3 MG ELEMENTAL MG) 400 MG: 400 (241.3 MG) TAB at 12:54

## 2024-12-27 RX ADMIN — CALCITRIOL CAPSULES 0.25 MCG 0.25 MCG: 0.25 CAPSULE ORAL at 21:26

## 2024-12-27 RX ADMIN — MIDODRINE HYDROCHLORIDE 10 MG: 10 TABLET ORAL at 20:06

## 2024-12-27 RX ADMIN — MAGNESIUM SULFATE HEPTAHYDRATE 2 G: 40 INJECTION, SOLUTION INTRAVENOUS at 17:35

## 2024-12-27 RX ADMIN — SODIUM CHLORIDE, POTASSIUM CHLORIDE, SODIUM LACTATE AND CALCIUM CHLORIDE 1000 ML: 600; 310; 30; 20 INJECTION, SOLUTION INTRAVENOUS at 10:33

## 2024-12-27 SDOH — SOCIAL STABILITY: SOCIAL INSECURITY
WITHIN THE LAST YEAR, HAVE YOU BEEN KICKED, HIT, SLAPPED, OR OTHERWISE PHYSICALLY HURT BY YOUR PARTNER OR EX-PARTNER?: NO

## 2024-12-27 SDOH — SOCIAL STABILITY: SOCIAL INSECURITY
WITHIN THE LAST YEAR, HAVE YOU BEEN RAPED OR FORCED TO HAVE ANY KIND OF SEXUAL ACTIVITY BY YOUR PARTNER OR EX-PARTNER?: NO

## 2024-12-27 SDOH — SOCIAL STABILITY: SOCIAL INSECURITY: HAVE YOU HAD ANY THOUGHTS OF HARMING ANYONE ELSE?: NO

## 2024-12-27 SDOH — ECONOMIC STABILITY: INCOME INSECURITY: IN THE PAST 12 MONTHS HAS THE ELECTRIC, GAS, OIL, OR WATER COMPANY THREATENED TO SHUT OFF SERVICES IN YOUR HOME?: NO

## 2024-12-27 SDOH — SOCIAL STABILITY: SOCIAL INSECURITY: DOES ANYONE TRY TO KEEP YOU FROM HAVING/CONTACTING OTHER FRIENDS OR DOING THINGS OUTSIDE YOUR HOME?: NO

## 2024-12-27 SDOH — SOCIAL STABILITY: SOCIAL INSECURITY: WERE YOU ABLE TO COMPLETE ALL THE BEHAVIORAL HEALTH SCREENINGS?: YES

## 2024-12-27 SDOH — SOCIAL STABILITY: SOCIAL INSECURITY: WITHIN THE LAST YEAR, HAVE YOU BEEN AFRAID OF YOUR PARTNER OR EX-PARTNER?: NO

## 2024-12-27 SDOH — ECONOMIC STABILITY: FOOD INSECURITY: WITHIN THE PAST 12 MONTHS, THE FOOD YOU BOUGHT JUST DIDN'T LAST AND YOU DIDN'T HAVE MONEY TO GET MORE.: NEVER TRUE

## 2024-12-27 SDOH — SOCIAL STABILITY: SOCIAL INSECURITY: ARE YOU OR HAVE YOU BEEN THREATENED OR ABUSED PHYSICALLY, EMOTIONALLY, OR SEXUALLY BY ANYONE?: NO

## 2024-12-27 SDOH — SOCIAL STABILITY: SOCIAL INSECURITY: DO YOU FEEL UNSAFE GOING BACK TO THE PLACE WHERE YOU ARE LIVING?: NO

## 2024-12-27 SDOH — SOCIAL STABILITY: SOCIAL INSECURITY: ARE THERE ANY APPARENT SIGNS OF INJURIES/BEHAVIORS THAT COULD BE RELATED TO ABUSE/NEGLECT?: NO

## 2024-12-27 SDOH — ECONOMIC STABILITY: FOOD INSECURITY: WITHIN THE PAST 12 MONTHS, YOU WORRIED THAT YOUR FOOD WOULD RUN OUT BEFORE YOU GOT THE MONEY TO BUY MORE.: NEVER TRUE

## 2024-12-27 SDOH — SOCIAL STABILITY: SOCIAL INSECURITY: DO YOU FEEL ANYONE HAS EXPLOITED OR TAKEN ADVANTAGE OF YOU FINANCIALLY OR OF YOUR PERSONAL PROPERTY?: NO

## 2024-12-27 SDOH — SOCIAL STABILITY: SOCIAL INSECURITY: HAVE YOU HAD THOUGHTS OF HARMING ANYONE ELSE?: NO

## 2024-12-27 SDOH — SOCIAL STABILITY: SOCIAL INSECURITY: ABUSE: ADULT

## 2024-12-27 SDOH — SOCIAL STABILITY: SOCIAL INSECURITY: WITHIN THE LAST YEAR, HAVE YOU BEEN HUMILIATED OR EMOTIONALLY ABUSED IN OTHER WAYS BY YOUR PARTNER OR EX-PARTNER?: NO

## 2024-12-27 SDOH — SOCIAL STABILITY: SOCIAL INSECURITY: HAS ANYONE EVER THREATENED TO HURT YOUR FAMILY OR YOUR PETS?: NO

## 2024-12-27 ASSESSMENT — LIFESTYLE VARIABLES
TOTAL SCORE: 0
AUDIT-C TOTAL SCORE: 0
HAVE YOU EVER FELT YOU SHOULD CUT DOWN ON YOUR DRINKING: NO
EVER HAD A DRINK FIRST THING IN THE MORNING TO STEADY YOUR NERVES TO GET RID OF A HANGOVER: NO
EVER FELT BAD OR GUILTY ABOUT YOUR DRINKING: NO
PRESCIPTION_ABUSE_PAST_12_MONTHS: NO
HAVE PEOPLE ANNOYED YOU BY CRITICIZING YOUR DRINKING: NO
SUBSTANCE_ABUSE_PAST_12_MONTHS: NO
HOW MANY STANDARD DRINKS CONTAINING ALCOHOL DO YOU HAVE ON A TYPICAL DAY: PATIENT DOES NOT DRINK
HOW OFTEN DO YOU HAVE 6 OR MORE DRINKS ON ONE OCCASION: NEVER
SKIP TO QUESTIONS 9-10: 1
AUDIT-C TOTAL SCORE: 0
HOW OFTEN DO YOU HAVE A DRINK CONTAINING ALCOHOL: NEVER

## 2024-12-27 ASSESSMENT — COLUMBIA-SUICIDE SEVERITY RATING SCALE - C-SSRS
6. HAVE YOU EVER DONE ANYTHING, STARTED TO DO ANYTHING, OR PREPARED TO DO ANYTHING TO END YOUR LIFE?: NO
1. IN THE PAST MONTH, HAVE YOU WISHED YOU WERE DEAD OR WISHED YOU COULD GO TO SLEEP AND NOT WAKE UP?: NO
1. IN THE PAST MONTH, HAVE YOU WISHED YOU WERE DEAD OR WISHED YOU COULD GO TO SLEEP AND NOT WAKE UP?: NO
2. HAVE YOU ACTUALLY HAD ANY THOUGHTS OF KILLING YOURSELF?: NO
6. HAVE YOU EVER DONE ANYTHING, STARTED TO DO ANYTHING, OR PREPARED TO DO ANYTHING TO END YOUR LIFE?: NO
2. HAVE YOU ACTUALLY HAD ANY THOUGHTS OF KILLING YOURSELF?: NO

## 2024-12-27 ASSESSMENT — ENCOUNTER SYMPTOMS
DIAPHORESIS: 0
NAUSEA: 0
DIZZINESS: 0
HALLUCINATIONS: 0
CHILLS: 0
CHEST TIGHTNESS: 0
FEVER: 0
HEMATURIA: 0
DIARRHEA: 0
PALPITATIONS: 0
CONFUSION: 0
WHEEZING: 0
ABDOMINAL PAIN: 0
JOINT SWELLING: 0
VOMITING: 0
SHORTNESS OF BREATH: 1
CONSTIPATION: 0
SORE THROAT: 0
HEADACHES: 0
FATIGUE: 1
COUGH: 0
DYSURIA: 0

## 2024-12-27 ASSESSMENT — COGNITIVE AND FUNCTIONAL STATUS - GENERAL
MOBILITY SCORE: 24
DAILY ACTIVITIY SCORE: 24
DAILY ACTIVITIY SCORE: 24
MOBILITY SCORE: 24
PATIENT BASELINE BEDBOUND: NO

## 2024-12-27 ASSESSMENT — PATIENT HEALTH QUESTIONNAIRE - PHQ9
1. LITTLE INTEREST OR PLEASURE IN DOING THINGS: NOT AT ALL
2. FEELING DOWN, DEPRESSED OR HOPELESS: NOT AT ALL
SUM OF ALL RESPONSES TO PHQ9 QUESTIONS 1 & 2: 0

## 2024-12-27 ASSESSMENT — ACTIVITIES OF DAILY LIVING (ADL)
WALKS IN HOME: INDEPENDENT
ADEQUATE_TO_COMPLETE_ADL: YES
DRESSING YOURSELF: INDEPENDENT
TOILETING: INDEPENDENT
HEARING - RIGHT EAR: FUNCTIONAL
FEEDING YOURSELF: INDEPENDENT
JUDGMENT_ADEQUATE_SAFELY_COMPLETE_DAILY_ACTIVITIES: YES
GROOMING: INDEPENDENT
HEARING - LEFT EAR: FUNCTIONAL
LACK_OF_TRANSPORTATION: NO
PATIENT'S MEMORY ADEQUATE TO SAFELY COMPLETE DAILY ACTIVITIES?: YES
BATHING: INDEPENDENT

## 2024-12-27 ASSESSMENT — PAIN - FUNCTIONAL ASSESSMENT: PAIN_FUNCTIONAL_ASSESSMENT: 0-10

## 2024-12-27 ASSESSMENT — PAIN SCALES - GENERAL: PAINLEVEL_OUTOF10: 0 - NO PAIN

## 2024-12-27 NOTE — ED PROVIDER NOTES
Emergency Department Provider Note        History of Present Illness     History provided by: Patient  Limitations to History: None  External Records Reviewed with Brief Summary: Discharge Summary from 10/25/24 which showed patient with history of schizophrenia, was admitted at that time for acute psychosis .  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  She has paranoid schizophrenia, schizoaffective disorder, difficult housing situation.     HPI:  Kriss Malloy is a 49 y.o. female presenting to the emergency department with fatigue and shortness of breath.  States this happens when she has a flare of her Sarpy's disease.  States she has missed a dose or 2 of her Cortef and fludrocortisone in the past few days.  Denies any chest pain, cough, fevers.  Endorses chills.  Denies abdominal pain, nausea, vomiting.    Physical Exam   Triage vitals:  T 36.3 °C (97.3 °F)  HR (!) 107  /55  RR 18  O2 98 % None (Room air)    General: Awake, alert, in no acute distress  Eyes: Gaze conjugate.  No scleral icterus or injection  HENT: Normo-cephalic, atraumatic. No stridor  CV: Tachycardic rate, regular rhythm. Radial pulses 2+ bilaterally  Resp: Breathing non-labored, speaking in full sentences.  Clear to auscultation bilaterally.  No wheezes, rales, rhonchi  GI: Soft, non-distended, non-tender. No rebound or guarding.  MSK/Extremities: No gross bony deformities. Moving all extremities  Skin: Warm. Appropriate color  Neuro: Alert. Oriented. Face symmetric. Speech is fluent.  Gross strength and sensation intact in b/l UE and LEs  Psych: Rapid speech, but appropriate, has a linear conversation.  Does not appear internally stimulated.      Medical Decision Making & ED Course   Medical Decision Makin y.o. female with the above past medical history, most notable for Sarpy's disease presenting to the emergency department with complaint of  "fatigue, feeling like she is having a flare of her [place]'s disease.  States she has not taken any additional steroids.  States she is felt ill.  I recommended blood work and a chest x-ray, she initially declined blood work for nursing staff.  Eventually, did agree to blood work, but declined chest x-ray stating \"I know I do not have pneumonia.\"  She does not feel entirely better after receiving Solu-Cortef, IV fluids.  Will add on TSH, iCal.  We did give oral magnesium given slightly low magnesium level.  Given her persistent low blood pressure, will give additional IV fluids.  She states she has required admission in the past for adrenal crisis.  She is clinically well-appearing however.  I will it was suspicion for sepsis at this time.  She denies any fevers or chills.  She has no localizing source of infection.    We administered additional calcium fluids.  I discussed with hospitalist who accepted for admission.  Patient agreeable to this.  Placed a consult order for endocrinology.   ----      Differential diagnoses considered include but are not limited to: Electrolyte normalities, [place]'s crisis, sepsis, pneumonia     Social Determinants of Health which Significantly Impact Care: None identified     EKG Independent Interpretation: EKG interpreted by myself. Please see ED Course for full interpretation.    Independent Result Review and Interpretation: Relevant laboratory and radiographic results were reviewed and independently interpreted by myself.  As necessary, they are commented on in the ED Course.    Chronic conditions affecting the patient's care: As documented above in [facility]    The patient was discussed with the following consultants/services: Hospitalist/Admitting Provider who accepted the patient for admission    Care Considerations: As documented above in [facility]    ED Course:  ED Course as of 12/27/24 1539   Fri Dec 27, 2024   0744 ECG 12 lead  EKG obtained at 0729, interpreted by myself, demonstrates " normal sinus rhythm, ventricular 95, normal axis, normal intervals, no ST segment or T wave signs of ischemia. [SH]   1412 Reassessed, blood pressure still borderline, MAP approximately 65 at this time.  She is awake and mentating appropriately, still feeling fatigued.  We will plan to admit given her continued soft blood pressures and symptoms not being resolved after Solu-Cortef administration. [SH]      ED Course User Index  [SH] John Berry MD         Diagnoses as of 12/27/24 1539   Generalized weakness   Melchor's disease (Multi)   Adrenal insufficiency (Multi)   Hypomagnesemia   Hypocalcemia     Disposition   As a result of their workup, the patient will require admission to the hospital.  The patient was informed of her diagnosis.  The patient was given the opportunity to ask questions and I answered them. The patient agreed to be admitted to the hospital.    Procedures   Procedures        John Berry MD  Emergency Medicine     John Berry MD  12/27/24 [id number]

## 2024-12-27 NOTE — H&P
"History Of Present Illness  Kriss Malloy is a 49 y.o. female with PMH of polyglandular autoimmune deficiency (hypothyroidism, [place]'s disease and hypocalcemia), Schachenmann's syndrome, paranoid schizophrenia, and schizoaffective disorder presented to [facility] ED from home on 12/27/2024 with generalized weakness and shortness of breath. Pt is guarded during my interview but is calm and cooperative. She repeatedly states she is \"here for my [place]'s only.\" She cannot quantify exactly how long she has been feeling this way. Per ER report, the pt reported she has missed some of her medications but denies this to me and the pharmacy tech. She is not taking Calcitriol and is out of it but is willing to take it. She also appears to be out of Florinef but reports she has donovan taking it. She reports compliance with her Cortef. She denies fever, chills, headache, dizziness, chest pain, abdominal pain, dysuria, nausea, vomiting, diarrhea, or constipation. She has been prescribed Depakote and Seroquel but is not taking those because she does not need them. She denies SI or HI. She states she is not overly anxious or depressed but feels down some because she is so weak. She denies auditory or visual hallucinations. When I ask her how she communicates with her physicians, she says she cannot because she does not have a phone or car. She denies having a PCP but is open to having one. She is living with her sister but does not know how long that will last. ER evaluation today revealed hypotension, hypocalcemia, and hypomagnesemia. She was initially borderline hypoglycemic but this has resolved. Pt was recommended for admission and accepted on the service of Dr. Arciniega with consultation to endocrinology. [facility], social work, and  Healthy at Home Referral placed placed for discharge planning, establishing PCP, and assisting with resources so she can communicate with her medical professionals.      Past Medical History  As " above    Surgical History  Bilateral salpingectomy (2018) and oophorectomy, laparoscopic (Right, 2018).      Social History  Social History     Tobacco Use    Smoking status: Former     Types: Cigarettes    Tobacco comments:     Smoked as a teenager   Substance Use Topics    Alcohol use: Not Currently    Drug use: Not Currently        Family History  No family history on file.     Allergies  Patient has no known allergies.    Review of Systems   Constitutional:  Positive for fatigue. Negative for chills, diaphoresis and fever.   HENT:  Negative for congestion and sore throat.    Eyes:  Negative for visual disturbance.   Respiratory:  Positive for shortness of breath. Negative for cough, chest tightness and wheezing.    Cardiovascular:  Negative for chest pain, palpitations and leg swelling.   Gastrointestinal:  Negative for abdominal pain, constipation, diarrhea, nausea and vomiting.   Endocrine: Negative for polyuria.   Genitourinary:  Negative for dysuria and hematuria.   Musculoskeletal:  Negative for joint swelling.   Skin:  Negative for rash.   Allergic/Immunologic: Negative for immunocompromised state.   Neurological:  Negative for dizziness, syncope and headaches.   Psychiatric/Behavioral:  Negative for confusion and hallucinations.      Physical Exam  Constitutional:       Appearance: Normal appearance. She is normal weight.      Comments: Guarded during my interview but is calm and cooperative. No diaphoresis. In NAD.   HENT:      Head: Normocephalic and atraumatic.      Mouth/Throat:      Mouth: Mucous membranes are moist.   Eyes:      Conjunctiva/sclera: Conjunctivae normal.   Cardiovascular:      Rate and Rhythm: Normal rate and regular rhythm.      Heart sounds: No murmur heard.  Pulmonary:      Effort: No respiratory distress.      Breath sounds: No wheezing, rhonchi or rales.   Abdominal:      General: There is no distension.      Tenderness: There is no abdominal tenderness. There is no guarding.  "  Musculoskeletal:         General: No deformity.      Cervical back: No rigidity.   Skin:     General: Skin is warm.   Neurological:      General: No focal deficit present.      Mental Status: She is alert and oriented to person, place, and time.   Psychiatric:         Mood and Affect: Mood normal.       Last Recorded Vitals  Visit Vitals  BP 87/52 (BP Location: Right arm)   Pulse 98   Temp 36.2 °C (97.2 °F)   Resp 18   Ht 1.676 m (5' 6\")   Wt 54.4 kg (120 lb)   SpO2 99%   BMI 19.37 kg/m²   Smoking Status Former   BSA 1.59 m²        Relevant Results  Results for orders placed or performed during the hospital encounter of 12/27/24 (from the past 24 hours)   CBC and Auto Differential   Result Value Ref Range    WBC 9.1 4.4 - 11.3 x10*3/uL    nRBC 0.0 0.0 - 0.0 /100 WBCs    RBC 4.42 4.00 - 5.20 x10*6/uL    Hemoglobin 13.0 12.0 - 16.0 g/dL    Hematocrit 37.9 36.0 - 46.0 %    MCV 86 80 - 100 fL    MCH 29.4 26.0 - 34.0 pg    MCHC 34.3 32.0 - 36.0 g/dL    RDW 13.2 11.5 - 14.5 %    Platelets 310 150 - 450 x10*3/uL    Neutrophils % 66.1 40.0 - 80.0 %    Immature Granulocytes %, Automated 0.3 0.0 - 0.9 %    Lymphocytes % 29.1 13.0 - 44.0 %    Monocytes % 4.3 2.0 - 10.0 %    Eosinophils % 0.1 0.0 - 6.0 %    Basophils % 0.1 0.0 - 2.0 %    Neutrophils Absolute 5.98 1.20 - 7.70 x10*3/uL    Immature Granulocytes Absolute, Automated 0.03 0.00 - 0.70 x10*3/uL    Lymphocytes Absolute 2.63 1.20 - 4.80 x10*3/uL    Monocytes Absolute 0.39 0.10 - 1.00 x10*3/uL    Eosinophils Absolute 0.01 0.00 - 0.70 x10*3/uL    Basophils Absolute 0.01 0.00 - 0.10 x10*3/uL   Magnesium   Result Value Ref Range    Magnesium 1.32 (L) 1.60 - 2.40 mg/dL   Comprehensive metabolic panel   Result Value Ref Range    Glucose 73 (L) 74 - 99 mg/dL    Sodium 136 136 - 145 mmol/L    Potassium 4.2 3.5 - 5.3 mmol/L    Chloride 100 98 - 107 mmol/L    Bicarbonate 26 21 - 32 mmol/L    Anion Gap 14 10 - 20 mmol/L    Urea Nitrogen 18 6 - 23 mg/dL    Creatinine 0.97 0.50 - " 1.05 mg/dL    eGFR 73 >60 mL/min/1.73m*2    Calcium 7.3 (L) 8.6 - 10.3 mg/dL    Albumin 3.8 3.4 - 5.0 g/dL    Alkaline Phosphatase 70 33 - 110 U/L    Total Protein 7.1 6.4 - 8.2 g/dL    AST 17 9 - 39 U/L    Bilirubin, Total 0.5 0.0 - 1.2 mg/dL    ALT 9 7 - 45 U/L   Influenza A, and B PCR   Result Value Ref Range    Flu A Result Not Detected Not Detected    Flu B Result Not Detected Not Detected   Sars-CoV-2 PCR   Result Value Ref Range    Coronavirus 2019, PCR Not Detected Not Detected   hCG, Urine, Qualitative   Result Value Ref Range    HCG, Urine NEGATIVE NEGATIVE   Urinalysis with Reflex Culture and Microscopic   Result Value Ref Range    Color, Urine Light-Yellow Light-Yellow, Yellow, Dark-Yellow    Appearance, Urine Clear Clear    Specific Gravity, Urine 1.013 1.005 - 1.035    pH, Urine 7.5 5.0, 5.5, 6.0, 6.5, 7.0, 7.5, 8.0    Protein, Urine NEGATIVE NEGATIVE, 10 (TRACE), 20 (TRACE) mg/dL    Glucose, Urine Normal Normal mg/dL    Blood, Urine NEGATIVE NEGATIVE    Ketones, Urine NEGATIVE NEGATIVE mg/dL    Bilirubin, Urine NEGATIVE NEGATIVE    Urobilinogen, Urine Normal Normal mg/dL    Nitrite, Urine NEGATIVE NEGATIVE    Leukocyte Esterase, Urine 25 Kevon/µL (A) NEGATIVE   Microscopic Only, Urine   Result Value Ref Range    WBC, Urine 1-5 1-5, NONE /HPF    RBC, Urine NONE NONE, 1-2, 3-5 /HPF    Mucus, Urine FEW Reference range not established. /LPF   POCT GLUCOSE   Result Value Ref Range    POCT Glucose 163 (H) 74 - 99 mg/dL   TSH with reflex to Free T4 if abnormal   Result Value Ref Range    Thyroid Stimulating Hormone 0.93 0.44 - 3.98 mIU/L   Calcium, Ionized   Result Value Ref Range    POCT Calcium, Ionized 0.88 (L) 1.1 - 1.33 mmol/L   hCG, quantitative, pregnancy   Result Value Ref Range    HCG, Beta-Quantitative 4 <5 mIU/mL        Imaging         Home Medications  Prior to Admission medications    Medication Sig Start Date End Date Taking? Authorizing Provider   hydrocortisone (Cortef) 10 mg tablet TAKE 1  TABLET BY MOUTH ONCE DAILY AT 12PM  Patient taking differently: Take 1.5 tablets (15 mg) by mouth once daily in the morning. 7/22/23 12/27/24 Yes Steven MILLER MD   hydrocortisone (Cortef) 5 mg tablet Take 3 tablets in the morning, take 2 tablets in afternoon, take 1 at night  Patient taking differently: Take 1 tablet (5 mg) by mouth once daily in the evening. Take with meals. 9/12/24  Yes Emi Chaney MD   hydrocortisone (Cortef) 5 mg tablet Take 2 tablets (10 mg) by mouth once daily at noon. Take with meals. 1/10/18  Yes Historical Provider, MD   calcitriol (Rocaltrol) 0.25 mcg capsule TAKE 2 CAPSULES BY MOUTH ONCE A DAY  Patient not taking: Reported on 12/27/2024 7/22/23 7/21/24  Steven MILLER MD   divalproex (Depakote) 500 mg EC tablet Take 2 tablets (1,000 mg) by mouth once daily at bedtime. Do not crush, chew, or split.  Patient not taking: Reported on 12/27/2024    Historical Provider, MD   fludrocortisone (Florinef) 0.1 mg tablet Take 1 tablet (0.1 mg) by mouth once daily. 9/12/24 10/12/24  Emi Chaney MD   QUEtiapine (SEROquel) 25 mg tablet TAKE 1 TABLET BY MOUTH ONCE DAILY AT BEDTIME  Patient not taking: Reported on 12/27/2024 7/22/23 7/21/24  Steven MILLER MD   traZODone (Desyrel) 50 mg tablet Take 1 tablet (50 mg) by mouth once daily at bedtime.  12/27/24  Historical Provider, MD       Medications  Scheduled medications  calcitriol, 0.25 mcg, oral, BID  [START ON 12/28/2024] fludrocortisone, 0.1 mg, oral, Daily  hydrocortisone sodium succinate, 50 mg, intravenous, q8h  magnesium sulfate, 2 g, intravenous, Once      Continuous medications     PRN medications  acetaminophen, 650 mg, q4h PRN   Or  acetaminophen, 650 mg, q4h PRN   Or  acetaminophen, 650 mg, q4h PRN  docusate sodium, 100 mg, BID PRN  melatonin, 5 mg, Nightly PRN           Assessment/Plan   Assessment & Plan  Generalized weakness    Generalized weakness  Hypotension  Hypocalcemia  Hypomagnesemia  I polyglandular autoimmune  deficiency (hypothyroidism, Melchor's disease, hypocalcemia)  Schachenmann's syndrome  -Admitted to stepdown on tele  -Pt received 2L LR and IV Hydrocortisone 100mg in ED. Pt with SBP in 80s. Will continue IV Hydrocortisone 50mg Q8H.   -ER placed endocrinology consult  -Replacement of Ca and Mg provided  -Ionized calcium level, serum Mg, and BMP in am  -PT/OT evals    Paranoid schizophrenia  Schizoaffective disorder  -Pt with high ED utilization (~30 ED and/or admissions in last 6 months), recurrent homelessness, and numerous psychiatric hospitalizations. She has been prescribed psychiatric medications in the past but is not taking them. Shed denies SI or HI. Denies auditory or visual hallucinations. When I ask her how she communicates with her physicians, she says she cannot because she does not have a phone or car. She denies having a PCP but is open to having one. She is living with her sister but does not know how long that will last. [facility], social work, and  Healthy at Home Referral placed placed for discharge planning, establishing PCP, and assisting with resources so she can communicate with her medical professionals.    See additional orders for further plan of care.   Further evaluation and management per attending and consulting physicians.      Dunia Duron PA-C  [facility]

## 2024-12-27 NOTE — PROGRESS NOTES
Pharmacy Medication History Review    Kriss Malloy is a 49 y.o. female admitted for No Principal Problem: There is no principal problem currently on the Problem List. Please update the Problem List and refresh.. Pharmacy reviewed the patient's [id number] medications and allergies for accuracy.    The list below reflectives the updated PTA list. Please review each medication in order reconciliation for additional clarification and justification.  Prior to Admission medications    Medication Sig Start Date End Date Taking? Authorizing Provider   hydrocortisone (Cortef) 5 mg tablet Take 1 tablets (10 mg) by mouth once daily at noon. Take with meals. 1/10/18  Yes Historical Provider, MD   calcitriol (Rocaltrol) 0.25 mcg capsule TAKE 2 CAPSULES BY MOUTH ONCE A DAY  Patient not taking: Reported on 12/27/2024 7/22/23 7/21/24 no Steven MILLER MD   divalproex (Depakote) 500 mg EC tablet Take 2 tablets (1,000 mg) by mouth once daily at bedtime. Do not crush, chew, or split.  Patient not taking: Reported on 12/27/2024   no Historical Provider, MD   fludrocortisone (Florinef) 0.1 mg tablet Take 1 tablet (0.1 mg) by mouth once daily. 9/12/24 10/12/24 no Emi Chaney MD   hydrocortisone (Cortef) 10 mg tablet TAKE 1 TABLET BY MOUTH ONCE DAILY AT 12PM  Patient taking differently: Take 1.5 tablets (15 mg) by mouth once daily in the morning. 7/22/23 12/27/24 Yes Steven MILLER MD   hydrocortisone (Cortef) 5 mg tablet Take 3 tablets in the morning, take 2 tablets in afternoon, take 1 at night  Patient taking differently: Take 1 tablet (5 mg) by mouth once daily in the evening. Take with meals. 9/12/24  Yes Emi Chaney MD   QUEtiapine (SEROquel) 25 mg tablet TAKE 1 TABLET BY MOUTH ONCE DAILY AT BEDTIME  Patient not taking: Reported on 12/27/2024 7/22/23 7/21/24 no Steven MILLER MD   traZODone (Desyrel) 50 mg tablet Take 1 tablet (50 mg) by mouth once daily at bedtime.  12/27/24 no Historical MD Mark         The list below reflectives the updated allergy list. Please review each documented allergy for additional clarification and justification.  Allergies  Reviewed by Zandra Mckeon on 12/27/2024   No Known Allergies         Below are additional concerns with the patient's PTA list.      Zandra Mckeon

## 2024-12-27 NOTE — ED TRIAGE NOTES
Pt presents to ED c/o generalized weakness and fatigue that began this morning. Pt states she feels like she is lacking oxygen. Pt denies CP, cough, fevers, chills. Hx of marcial's.

## 2024-12-28 ENCOUNTER — APPOINTMENT (OUTPATIENT)
Dept: RADIOLOGY | Facility: HOSPITAL | Age: 49
End: 2024-12-28
Payer: COMMERCIAL

## 2024-12-28 LAB
ANION GAP SERPL CALC-SCNC: 13 MMOL/L (ref 10–20)
BACTERIA UR CULT: NORMAL
BUN SERPL-MCNC: 18 MG/DL (ref 6–23)
CA-I BLD-SCNC: 0.87 MMOL/L (ref 1.1–1.33)
CALCIUM SERPL-MCNC: 6.7 MG/DL (ref 8.6–10.3)
CHLORIDE SERPL-SCNC: 102 MMOL/L (ref 98–107)
CO2 SERPL-SCNC: 26 MMOL/L (ref 21–32)
CREAT SERPL-MCNC: 0.81 MG/DL (ref 0.5–1.05)
EGFRCR SERPLBLD CKD-EPI 2021: 90 ML/MIN/1.73M*2
ERYTHROCYTE [DISTWIDTH] IN BLOOD BY AUTOMATED COUNT: 12.8 % (ref 11.5–14.5)
GLUCOSE SERPL-MCNC: 185 MG/DL (ref 74–99)
HCT VFR BLD AUTO: 31.7 % (ref 36–46)
HGB BLD-MCNC: 10.7 G/DL (ref 12–16)
MAGNESIUM SERPL-MCNC: 1.54 MG/DL (ref 1.6–2.4)
MCH RBC QN AUTO: 29 PG (ref 26–34)
MCHC RBC AUTO-ENTMCNC: 33.8 G/DL (ref 32–36)
MCV RBC AUTO: 86 FL (ref 80–100)
NRBC BLD-RTO: 0 /100 WBCS (ref 0–0)
PLATELET # BLD AUTO: 285 X10*3/UL (ref 150–450)
POTASSIUM SERPL-SCNC: 3.7 MMOL/L (ref 3.5–5.3)
RBC # BLD AUTO: 3.69 X10*6/UL (ref 4–5.2)
SODIUM SERPL-SCNC: 137 MMOL/L (ref 136–145)
WBC # BLD AUTO: 10.2 X10*3/UL (ref 4.4–11.3)

## 2024-12-28 PROCEDURE — 71046 X-RAY EXAM CHEST 2 VIEWS: CPT

## 2024-12-28 PROCEDURE — 2500000001 HC RX 250 WO HCPCS SELF ADMINISTERED DRUGS (ALT 637 FOR MEDICARE OP): Performed by: INTERNAL MEDICINE

## 2024-12-28 PROCEDURE — 83735 ASSAY OF MAGNESIUM: CPT | Performed by: PHYSICIAN ASSISTANT

## 2024-12-28 PROCEDURE — 2500000001 HC RX 250 WO HCPCS SELF ADMINISTERED DRUGS (ALT 637 FOR MEDICARE OP): Performed by: PHYSICIAN ASSISTANT

## 2024-12-28 PROCEDURE — 82306 VITAMIN D 25 HYDROXY: CPT | Mod: PARLAB | Performed by: INTERNAL MEDICINE

## 2024-12-28 PROCEDURE — 80048 BASIC METABOLIC PNL TOTAL CA: CPT | Performed by: PHYSICIAN ASSISTANT

## 2024-12-28 PROCEDURE — 83970 ASSAY OF PARATHORMONE: CPT | Mod: PARLAB | Performed by: INTERNAL MEDICINE

## 2024-12-28 PROCEDURE — 2500000004 HC RX 250 GENERAL PHARMACY W/ HCPCS (ALT 636 FOR OP/ED): Performed by: PHYSICIAN ASSISTANT

## 2024-12-28 PROCEDURE — 85027 COMPLETE CBC AUTOMATED: CPT | Performed by: PHYSICIAN ASSISTANT

## 2024-12-28 PROCEDURE — 71046 X-RAY EXAM CHEST 2 VIEWS: CPT | Performed by: RADIOLOGY

## 2024-12-28 PROCEDURE — 82330 ASSAY OF CALCIUM: CPT | Performed by: PHYSICIAN ASSISTANT

## 2024-12-28 PROCEDURE — 2060000001 HC INTERMEDIATE ICU ROOM DAILY

## 2024-12-28 PROCEDURE — 36415 COLL VENOUS BLD VENIPUNCTURE: CPT | Performed by: INTERNAL MEDICINE

## 2024-12-28 PROCEDURE — 2500000004 HC RX 250 GENERAL PHARMACY W/ HCPCS (ALT 636 FOR OP/ED): Performed by: INTERNAL MEDICINE

## 2024-12-28 PROCEDURE — 36415 COLL VENOUS BLD VENIPUNCTURE: CPT | Performed by: PHYSICIAN ASSISTANT

## 2024-12-28 RX ORDER — CALCIUM CARBONATE 500(1250)
1250 TABLET ORAL
Status: DISCONTINUED | OUTPATIENT
Start: 2024-12-28 | End: 2024-12-28

## 2024-12-28 RX ORDER — SODIUM CHLORIDE, SODIUM LACTATE, POTASSIUM CHLORIDE, CALCIUM CHLORIDE 600; 310; 30; 20 MG/100ML; MG/100ML; MG/100ML; MG/100ML
75 INJECTION, SOLUTION INTRAVENOUS CONTINUOUS
Status: ACTIVE | OUTPATIENT
Start: 2024-12-28 | End: 2024-12-29

## 2024-12-28 RX ORDER — CALCIUM CARBONATE 1250 MG/5ML
1250 SUSPENSION ORAL
Status: DISCONTINUED | OUTPATIENT
Start: 2024-12-28 | End: 2024-12-28

## 2024-12-28 RX ORDER — MAGNESIUM SULFATE HEPTAHYDRATE 40 MG/ML
2 INJECTION, SOLUTION INTRAVENOUS ONCE
Status: COMPLETED | OUTPATIENT
Start: 2024-12-28 | End: 2024-12-28

## 2024-12-28 RX ORDER — MIDODRINE HYDROCHLORIDE 5 MG/1
5 TABLET ORAL EVERY 8 HOURS PRN
Status: DISCONTINUED | OUTPATIENT
Start: 2024-12-28 | End: 2025-01-01 | Stop reason: HOSPADM

## 2024-12-28 RX ORDER — CALCIUM CARBONATE 500(1250)
1250 TABLET ORAL
Status: DISCONTINUED | OUTPATIENT
Start: 2024-12-28 | End: 2025-01-01 | Stop reason: HOSPADM

## 2024-12-28 RX ADMIN — FLUDROCORTISONE ACETATE 0.1 MG: 0.1 TABLET ORAL at 17:51

## 2024-12-28 RX ADMIN — HYDROCORTISONE SODIUM SUCCINATE 50 MG: 100 INJECTION, POWDER, FOR SOLUTION INTRAMUSCULAR; INTRAVENOUS at 17:51

## 2024-12-28 RX ADMIN — CALCITRIOL CAPSULES 0.25 MCG 0.25 MCG: 0.25 CAPSULE ORAL at 22:05

## 2024-12-28 RX ADMIN — CALCITRIOL CAPSULES 0.25 MCG 0.25 MCG: 0.25 CAPSULE ORAL at 17:21

## 2024-12-28 RX ADMIN — HYDROCORTISONE SODIUM SUCCINATE 50 MG: 100 INJECTION, POWDER, FOR SOLUTION INTRAMUSCULAR; INTRAVENOUS at 03:22

## 2024-12-28 RX ADMIN — Medication 5 MG: at 17:51

## 2024-12-28 RX ADMIN — DOCUSATE SODIUM 100 MG: 100 CAPSULE, LIQUID FILLED ORAL at 17:22

## 2024-12-28 RX ADMIN — SODIUM CHLORIDE, SODIUM LACTATE, POTASSIUM CHLORIDE, AND CALCIUM CHLORIDE 75 ML/HR: 600; 310; 30; 20 INJECTION, SOLUTION INTRAVENOUS at 17:08

## 2024-12-28 RX ADMIN — CALCIUM 1250 MG: 500 TABLET ORAL at 17:52

## 2024-12-28 RX ADMIN — MAGNESIUM SULFATE HEPTAHYDRATE 2 G: 40 INJECTION, SOLUTION INTRAVENOUS at 17:58

## 2024-12-28 ASSESSMENT — COGNITIVE AND FUNCTIONAL STATUS - GENERAL
DAILY ACTIVITIY SCORE: 24
MOBILITY SCORE: 24
DAILY ACTIVITIY SCORE: 24
MOBILITY SCORE: 24

## 2024-12-28 ASSESSMENT — PAIN SCALES - GENERAL
PAINLEVEL_OUTOF10: 0 - NO PAIN
PAINLEVEL_OUTOF10: 0 - NO PAIN

## 2024-12-28 NOTE — CARE PLAN
The patient's goals for the shift include      The clinical goals for the shift include  comfort  Problem: Skin  Goal: Decreased wound size/increased tissue granulation at next dressing change  Outcome: Progressing  Goal: Participates in plan/prevention/treatment measures  Outcome: Progressing  Goal: Prevent/manage excess moisture  Outcome: Progressing  Goal: Prevent/minimize sheer/friction injuries  Outcome: Progressing  Goal: Promote/optimize nutrition  Outcome: Progressing  Goal: Promote skin healing  Outcome: Progressing

## 2024-12-28 NOTE — CARE PLAN
The patient's goals for the shift include      The clinical goals for the shift include patient will maintain comfort and safety and refrain from further complications    Over the shift, the patient will maintain comfort and safety and refrain from further complications

## 2024-12-28 NOTE — CONSULTS
Endocrinology Initial Inpatient Consult Note     PATIENT NAME: Kriss Malloy  MRN: [id number]  DATE: 12/28/2024    CONSULTING PHYSICIAN: Dr. Reji Arciniega   REASON FOR CONSULT: AI. Hypothyroidism.      History of Presenting Illness     49y F w. PMHx of:      # Schizophrenia      # APS t. 1  - Hypoparathyroidism  - Adrenal Insufficiency.    Patient was interviewed in the hallway as she was ambulating around the unit.  She stated to me that she preferred to have her interview done in the hallway rather than in her room. She presented to [place] complaining of dyspnea and weakness. Feels like she is a having a marcial crises. States she has is out meds. Cannot tell me how long she has been without. She states she is out of fludro as well as calitriol. States she still has hydrocortisone.    In the ER, CBC was unremarkable.  Magnesium level was found be low at 1.32.  CMP showed glucose of 73 mg/dL.  The patient's calcium was 7.3 with an albumin 3.8.  Influenza swab was negative.  SARS-CoV-2 swab was negative.  hCG was also negative.  Urinalysis was negative for any nitrite but was positive for leukocyte Estrace.  She did not have any pyuria.  Urine culture is showing a clinically insignificant growth.  Patient's TSH was normal at 0.93.  Ionized calcium was low at 0.88.  BMP this morning again showed hypocalcemia 6.7.  Her ionized calcium was low again.    In regards to adrenal insufficiency.  The patient reports that she takes hydrocortisone 15 mg in the morning, 10 mg in the afternoon, and 5 mg around bedtime.  She also takes fludrocortisone 0.1 mcg daily.  Patient denies taking the levothyroxine.  She denies having diabetes.  She states this is not on insulin.  She also reports that she takes calcitriol 0.25 mcg daily.  She will take calcium carbonate 500 mg twice daily.  Patient does not have a medical alert bracelet.  She does know what to do when she is sick.  She states that this is the reason why she is in  "the hospital.    Prior to my interview, the patient was seen walking around the unit talking to herself.      Review of Systems (Bold if Positive)     General: Fevers, Chills, Weight Loss, or Night Sweats  HEENT: Headaches or Blurry Vision  Cardiovascular: CP, Palpitations, Diaphoresis, or Peripheral Edema  Respiratory: Cough, Shortness of Breath, or Hemoptysis  Gastrointestinal: N/V, Abdominal Pain, Constipation, Diarrhea, Melena, Hematochezia  Genitourinary: Polyruia, Dysuria, Urgency   Endo: Polydipsia, Heat/Cold Intolerance, Hair/Skin/Nail Changes  Rheum: Joint Pain   MSK: LBP, Muscle Pain  Psych: Anxiety, Depression      Physical Examination     BP 92/51 (BP Location: Right arm)   Pulse 68   Temp 36.5 °C (97.7 °F) (Temporal)   Resp 17   Ht 1.702 m (5' 7\")   Wt 54 kg (119 lb)   SpO2 97%   BMI 18.64 kg/m²   General: No acute distress. A&Ox3. Ambulating around unit.  HEENT: PERRLA. EOMi. Ø Exophthalmos   Neck: No LAD.  Thyroid: 20g.  Ø Bruit  CV: Normal rate and rhythm. No murmurs or rubs auscultated.   Resp: CTA b/l. No wheezing or crackles.   Abdomen: Soft, nontender, nondistended abdomen. BSx4.   Extremities: No pedal edema b/l.  Neuro: CN II-XII Grossly Intact. Ø Tremor. Brisk Reflexes.   Psych: Tangential at times.  Skin: No apparent rashes      Past Medical / Surgical / Family / Social History     History reviewed. No pertinent past medical history.  History reviewed. No pertinent surgical history.  No family history on file.  Social History     Socioeconomic History    Marital status:      Spouse name: Not on file    Number of children: Not on file    Years of education: Not on file    Highest education level: Not on file   Occupational History    Not on file   Tobacco Use    Smoking status: Former     Types: Cigarettes    Smokeless tobacco: Not on file    Tobacco comments:     Smoked as a teenager   Substance and Sexual Activity    Alcohol use: Not Currently    Drug use: Not Currently    " Sexual activity: Not on file   Other Topics Concern    Not on file   Social History Narrative    Not on file     Social Drivers of Health     Financial Resource Strain: Low Risk  (12/27/2024)    Overall Financial Resource Strain (CARDIA)     Difficulty of Paying Living Expenses: Not very hard   Food Insecurity: No Food Insecurity (12/27/2024)    Hunger Vital Sign     Worried About Running Out of Food in the Last Year: Never true     Ran Out of Food in the Last Year: Never true   Transportation Needs: No Transportation Needs (12/27/2024)    PRAPARE - Transportation     Lack of Transportation (Medical): No     Lack of Transportation (Non-Medical): No   Physical Activity: Not on File (9/2/2021)    Received from [employer]    Physical Activity     Physical Activity: 0   Stress: Not on File (6/20/2022)    Received from [facility]    Stress     Stress: 0   Recent Concern: Stress - At Risk (6/20/2022)    Received from [facility]    Stress     Stress: 2   Social Connections: Not on File (6/20/2022)    Received from [employer]    Social Connections     Connectedness: 0   Intimate Partner Violence: Not At Risk (12/27/2024)    Humiliation, Afraid, Rape, and Kick questionnaire     Fear of Current or Ex-Partner: No     Emotionally Abused: No     Physically Abused: No     Sexually Abused: No   Housing Stability: Low Risk  (12/27/2024)    Housing Stability Vital Sign     Unable to Pay for Housing in the Last Year: No     Number of Times Moved in the Last Year: 1     Homeless in the Last Year: No       Medications & Allergies     MAR and PTA Meds Reviewed     No Known Allergies      Data     Recent Labs and Imaging Reviewed      Assessment / Plan       # Hypoparathyroidism  Hypocalcemic on Presentation.   On Calcitriol 0.25mcg BID at Home.   On Ca CO3 500mg BID at Home.    - Restart calcitriol 0.25mg BID  - Start Ca CO3 1200mg TID  - Obtain Vit D 25 OH    # Adrenal Insufficiency:   Home Regimen: HC 15-10-5mg. Fludrocortisone 0.1mcg  Daily.    Presented with nonspecific symptoms.  Na and K were ok.   Started on IV HC by Admitting NP.   OK for now.   Will deescalate to PO HC tomorrow.  Needs medical alert bracelet. Counseled.   Counseled about sick day rules.       I spent a total of 80 minutes on the date of the service which included preparing to see the patient, face-to-face patient care, completing clinical documentation, obtaining and/or reviewing separately obtained history, performing a medically appropriate examination, counseling and educating the patient/family/caregiver, ordering medications, tests, or procedures, communicating with other HCPs (not separately reported), independently interpreting results (not separately reported), communicating results to the patient/family/caregiver, and care coordination (not separately reported).     Vince Sharma, DO  Endocrinology, Diabetes, and Metabolism    Available via [employer] Messenger    Please excuse any typographical or unwanted errors within this documentation as voice recognition software was used to dictate this note.

## 2024-12-29 PROBLEM — R53.1 GENERALIZED WEAKNESS: Status: RESOLVED | Noted: 2024-12-27 | Resolved: 2024-12-29

## 2024-12-29 LAB
25(OH)D3 SERPL-MCNC: 35 NG/ML (ref 30–100)
ALBUMIN SERPL BCP-MCNC: 3.1 G/DL (ref 3.4–5)
ALP SERPL-CCNC: 51 U/L (ref 33–110)
ALT SERPL W P-5'-P-CCNC: 9 U/L (ref 7–45)
ANION GAP SERPL CALC-SCNC: 15 MMOL/L (ref 10–20)
AST SERPL W P-5'-P-CCNC: 14 U/L (ref 9–39)
BASOPHILS # BLD AUTO: 0 X10*3/UL (ref 0–0.1)
BASOPHILS NFR BLD AUTO: 0 %
BILIRUB SERPL-MCNC: 0.2 MG/DL (ref 0–1.2)
BUN SERPL-MCNC: 13 MG/DL (ref 6–23)
CALCIUM SERPL-MCNC: 6.5 MG/DL (ref 8.6–10.3)
CHLORIDE SERPL-SCNC: 103 MMOL/L (ref 98–107)
CO2 SERPL-SCNC: 26 MMOL/L (ref 21–32)
CREAT SERPL-MCNC: 0.68 MG/DL (ref 0.5–1.05)
EGFRCR SERPLBLD CKD-EPI 2021: >90 ML/MIN/1.73M*2
EOSINOPHIL # BLD AUTO: 0 X10*3/UL (ref 0–0.7)
EOSINOPHIL NFR BLD AUTO: 0 %
ERYTHROCYTE [DISTWIDTH] IN BLOOD BY AUTOMATED COUNT: 13.2 % (ref 11.5–14.5)
GLUCOSE SERPL-MCNC: 102 MG/DL (ref 74–99)
HCT VFR BLD AUTO: 28.7 % (ref 36–46)
HGB BLD-MCNC: 9.4 G/DL (ref 12–16)
IMM GRANULOCYTES # BLD AUTO: 0.03 X10*3/UL (ref 0–0.7)
IMM GRANULOCYTES NFR BLD AUTO: 0.4 % (ref 0–0.9)
LYMPHOCYTES # BLD AUTO: 1.1 X10*3/UL (ref 1.2–4.8)
LYMPHOCYTES NFR BLD AUTO: 14.7 %
MAGNESIUM SERPL-MCNC: 1.47 MG/DL (ref 1.6–2.4)
MCH RBC QN AUTO: 29.1 PG (ref 26–34)
MCHC RBC AUTO-ENTMCNC: 32.8 G/DL (ref 32–36)
MCV RBC AUTO: 89 FL (ref 80–100)
MONOCYTES # BLD AUTO: 0.34 X10*3/UL (ref 0.1–1)
MONOCYTES NFR BLD AUTO: 4.6 %
NEUTROPHILS # BLD AUTO: 6 X10*3/UL (ref 1.2–7.7)
NEUTROPHILS NFR BLD AUTO: 80.3 %
NRBC BLD-RTO: 0 /100 WBCS (ref 0–0)
PLATELET # BLD AUTO: 228 X10*3/UL (ref 150–450)
POTASSIUM SERPL-SCNC: 3.7 MMOL/L (ref 3.5–5.3)
PROT SERPL-MCNC: 5.6 G/DL (ref 6.4–8.2)
PTH-INTACT SERPL-MCNC: <6.3 PG/ML (ref 18.5–88)
RBC # BLD AUTO: 3.23 X10*6/UL (ref 4–5.2)
SODIUM SERPL-SCNC: 140 MMOL/L (ref 136–145)
WBC # BLD AUTO: 7.5 X10*3/UL (ref 4.4–11.3)

## 2024-12-29 PROCEDURE — 36415 COLL VENOUS BLD VENIPUNCTURE: CPT | Performed by: INTERNAL MEDICINE

## 2024-12-29 PROCEDURE — 2500000004 HC RX 250 GENERAL PHARMACY W/ HCPCS (ALT 636 FOR OP/ED): Performed by: PHYSICIAN ASSISTANT

## 2024-12-29 PROCEDURE — 2500000001 HC RX 250 WO HCPCS SELF ADMINISTERED DRUGS (ALT 637 FOR MEDICARE OP): Performed by: PHYSICIAN ASSISTANT

## 2024-12-29 PROCEDURE — 85025 COMPLETE CBC W/AUTO DIFF WBC: CPT | Performed by: INTERNAL MEDICINE

## 2024-12-29 PROCEDURE — 2060000001 HC INTERMEDIATE ICU ROOM DAILY

## 2024-12-29 PROCEDURE — 2500000004 HC RX 250 GENERAL PHARMACY W/ HCPCS (ALT 636 FOR OP/ED): Performed by: INTERNAL MEDICINE

## 2024-12-29 PROCEDURE — 83735 ASSAY OF MAGNESIUM: CPT | Performed by: INTERNAL MEDICINE

## 2024-12-29 PROCEDURE — 84075 ASSAY ALKALINE PHOSPHATASE: CPT | Performed by: INTERNAL MEDICINE

## 2024-12-29 PROCEDURE — 2500000001 HC RX 250 WO HCPCS SELF ADMINISTERED DRUGS (ALT 637 FOR MEDICARE OP): Performed by: INTERNAL MEDICINE

## 2024-12-29 RX ORDER — FLUDROCORTISONE ACETATE 0.1 MG/1
0.1 TABLET ORAL DAILY
Qty: 30 TABLET | Refills: 1 | Status: SHIPPED | OUTPATIENT
Start: 2024-12-29

## 2024-12-29 RX ORDER — CALCIUM CARBONATE 500(1250)
1250 TABLET ORAL
Qty: 90 TABLET | Refills: 1 | Status: SHIPPED | OUTPATIENT
Start: 2024-12-29

## 2024-12-29 RX ORDER — HYDROCORTISONE 5 MG/1
TABLET ORAL
Qty: 90 TABLET | Refills: 1 | Status: SHIPPED | OUTPATIENT
Start: 2024-12-29 | End: 2025-01-28

## 2024-12-29 RX ORDER — CALCITRIOL 0.25 UG/1
0.25 CAPSULE ORAL 2 TIMES DAILY
Qty: 60 CAPSULE | Refills: 1 | Status: SHIPPED | OUTPATIENT
Start: 2024-12-29

## 2024-12-29 RX ORDER — ACETAMINOPHEN 500 MG
5 TABLET ORAL NIGHTLY PRN
Qty: 30 TABLET | Refills: 1 | Status: SHIPPED | OUTPATIENT
Start: 2024-12-29

## 2024-12-29 RX ORDER — HYDROCORTISONE 5 MG/1
5 TABLET ORAL
Qty: 30 TABLET | Refills: 1 | Status: SHIPPED | OUTPATIENT
Start: 2024-12-29

## 2024-12-29 RX ORDER — DIVALPROEX SODIUM 500 MG/1
1000 TABLET, DELAYED RELEASE ORAL NIGHTLY
Qty: 60 TABLET | Refills: 1 | Status: SHIPPED | OUTPATIENT
Start: 2024-12-29

## 2024-12-29 RX ORDER — LANOLIN ALCOHOL/MO/W.PET/CERES
400 CREAM (GRAM) TOPICAL DAILY
Qty: 30 TABLET | Refills: 1 | Status: SHIPPED | OUTPATIENT
Start: 2024-12-29

## 2024-12-29 RX ORDER — MAGNESIUM SULFATE HEPTAHYDRATE 40 MG/ML
4 INJECTION, SOLUTION INTRAVENOUS ONCE
Status: COMPLETED | OUTPATIENT
Start: 2024-12-29 | End: 2024-12-29

## 2024-12-29 RX ORDER — LANOLIN ALCOHOL/MO/W.PET/CERES
400 CREAM (GRAM) TOPICAL DAILY
Status: DISCONTINUED | OUTPATIENT
Start: 2024-12-29 | End: 2025-01-01 | Stop reason: HOSPADM

## 2024-12-29 RX ORDER — ACETAMINOPHEN 325 MG/1
650 TABLET ORAL EVERY 4 HOURS PRN
Start: 2024-12-29

## 2024-12-29 RX ORDER — HYDROCORTISONE 10 MG/1
15 TABLET ORAL EVERY MORNING
Qty: 45 TABLET | Refills: 1 | Status: SHIPPED | OUTPATIENT
Start: 2024-12-29

## 2024-12-29 RX ORDER — DOCUSATE SODIUM 100 MG/1
100 CAPSULE, LIQUID FILLED ORAL 2 TIMES DAILY PRN
Qty: 60 CAPSULE | Refills: 1 | Status: SHIPPED | OUTPATIENT
Start: 2024-12-29

## 2024-12-29 RX ADMIN — FLUDROCORTISONE ACETATE 0.1 MG: 0.1 TABLET ORAL at 09:40

## 2024-12-29 RX ADMIN — CALCIUM 1250 MG: 500 TABLET ORAL at 09:40

## 2024-12-29 RX ADMIN — HYDROCORTISONE SODIUM SUCCINATE 25 MG: 100 INJECTION, POWDER, FOR SOLUTION INTRAMUSCULAR; INTRAVENOUS at 09:40

## 2024-12-29 RX ADMIN — HYDROCORTISONE SODIUM SUCCINATE 50 MG: 100 INJECTION, POWDER, FOR SOLUTION INTRAMUSCULAR; INTRAVENOUS at 02:01

## 2024-12-29 RX ADMIN — CALCIUM 1250 MG: 500 TABLET ORAL at 16:26

## 2024-12-29 RX ADMIN — SODIUM CHLORIDE, SODIUM LACTATE, POTASSIUM CHLORIDE, AND CALCIUM CHLORIDE 75 ML/HR: 600; 310; 30; 20 INJECTION, SOLUTION INTRAVENOUS at 09:49

## 2024-12-29 RX ADMIN — MAGNESIUM OXIDE TAB 400 MG (241.3 MG ELEMENTAL MG) 400 MG: 400 (241.3 MG) TAB at 16:26

## 2024-12-29 RX ADMIN — MAGNESIUM SULFATE HEPTAHYDRATE 4 G: 40 INJECTION, SOLUTION INTRAVENOUS at 16:27

## 2024-12-29 RX ADMIN — CALCITRIOL CAPSULES 0.25 MCG 0.25 MCG: 0.25 CAPSULE ORAL at 23:50

## 2024-12-29 RX ADMIN — CALCITRIOL CAPSULES 0.25 MCG 0.25 MCG: 0.25 CAPSULE ORAL at 09:40

## 2024-12-29 RX ADMIN — HYDROCORTISONE SODIUM SUCCINATE 25 MG: 100 INJECTION, POWDER, FOR SOLUTION INTRAMUSCULAR; INTRAVENOUS at 18:00

## 2024-12-29 ASSESSMENT — COGNITIVE AND FUNCTIONAL STATUS - GENERAL
DAILY ACTIVITIY SCORE: 24
DAILY ACTIVITIY SCORE: 24
MOBILITY SCORE: 24
MOBILITY SCORE: 24

## 2024-12-29 ASSESSMENT — PAIN SCALES - GENERAL
PAINLEVEL_OUTOF10: 0 - NO PAIN
PAINLEVEL_OUTOF10: 0 - NO PAIN

## 2024-12-29 NOTE — CARE PLAN
The patient's goals for the shift include      The clinical goals for the shift include patient will maintain comfort and safety and refrain from complications    Over the shift, the patient will maintain comfort and safety and refrain from further complications

## 2024-12-29 NOTE — CARE PLAN
Problem: Skin  Goal: Prevent/minimize sheer/friction injuries  Outcome: Progressing  Flowsheets (Taken 12/28/2024 1865)  Prevent/minimize sheer/friction injuries:   Turn/reposition every 2 hours/use positioning/transfer devices   HOB 30 degrees or less   Complete micro-shifts as needed if patient unable. Adjust patient position to relieve pressure points, not a full turn   The patient's goals for the shift include  rest    The clinical goals for the shift include patient will maintain comfort and safety and refrain from further complications

## 2024-12-29 NOTE — PROGRESS NOTES
"    Endocrinology Inpatient Consult Progress Note     PATIENT NAME: Kriss Malloy  MRN: [id number]  DATE: 12/29/2024    CONSULTING PHYSICIAN: Dr NAHUN Arciniega  REASON FOR CONSULT: AIPS      Interval Events     Seen this morning.  She was talking to herself in bed before I walked into her room.  Has no complaints.  She states that she feels much better.  She is happy her calcium was restarted.  She denies any perioral numbness.      Physical Examination     /66   Pulse 66   Temp 35.8 °C (96.4 °F) (Temporal)   Resp 18   Ht 1.702 m (5' 7\")   Wt 54 kg (119 lb)   SpO2 99%   BMI 18.64 kg/m²   No acute distress.  Appropriate affect.  Sometimes tangential.  Regular rate and rhythm.  Nonlabored respiration.  Soft nontender nondistended abdomen.  Negative Chovstek.      Medications     Reviewed MAR       Data     Recent Labs and Imaging Reviewed      Assessment / Plan        # Hypoparathyroidism  PTH is suppressed.  Vitamin D 25-hydroxy is replete.    Her serum calcium was on the lower side today, but the patient's albumin did decrease.  Query if this is delusional.    She only received 1 dose of her calcium carbonate yesterday evening.    For now we will continue her on calcitriol 0.25 mcg twice daily as well as calcium carbonate 1250 mg 3 times daily.    Continue to trend her chemistries.     # Adrenal Insufficiency:   Home Regimen: HC 15-10-5mg. Fludrocortisone 0.1mcg Daily.     Chemistries morning was reviewed.  No electrolyte abnormalities.  As such, I will decrease her glucocorticoids to 25 mg every 8 hours.  From my perspective she is okay for discharge.  She can go home on her home regimen of hydrocortisone as well as fludrocortisone as above.         Vince Sharma, DO  Endocrinology, Diabetes, and Metabolism    Available via [employer] Messenger    Please excuse any typographical or unwanted errors within this documentation as voice recognition software was used to dictate this note.   "

## 2024-12-29 NOTE — PROGRESS NOTES
Kriss Malloy is a 49 y.o. female on day 2 of admission presenting with Generalized weakness.      Subjective   Pt with PMHx including Melchor's Ds, Hypothyroid, Hypocalcemia, Schachenmann's Sx, and Schizophrenia presented to [facility] ED with Increased Weakness. Pt stated she has been without some Meds.        Objective     Last Recorded Vitals  VSS. AF  Intake/Output last 3 Shifts:        Admission Weight  Weight: 54.4 kg (120 lb) (12/27/24 0729)    Daily Weight  12/27/24 : 54 kg (119 lb)    Image Results  ECG 12 lead  Sinus rhythm  Prolonged QT interval    See ED provider note for full interpretation and clinical correlation  Confirmed by Leila Fuentes ([id number]) on 12/8/2024 5:06:36 PM  Electrocardiogram, 12-lead  Sinus rhythm  Borderline T abnormalities, anterior leads  Prolonged QT interval    See ED provider note for full interpretation and clinical correlation  Confirmed by Leila Fuentes ([id number]) on 12/8/2024 2:59:11 PM      Physical Exam  Gen - NAD, Seen walking [place] Talking to Self  ENT - NC  Neck - No JVD  H - S1S2  L - Decreased BS  Abd - Soft, ND  Ext - x 4, W & D  Neuro - Awake and Responsive, Talking to Self  Relevant Results    Scheduled medications  calcitriol, 0.25 mcg, oral, BID  calcium carbonate, 1,250 mg, oral, TID  fludrocortisone, 0.1 mg, oral, Daily  hydrocortisone sodium succinate, 25 mg, intravenous, q8h  magnesium sulfate, 4 g, intravenous, Once      Continuous medications  lactated Ringer's, 75 mL/hr, Last Rate: 75 mL/hr (12/29/24 0949)      PRN medications  PRN medications: acetaminophen **OR** acetaminophen **OR** acetaminophen, docusate sodium, melatonin, midodrine             Assessment/Plan   This patient currently has cardiac telemetry ordered; if you would like to modify or discontinue the telemetry order, click here to go to the orders activity to modify/discontinue the order.  Hypomagnesemia - Replace  Melchor's Ds/Adrenal  Insufficiency  Hypoparathyroid  Schizophrenia  Endocrine Appreciated  Continue Tx              Assessment & Plan  Generalized weakness                  Reji Arciniega, DO

## 2024-12-30 ENCOUNTER — APPOINTMENT (OUTPATIENT)
Dept: RADIOLOGY | Facility: HOSPITAL | Age: 49
End: 2024-12-30
Payer: COMMERCIAL

## 2024-12-30 VITALS
OXYGEN SATURATION: 96 % | TEMPERATURE: 98.4 F | RESPIRATION RATE: 18 BRPM | DIASTOLIC BLOOD PRESSURE: 64 MMHG | SYSTOLIC BLOOD PRESSURE: 115 MMHG | HEIGHT: 67 IN | WEIGHT: 119 LBS | HEART RATE: 65 BPM | BODY MASS INDEX: 18.68 KG/M2

## 2024-12-30 LAB
ALBUMIN SERPL BCP-MCNC: 3.1 G/DL (ref 3.4–5)
ALP SERPL-CCNC: 53 U/L (ref 33–110)
ALT SERPL W P-5'-P-CCNC: 9 U/L (ref 7–45)
ANION GAP SERPL CALC-SCNC: 10 MMOL/L (ref 10–20)
AST SERPL W P-5'-P-CCNC: 13 U/L (ref 9–39)
BASOPHILS # BLD AUTO: 0.01 X10*3/UL (ref 0–0.1)
BASOPHILS NFR BLD AUTO: 0.1 %
BILIRUB SERPL-MCNC: 0.2 MG/DL (ref 0–1.2)
BNP SERPL-MCNC: 66 PG/ML (ref 0–99)
BUN SERPL-MCNC: 17 MG/DL (ref 6–23)
CALCIUM SERPL-MCNC: 6.6 MG/DL (ref 8.6–10.3)
CHLORIDE SERPL-SCNC: 106 MMOL/L (ref 98–107)
CO2 SERPL-SCNC: 30 MMOL/L (ref 21–32)
CREAT SERPL-MCNC: 0.91 MG/DL (ref 0.5–1.05)
EGFRCR SERPLBLD CKD-EPI 2021: 78 ML/MIN/1.73M*2
EOSINOPHIL # BLD AUTO: 0.02 X10*3/UL (ref 0–0.7)
EOSINOPHIL NFR BLD AUTO: 0.2 %
ERYTHROCYTE [DISTWIDTH] IN BLOOD BY AUTOMATED COUNT: 13.2 % (ref 11.5–14.5)
GLUCOSE SERPL-MCNC: 75 MG/DL (ref 74–99)
HCT VFR BLD AUTO: 29.9 % (ref 36–46)
HGB BLD-MCNC: 9.9 G/DL (ref 12–16)
HOLD SPECIMEN: NORMAL
HOLD SPECIMEN: NORMAL
IMM GRANULOCYTES # BLD AUTO: 0.04 X10*3/UL (ref 0–0.7)
IMM GRANULOCYTES NFR BLD AUTO: 0.5 % (ref 0–0.9)
LYMPHOCYTES # BLD AUTO: 3.3 X10*3/UL (ref 1.2–4.8)
LYMPHOCYTES NFR BLD AUTO: 38.2 %
MAGNESIUM SERPL-MCNC: 1.58 MG/DL (ref 1.6–2.4)
MCH RBC QN AUTO: 29.6 PG (ref 26–34)
MCHC RBC AUTO-ENTMCNC: 33.1 G/DL (ref 32–36)
MCV RBC AUTO: 89 FL (ref 80–100)
MONOCYTES # BLD AUTO: 0.79 X10*3/UL (ref 0.1–1)
MONOCYTES NFR BLD AUTO: 9.1 %
NEUTROPHILS # BLD AUTO: 4.49 X10*3/UL (ref 1.2–7.7)
NEUTROPHILS NFR BLD AUTO: 51.9 %
NRBC BLD-RTO: 0 /100 WBCS (ref 0–0)
PLATELET # BLD AUTO: 233 X10*3/UL (ref 150–450)
POTASSIUM SERPL-SCNC: 3.4 MMOL/L (ref 3.5–5.3)
PROT SERPL-MCNC: 5.5 G/DL (ref 6.4–8.2)
RBC # BLD AUTO: 3.35 X10*6/UL (ref 4–5.2)
SODIUM SERPL-SCNC: 143 MMOL/L (ref 136–145)
WBC # BLD AUTO: 8.7 X10*3/UL (ref 4.4–11.3)

## 2024-12-30 PROCEDURE — 2500000001 HC RX 250 WO HCPCS SELF ADMINISTERED DRUGS (ALT 637 FOR MEDICARE OP): Performed by: INTERNAL MEDICINE

## 2024-12-30 PROCEDURE — 83735 ASSAY OF MAGNESIUM: CPT | Performed by: INTERNAL MEDICINE

## 2024-12-30 PROCEDURE — 1100000001 HC PRIVATE ROOM DAILY

## 2024-12-30 PROCEDURE — 71046 X-RAY EXAM CHEST 2 VIEWS: CPT | Performed by: RADIOLOGY

## 2024-12-30 PROCEDURE — 2500000004 HC RX 250 GENERAL PHARMACY W/ HCPCS (ALT 636 FOR OP/ED): Performed by: INTERNAL MEDICINE

## 2024-12-30 PROCEDURE — 71046 X-RAY EXAM CHEST 2 VIEWS: CPT

## 2024-12-30 PROCEDURE — 83880 ASSAY OF NATRIURETIC PEPTIDE: CPT | Performed by: INTERNAL MEDICINE

## 2024-12-30 PROCEDURE — 85025 COMPLETE CBC W/AUTO DIFF WBC: CPT | Performed by: INTERNAL MEDICINE

## 2024-12-30 PROCEDURE — 84075 ASSAY ALKALINE PHOSPHATASE: CPT | Performed by: INTERNAL MEDICINE

## 2024-12-30 PROCEDURE — 36415 COLL VENOUS BLD VENIPUNCTURE: CPT | Performed by: INTERNAL MEDICINE

## 2024-12-30 PROCEDURE — 2500000001 HC RX 250 WO HCPCS SELF ADMINISTERED DRUGS (ALT 637 FOR MEDICARE OP): Performed by: PHYSICIAN ASSISTANT

## 2024-12-30 RX ORDER — POTASSIUM CHLORIDE 1.5 G/1.58G
40 POWDER, FOR SOLUTION ORAL ONCE
Status: COMPLETED | OUTPATIENT
Start: 2024-12-30 | End: 2024-12-30

## 2024-12-30 RX ORDER — DIVALPROEX SODIUM 250 MG/1
500 TABLET, FILM COATED, EXTENDED RELEASE ORAL DAILY
Status: DISCONTINUED | OUTPATIENT
Start: 2024-12-30 | End: 2024-12-30

## 2024-12-30 RX ORDER — HYDROCORTISONE 10 MG/1
10 TABLET ORAL
Status: DISCONTINUED | OUTPATIENT
Start: 2024-12-30 | End: 2025-01-01 | Stop reason: HOSPADM

## 2024-12-30 RX ORDER — DIVALPROEX SODIUM 250 MG/1
1000 TABLET, FILM COATED, EXTENDED RELEASE ORAL NIGHTLY
Status: DISCONTINUED | OUTPATIENT
Start: 2024-12-30 | End: 2025-01-01 | Stop reason: HOSPADM

## 2024-12-30 RX ORDER — LANOLIN ALCOHOL/MO/W.PET/CERES
800 CREAM (GRAM) TOPICAL ONCE
Status: COMPLETED | OUTPATIENT
Start: 2024-12-30 | End: 2024-12-30

## 2024-12-30 RX ADMIN — FLUDROCORTISONE ACETATE 0.1 MG: 0.1 TABLET ORAL at 11:07

## 2024-12-30 RX ADMIN — CALCIUM 1250 MG: 500 TABLET ORAL at 11:07

## 2024-12-30 RX ADMIN — HYDROCORTISONE 15 MG: 5 TABLET ORAL at 11:13

## 2024-12-30 RX ADMIN — MAGNESIUM OXIDE TAB 400 MG (241.3 MG ELEMENTAL MG) 800 MG: 400 (241.3 MG) TAB at 11:11

## 2024-12-30 RX ADMIN — DIVALPROEX SODIUM 500 MG: 250 TABLET, EXTENDED RELEASE ORAL at 21:11

## 2024-12-30 RX ADMIN — MAGNESIUM OXIDE TAB 400 MG (241.3 MG ELEMENTAL MG) 400 MG: 400 (241.3 MG) TAB at 11:07

## 2024-12-30 RX ADMIN — CALCITRIOL CAPSULES 0.25 MCG 0.25 MCG: 0.25 CAPSULE ORAL at 21:11

## 2024-12-30 RX ADMIN — POTASSIUM CHLORIDE 40 MEQ: 1.5 POWDER, FOR SOLUTION ORAL at 11:11

## 2024-12-30 ASSESSMENT — COGNITIVE AND FUNCTIONAL STATUS - GENERAL
MOBILITY SCORE: 24
DAILY ACTIVITIY SCORE: 24
DAILY ACTIVITIY SCORE: 24
MOBILITY SCORE: 24

## 2024-12-30 ASSESSMENT — PAIN SCALES - GENERAL
PAINLEVEL_OUTOF10: 0 - NO PAIN

## 2024-12-30 ASSESSMENT — PAIN - FUNCTIONAL ASSESSMENT: PAIN_FUNCTIONAL_ASSESSMENT: 0-10

## 2024-12-30 NOTE — PROGRESS NOTES
Kriss Malloy is a 49 y.o. female on day 2 of admission presenting with Generalized weakness.      Subjective   No new events. Pt still seen Mumbling/Talking to self.       Objective     Last Recorded Vitals  /71 (BP Location: Right arm, Patient Position: Lying)   Pulse 65   Temp 35.7 °C (96.3 °F) (Temporal)   Resp 18   Wt 54 kg (119 lb)   SpO2 98%   Intake/Output last 3 Shifts:    Intake/Output Summary (Last 24 hours) at 12/29/2024 2228  Last data filed at 12/29/2024 0613  Gross per 24 hour   Intake 1025.42 ml   Output --   Net 1025.42 ml       Admission Weight  Weight: 54.4 kg (120 lb) (12/27/24 0729)    Daily Weight  12/27/24 : 54 kg (119 lb)    Image Results  ECG 12 lead  Sinus rhythm  Prolonged QT interval    See ED provider note for full interpretation and clinical correlation  Confirmed by Leila Fuentes ([id number]) on 12/8/2024 5:06:36 PM  Electrocardiogram, 12-lead  Sinus rhythm  Borderline T abnormalities, anterior leads  Prolonged QT interval    See ED provider note for full interpretation and clinical correlation  Confirmed by Leila Fuentes ([id number]) on 12/8/2024 2:59:11 PM      Physical Exam  Gen - NAD  ENT - NC  Neck - No JVD  H - S1S2  L - Decreased BS  Abd - Soft, ND  Ext - x 4, W & D  Neuro - Awake and Responsive  Relevant Results    Scheduled medications  calcitriol, 0.25 mcg, oral, BID  calcium carbonate, 1,250 mg, oral, TID  fludrocortisone, 0.1 mg, oral, Daily  hydrocortisone sodium succinate, 25 mg, intravenous, q8h  magnesium oxide, 400 mg, oral, Daily      Continuous medications     PRN medications  PRN medications: acetaminophen **OR** acetaminophen **OR** acetaminophen, docusate sodium, melatonin, midodrine  Results for orders placed or performed during the hospital encounter of 12/27/24 (from the past 24 hours)   Comprehensive metabolic panel   Result Value Ref Range    Glucose 102 (H) 74 - 99 mg/dL    Sodium 140 136 - 145 mmol/L    Potassium 3.7 3.5 - 5.3 mmol/L     Chloride 103 98 - 107 mmol/L    Bicarbonate 26 21 - 32 mmol/L    Anion Gap 15 10 - 20 mmol/L    Urea Nitrogen 13 6 - 23 mg/dL    Creatinine 0.68 0.50 - 1.05 mg/dL    eGFR >90 >60 mL/min/1.73m*2    Calcium 6.5 (L) 8.6 - 10.3 mg/dL    Albumin 3.1 (L) 3.4 - 5.0 g/dL    Alkaline Phosphatase 51 33 - 110 U/L    Total Protein 5.6 (L) 6.4 - 8.2 g/dL    AST 14 9 - 39 U/L    Bilirubin, Total 0.2 0.0 - 1.2 mg/dL    ALT 9 7 - 45 U/L   CBC and Auto Differential   Result Value Ref Range    WBC 7.5 4.4 - 11.3 x10*3/uL    nRBC 0.0 0.0 - 0.0 /100 WBCs    RBC 3.23 (L) 4.00 - 5.20 x10*6/uL    Hemoglobin 9.4 (L) 12.0 - 16.0 g/dL    Hematocrit 28.7 (L) 36.0 - 46.0 %    MCV 89 80 - 100 fL    MCH 29.1 26.0 - 34.0 pg    MCHC 32.8 32.0 - 36.0 g/dL    RDW 13.2 11.5 - 14.5 %    Platelets 228 150 - 450 x10*3/uL    Neutrophils % 80.3 40.0 - 80.0 %    Immature Granulocytes %, Automated 0.4 0.0 - 0.9 %    Lymphocytes % 14.7 13.0 - 44.0 %    Monocytes % 4.6 2.0 - 10.0 %    Eosinophils % 0.0 0.0 - 6.0 %    Basophils % 0.0 0.0 - 2.0 %    Neutrophils Absolute 6.00 1.20 - 7.70 x10*3/uL    Immature Granulocytes Absolute, Automated 0.03 0.00 - 0.70 x10*3/uL    Lymphocytes Absolute 1.10 (L) 1.20 - 4.80 x10*3/uL    Monocytes Absolute 0.34 0.10 - 1.00 x10*3/uL    Eosinophils Absolute 0.00 0.00 - 0.70 x10*3/uL    Basophils Absolute 0.00 0.00 - 0.10 x10*3/uL   Magnesium   Result Value Ref Range    Magnesium 1.47 (L) 1.60 - 2.40 mg/dL                Assessment/Plan   This patient currently has cardiac telemetry ordered; if you would like to modify or discontinue the telemetry order, click here to go to the orders activity to modify/discontinue the order.  Hypomagnesemia - Replace  Republic's Ds/Adrenal Insufficiency - ? Compliance with Home Meds ?  Hypoparathyroid  Schizophrenia  Pt Improved - Pt Discharged but States she can not go home tonight - Discharge Tomorrow  Endocrine Appreciated  Continue Tx            Assessment & Plan                  Reji GOSS  Demian, DO

## 2024-12-30 NOTE — PROGRESS NOTES
This MSW requested by [facility] to follow up with patient as she is in need of transportation to medical appointments and a phone. Patient has [employer]. This MSW collaborated with [facility] prior to visiting with patient.  10:30: This MSW met with patient who was observed walking around room and appeared to be talking to self out loud. Patient agreed to meet with this MSW. She provided her name and  for patient identifiers. She confirmed she has [employer] health insurance but denied having a "[employer] insurance card. She was in agreement with this MSW providing her with the contact number for [facility] so she can follow up on getting transportation to medical appointments and a phone. Patient denied any additional needs at this time.   11:07: This MSW followed back up with patient and [facility] so that patient could follow up on medical transportation and obtaining a phone. No additional needs identified at this time. This MSW to follow up as/if needed.

## 2024-12-30 NOTE — PROGRESS NOTES
"    Endocrinology Inpatient Consult Progress Note     PATIENT NAME: Kriss Malloy  MRN: [id number]  DATE: 12/30/2024    CONSULTING PHYSICIAN: Dr NAHUN Arciniega  REASON FOR CONSULT: AIPS      Interval Events     Seen this morning.  She was talking to herself and ambulating in her room.  She states that she was.  He got.  Patient reports that she has shortness of breath with deep inspiration.  She denies any chest pain.  She wants me to order an MRI of her chest.  Denies any nausea or vomiting.      Physical Examination     /79   Pulse 80   Temp 36.2 °C (97.2 °F)   Resp 16   Ht 1.702 m (5' 7\")   Wt 54 kg (119 lb)   SpO2 98%   BMI 18.64 kg/m²   No acute distress.  Appropriate affect.  Sometimes tangential.  Regular rate and rhythm.  Nonlabored respiration.  Soft nontender nondistended abdomen.  Negative Chovstek.      Medications     Reviewed MAR       Data     Recent Labs and Imaging Reviewed      Assessment / Plan        # Hypoparathyroidism  PTH is suppressed.  Vitamin D 25-hydroxy is replete.    Her serum calcium was again low today.  The patient did not get her evening dose of calcium.  Apparently the patient refused this.  I counseled her.    She only received 1 dose of her calcium carbonate yesterday evening  For now we will continue her on calcitriol 0.25 mcg twice daily as well as calcium carbonate 1250 mg 3 times daily.    Continue to trend her chemistries.     # Adrenal Insufficiency:   Home Regimen: HC 15-10-5mg. Fludrocortisone 0.1mcg Daily.     Lost her IV.  Did not receive her dose of hydrocortisone yesterday evening.  I will transition her to an oral regimen.  The patient is slated for discharge.      Patient did complain of dyspnea as well as discomfort in her chest.  I did see the patient's RN.  She will reach out to Dr. Arciniega.         Vince Sharma, DO  Endocrinology, Diabetes, and Metabolism    Available via EP[employer] Messenger    Please excuse any typographical or unwanted errors within this " documentation as voice recognition software was used to dictate this note.

## 2024-12-30 NOTE — PROGRESS NOTES
Physical Therapy                 Therapy Communication Note    Patient Name: Kriss Malloy  MRN: [id number]  Department: [facility]  Room: [address] [place], [place] [address]  Today's Date: 12/30/2024     Discipline: Physical Therapy    PT Missed Visit:  (Pt observed ambulating around unit while talking to herself (psych hx) but demo steady, reciprocal gait w/o AD need and no overt LOB. Per RN and charting pt has been routinely walking laps in hallway. Anticipate no PT needs. Pt is D/C from PT)

## 2024-12-30 NOTE — CARE PLAN
The patient's goals for the shift include      The clinical goals for the shift include patient will maintain comfort and safety and refrain from further complications    Over the shift, the patientwill maintain comfort and safety and refrain from complications

## 2024-12-30 NOTE — PROGRESS NOTES
12/30/24 1032   Discharge Planning   Living Arrangements Family members  (Sister)   Support Systems Family members   Assistance Needed independent   Type of Residence Private residence   Home or Post Acute Services None   Expected Discharge Disposition Home   Intensity of Service   Intensity of Service 0-30 min     Care transitions at bedside for assessmrnt.  TCC introduced self and explained role.  Demographics verified.  Patient stated that she lives with sister.  Stated that she does not have a phone or a car to get to appointments.  SW updated regarding patient needs.  Pt plans to return home at discharge.  Care transitions to follow.      Pharmacy: Pt prefers meds to beds at discharge  Insurance: [employer]    10:39 am: Pt provided with primary care list

## 2024-12-31 LAB
AMPHETAMINES UR QL SCN: NORMAL
ATRIAL RATE: 95 BPM
BARBITURATES UR QL SCN: NORMAL
BENZODIAZ UR QL SCN: NORMAL
BZE UR QL SCN: NORMAL
CANNABINOIDS UR QL SCN: NORMAL
FENTANYL+NORFENTANYL UR QL SCN: NORMAL
METHADONE UR QL SCN: NORMAL
OPIATES UR QL SCN: NORMAL
OXYCODONE+OXYMORPHONE UR QL SCN: NORMAL
P AXIS: 76 DEGREES
P OFFSET: 195 MS
P ONSET: 150 MS
PCP UR QL SCN: NORMAL
PR INTERVAL: 144 MS
Q ONSET: 222 MS
QRS COUNT: 16 BEATS
QRS DURATION: 76 MS
QT INTERVAL: 374 MS
QTC CALCULATION(BAZETT): 469 MS
QTC FREDERICIA: 435 MS
R AXIS: 85 DEGREES
T AXIS: 59 DEGREES
T OFFSET: 409 MS
VENTRICULAR RATE: 95 BPM

## 2024-12-31 PROCEDURE — 2500000001 HC RX 250 WO HCPCS SELF ADMINISTERED DRUGS (ALT 637 FOR MEDICARE OP): Performed by: INTERNAL MEDICINE

## 2024-12-31 PROCEDURE — 2500000002 HC RX 250 W HCPCS SELF ADMINISTERED DRUGS (ALT 637 FOR MEDICARE OP, ALT 636 FOR OP/ED): Performed by: INTERNAL MEDICINE

## 2024-12-31 PROCEDURE — 99255 IP/OBS CONSLTJ NEW/EST HI 80: CPT | Performed by: NURSE PRACTITIONER

## 2024-12-31 PROCEDURE — 80307 DRUG TEST PRSMV CHEM ANLYZR: CPT | Performed by: INTERNAL MEDICINE

## 2024-12-31 PROCEDURE — 2500000001 HC RX 250 WO HCPCS SELF ADMINISTERED DRUGS (ALT 637 FOR MEDICARE OP): Performed by: PHYSICIAN ASSISTANT

## 2024-12-31 PROCEDURE — 2500000004 HC RX 250 GENERAL PHARMACY W/ HCPCS (ALT 636 FOR OP/ED): Performed by: INTERNAL MEDICINE

## 2024-12-31 PROCEDURE — 1100000001 HC PRIVATE ROOM DAILY

## 2024-12-31 PROCEDURE — 36415 COLL VENOUS BLD VENIPUNCTURE: CPT | Performed by: INTERNAL MEDICINE

## 2024-12-31 RX ORDER — QUETIAPINE FUMARATE 25 MG/1
25 TABLET, FILM COATED ORAL NIGHTLY
Status: DISCONTINUED | OUTPATIENT
Start: 2024-12-31 | End: 2025-01-01 | Stop reason: HOSPADM

## 2024-12-31 RX ORDER — LORAZEPAM 0.5 MG/1
0.5 TABLET ORAL EVERY 8 HOURS PRN
Status: DISCONTINUED | OUTPATIENT
Start: 2024-12-31 | End: 2025-01-01 | Stop reason: HOSPADM

## 2024-12-31 RX ADMIN — HYDROCORTISONE 15 MG: 5 TABLET ORAL at 09:38

## 2024-12-31 RX ADMIN — MAGNESIUM OXIDE TAB 400 MG (241.3 MG ELEMENTAL MG) 400 MG: 400 (241.3 MG) TAB at 09:39

## 2024-12-31 RX ADMIN — CALCIUM 1250 MG: 500 TABLET ORAL at 17:18

## 2024-12-31 RX ADMIN — CALCITRIOL CAPSULES 0.25 MCG 0.25 MCG: 0.25 CAPSULE ORAL at 09:38

## 2024-12-31 RX ADMIN — LORAZEPAM 0.5 MG: 0.5 TABLET ORAL at 14:07

## 2024-12-31 ASSESSMENT — PAIN SCALES - GENERAL
PAINLEVEL_OUTOF10: 0 - NO PAIN
PAINLEVEL_OUTOF10: 0 - NO PAIN

## 2024-12-31 ASSESSMENT — PAIN - FUNCTIONAL ASSESSMENT: PAIN_FUNCTIONAL_ASSESSMENT: 0-10

## 2024-12-31 NOTE — CARE PLAN
The patient's goals for the shift include      The clinical goals for the shift include patient will maintain comfort and safety and refrain from further complications      Problem: Skin  Goal: Decreased wound size/increased tissue granulation at next dressing change  Outcome: Progressing  Flowsheets (Taken 12/30/2024 2133)  Decreased wound size/increased tissue granulation at next dressing change: Promote sleep for wound healing  Goal: Participates in plan/prevention/treatment measures  Outcome: Progressing  Goal: Prevent/manage excess moisture  Outcome: Progressing  Goal: Prevent/minimize sheer/friction injuries  Outcome: Progressing  Goal: Promote/optimize nutrition  Outcome: Progressing  Goal: Promote skin healing  Outcome: Progressing

## 2024-12-31 NOTE — PROGRESS NOTES
Kriss Malloy is a 49 y.o. female on day 3 of admission presenting with Generalized weakness.      Subjective   Pt Discharged after placed on Home Meds and Doses - But Pt Complained of SOB (While Oxygenating Fine and Walking around [place]) and Chest Discomfort but also refused New IV and CT Chest and Labs to be drawn. Family also unreachable until late in the day for Transport and Family said she needed Treatment for Bremer's - which Pt has been.       Objective     Last Recorded Vitals  /87 (BP Location: Right arm, Patient Position: Sitting)   Pulse 67   Temp 35.7 °C (96.3 °F) (Temporal)   Resp 18   Wt 54 kg (119 lb)   SpO2 98%   Intake/Output last 3 Shifts:  No intake or output data in the 24 hours ending 12/30/24 2350    Admission Weight  Weight: 54.4 kg (120 lb) (12/27/24 0729)    Daily Weight  12/27/24 : 54 kg (119 lb)    Image Results  ECG 12 lead  Normal sinus rhythm  Normal ECG  When compared with ECG of 29-NOV-2024 23:48,  PREVIOUS ECG IS PRESENT  XR chest 2 views  Narrative: Interpreted By:  Troy Sutton,   STUDY:  XR CHEST 2 VIEWS;  12/30/2024 1:06 pm      INDICATION:  Signs/Symptoms:shortness of breath.          COMPARISON:  12/28/2024      ACCESSION NUMBER(S):  [id number]      ORDERING CLINICIAN:  ROHAN JUSTICE      FINDINGS:  Two view chest      Trachea nondisplaced. Heart size stable. No definite new confluent  pulmonary infiltrates or sizeable effusions. Probable biapical  pleural-parenchymal scarring similar to prior.          Impression: 1.  Stable radiographic appearance of the chest without definite  acute findings.              MACRO:  None      Signed by: Troy Sutton 12/30/2024 2:14 PM  Dictation workstation:   [id number]  XR chest 2 views  Narrative: Interpreted By:  Dimas Raymond,   STUDY:  XR CHEST 2 VIEWS;  12/28/2024 9:12 am      INDICATION:  Signs/Symptoms:SOB.      COMPARISON:  09/22/2024      ACCESSION NUMBER(S):  [id number]      ORDERING CLINICIAN:  ROHAN  VINH      FINDINGS:  CARDIOMEDIASTINAL SILHOUETTE AND VASCULATURE:      Cardiac size:  Within normal limits.  Aortic shadow:  Within normal limits.      Mediastinal contours: Within normal limits.      Pulmonary vasculature:  The central vasculature is unremarkable      LUNGS:  Lungs are clear.      ABDOMEN AND OTHER FINDINGS:  No remarkable upper abdominal findings.      BONES:  No acute osseous changes.      Impression: 1.  No active cardiopulmonary disease.  There has not been  significant interval change from the prior exam.      Signed by: Dimas Raymond 12/30/2024 8:19 AM  Dictation workstation:   [id number]      Physical Exam  Gen - NAD  ENT - NC  Neck - No JVD  H - S1S2  L - Decreased BS  Abd - Soft, ND  Ext - x 4, W & D  Neuro - Awake and Responsive  Relevant Results    Scheduled medications  calcitriol, 0.25 mcg, oral, BID  calcium carbonate, 1,250 mg, oral, TID  divalproex, 1,000 mg, oral, Nightly  fludrocortisone, 0.1 mg, oral, Daily  hydrocortisone, 10 mg, oral, Daily  hydrocortisone, 15 mg, oral, Daily  magnesium oxide, 400 mg, oral, Daily      Continuous medications     PRN medications  PRN medications: acetaminophen **OR** acetaminophen **OR** acetaminophen, docusate sodium, melatonin, midodrine  Results for orders placed or performed during the hospital encounter of 12/27/24 (from the past 24 hours)   Red Top   Result Value Ref Range    Extra Tube Hold for add-ons.    SST TOP   Result Value Ref Range    Extra Tube Hold for add-ons.    Comprehensive metabolic panel   Result Value Ref Range    Glucose 75 74 - 99 mg/dL    Sodium 143 136 - 145 mmol/L    Potassium 3.4 (L) 3.5 - 5.3 mmol/L    Chloride 106 98 - 107 mmol/L    Bicarbonate 30 21 - 32 mmol/L    Anion Gap 10 10 - 20 mmol/L    Urea Nitrogen 17 6 - 23 mg/dL    Creatinine 0.91 0.50 - 1.05 mg/dL    eGFR 78 >60 mL/min/1.73m*2    Calcium 6.6 (L) 8.6 - 10.3 mg/dL    Albumin 3.1 (L) 3.4 - 5.0 g/dL    Alkaline Phosphatase 53 33 - 110 U/L    Total  Protein 5.5 (L) 6.4 - 8.2 g/dL    AST 13 9 - 39 U/L    Bilirubin, Total 0.2 0.0 - 1.2 mg/dL    ALT 9 7 - 45 U/L   CBC and Auto Differential   Result Value Ref Range    WBC 8.7 4.4 - 11.3 x10*3/uL    nRBC 0.0 0.0 - 0.0 /100 WBCs    RBC 3.35 (L) 4.00 - 5.20 x10*6/uL    Hemoglobin 9.9 (L) 12.0 - 16.0 g/dL    Hematocrit 29.9 (L) 36.0 - 46.0 %    MCV 89 80 - 100 fL    MCH 29.6 26.0 - 34.0 pg    MCHC 33.1 32.0 - 36.0 g/dL    RDW 13.2 11.5 - 14.5 %    Platelets 233 150 - 450 x10*3/uL    Neutrophils % 51.9 40.0 - 80.0 %    Immature Granulocytes %, Automated 0.5 0.0 - 0.9 %    Lymphocytes % 38.2 13.0 - 44.0 %    Monocytes % 9.1 2.0 - 10.0 %    Eosinophils % 0.2 0.0 - 6.0 %    Basophils % 0.1 0.0 - 2.0 %    Neutrophils Absolute 4.49 1.20 - 7.70 x10*3/uL    Immature Granulocytes Absolute, Automated 0.04 0.00 - 0.70 x10*3/uL    Lymphocytes Absolute 3.30 1.20 - 4.80 x10*3/uL    Monocytes Absolute 0.79 0.10 - 1.00 x10*3/uL    Eosinophils Absolute 0.02 0.00 - 0.70 x10*3/uL    Basophils Absolute 0.01 0.00 - 0.10 x10*3/uL   Magnesium   Result Value Ref Range    Magnesium 1.58 (L) 1.60 - 2.40 mg/dL   B-type natriuretic peptide   Result Value Ref Range    BNP 66 0 - 99 pg/mL                Assessment/Plan      Hypomagnesemia - Replace  Harlan's Ds/Adrenal Insufficiency - ? Compliance ? - Treated and Meds Restarted  Hypoparathyroid  CP/SOB - O2 Sats Normal - CXR Normal - Workup limited with Pt Refusing IV and Labs - Check Labs and EKG in AM  Schizophrenia - Depakote Restarted - Consult Psych  Endocrine Appreciated  Pt Medically Stable for Discharge  ? Home vs Psych Admission ?            Assessment & Plan                  Reji Arciniega, DO

## 2024-12-31 NOTE — NURSING NOTE
Pt arrived to 207 at this time. Pt refusing vital signs. Pt oriented x 4, no iv access. Pt experiencing auditory hallucinations at this time.

## 2024-12-31 NOTE — PROGRESS NOTES
Occupational Therapy                 Therapy Communication Note    Patient Name: Kriss Malloy  MRN: [id number]  Department: [facility]  Room: 207/207-A  Today's Date: 12/31/2024     Discipline: Occupational Therapy    OT Missed Visit: Yes     Missed Visit Reason:  Observed patient ambulating ind.'ly in room without device, talking to herself. Per RN, pt. is ind. with adls including toileting. No skilled OT needs in acute setting. Discontinue OT services.

## 2024-12-31 NOTE — Clinical Note
Patient referred to this  this evening to assist with possible dc planning. 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 States when she is like this it is usual the melchor's. She wants treatment for the melchor's and has been off her melchor's medications for three weeks. There does not appear to be anything going on at home per the sister.

## 2024-12-31 NOTE — PROGRESS NOTES
"    Endocrinology Inpatient Consult Progress Note     PATIENT NAME: Kriss Malloy  MRN: [id number]  DATE: 12/31/2024    CONSULTING PHYSICIAN: Dr NAHUN Arciniega  REASON FOR CONSULT: AIPS      Interval Events     Transferred to the second floor.  The patient reportedly was seen by psychiatry.  She is going to be involuntarily admitted to an inpatient psychiatry unit.  Again, prior to me entering her room, the patient was talking to herself.  She reports that she has refused the calcium.  She states that she will not do so anymore.  She denies any perioral numbness.      Physical Examination     /87 (BP Location: Right arm, Patient Position: Sitting)   Pulse 67   Temp 35.7 °C (96.3 °F) (Temporal)   Resp 18   Ht 1.702 m (5' 7\")   Wt 54 kg (119 lb)   SpO2 98%   BMI 18.64 kg/m²   No acute distress.  Appropriate affect.  Sometimes tangential.  Regular rate and rhythm.  Nonlabored respiration.  Soft nontender nondistended abdomen.  Negative Chovstek.      Medications     Reviewed MAR       Data     Recent Labs and Imaging Reviewed      Assessment / Plan        # Hypoparathyroidism  No chemistry this morning.    Calcium has remained low.  I would like her to calcium carbonate around mealtime for better absorption.  Ideally I think should be on calcium citrate but that is not available at the hospital.    If her calcium still is not within the normal limits, we perhaps have to increase her calcitriol.  Ideally we would obtain urine calcium to determine this.  Nonetheless it is okay for her calcium to be on the lower limit of normal to help prevent any kidney stone formation.    Nonetheless, she is asymptomatic (i.e. negative chopsticks.  No perioral numbness).     # Adrenal Insufficiency:   Home Regimen: HC 15-10-5mg. Fludrocortisone 0.1mcg Daily.     Transition to her oral hydrocortisone dose.  She did refuse her fludrocortisone.  She refused her evening dose of hydrocortisone.    I counseled the patient that she " should not be refusing her calcium and/or her hydrocortisone as this could be fatal.  The patient understood.  She states that she would do better with compliance.  Query if the patient has capacity to even refuse medications given that she is going to be involuntarily admitted.    Not receiving her calcium and/or her glucocorticoids could in fact be fatal.         Vince Sharma, DO  Endocrinology, Diabetes, and Metabolism    Available via [employer] Messenger    Please excuse any typographical or unwanted errors within this documentation as voice recognition software was used to dictate this note.

## 2024-12-31 NOTE — CONSULTS
"Psych Consult      Consult Requested By: Reji Arciniega    Reason for Consultation:  schizophrenia    HISTORY OF PRESENT ILLNESS    Per ED - Kriss Malloy is a 49 y.o. 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 Pt is guarded during my interview but is calm and cooperative. She repeatedly states she is \"here for my Meadow Vista's only.\" She cannot quantify exactly how long she has been feeling this way. Per ER report, the pt reported she has missed some of her medications but denies this to me and the pharmacy tech. She is not taking Calcitriol and is out of it but is willing to take it. She also appears to be out of Florinef but reports she has donovan taking it. She reports compliance with her Cortef. She denies fever, chills, headache, dizziness, chest pain, abdominal pain, dysuria, nausea, vomiting, diarrhea, or constipation. She has been prescribed Depakote and Seroquel but is not taking those because she does not need them. She denies SI or HI. She states she is not overly anxious or depressed but feels down some because she is so weak. She denies auditory or visual hallucinations. When I ask her how she communicates with her physicians, she says she cannot because she does not have a phone or car. She denies having a PCP but is open to having one. She is living with her sister but does not know how long that will last. ER evaluation today revealed hypotension, hypocalcemia, and hypomagnesemia. She was initially borderline hypoglycemic but this has resolved. Pt was recommended for admission and accepted on the service of Dr. Arciniega with consultation to endocrinology. [facility], social work, and  [facility] Referral placed placed for discharge planning, establishing PCP, and " "assisting with resources so she can communicate with her medical professionals.         HPI:  Pt seen in her room this morning  Pt is pacing, praying and talking to self. +internally stimulated  She is able to answer some questions briefly    Pt states that she is living with her sister Ina [phone] currently in [place]. Does not have any children. Not currently working. Denies that she is on disability at this time. Denies any drug or etoh use.    Pt denies that she is currently linked with psychiatric provider in the community or any other health care professional at this time.  Not taking medication.  Pt states \"I know this (Melchor's disease flare up) will keep happening but I don't see any doctors. God bless me\".     Pt seen pacing in room +psychomotor agitation  Staff reports pt not sleeping  Pt is religiously preoccupied  +AH    No acute agitation noted  Denies SI \"I would never do anything to hurt myself\"    Hx of schizophrenia, non compliance with medication and several admissions to inpatient psychiatry.  Attempted to reach sister, DIANA left    PSYCHIATRIC REVIEW OF SYSTEMS  SI: Denies    Depression: Denies    Sherrie: Denies    Panic: Denies    Generalized Anxiety: Denies    PTSD: Denies    OCD: Denies    Psychosis: Auditory Hallucinations and Paranoid Thoughts     Eating Disorder: Denies    Current Outpatient Medications   Medication Instructions    acetaminophen (TYLENOL) 650 mg, oral, Every 4 hours PRN    calcitriol (Rocaltrol) 0.25 mcg capsule TAKE 2 CAPSULES BY MOUTH ONCE A DAY    calcium carbonate (OSCAL) 1,250 mg, oral, 3 times daily (morning, midday, late afternoon)    divalproex (DEPAKOTE) 1,000 mg, oral, Nightly, Do not crush, chew, or split.    docusate sodium (COLACE) 100 mg, oral, 2 times daily PRN    fludrocortisone (FLORINEF) 0.1 mg, oral, Daily    hydrocortisone (Cortef) 5 mg tablet Take 2 tablets (10 mg) by mouth once daily at noon. Take with meals AND 1 tablet (5 mg) once daily in " the evening    hydrocortisone (CORTEF) 5 mg, oral, Daily with evening meal    hydrocortisone (CORTEF) 15 mg, oral, Every morning    magnesium oxide (MAG-OX) 400 mg, oral, Daily    melatonin 5 mg, oral, Nightly PRN    QUEtiapine (SEROquel) 25 mg tablet TAKE 1 TABLET BY MOUTH ONCE DAILY AT BEDTIME        OARRS:   310    History reviewed. No pertinent past medical history.     History reviewed. No pertinent surgical history.     No family history on file.     Social History     Substance and Sexual Activity   Alcohol Use Not Currently        Social History     Substance and Sexual Activity   Drug Use Not Currently        Social History     Tobacco Use   Smoking Status Former    Types: Cigarettes   Smokeless Tobacco Not on file   Tobacco Comments    Smoked as a teenager        MENTAL STATUS EXAM  Physical Exam  Psychiatric:         Attention and Perception: She is inattentive. She perceives auditory hallucinations.         Mood and Affect: Affect is flat.         Speech: Speech normal.         Behavior: Behavior is cooperative.         Thought Content: Thought content is paranoid.         Cognition and Memory: Cognition and memory normal.         Judgment: Judgment is inappropriate.         Vitals:    12/30/24 0559 12/30/24 0726 12/30/24 1120 12/30/24 1530   BP: 131/67 106/79 111/62 115/87   BP Location:  Right arm Right arm Right arm   Patient Position:  Sitting Sitting Sitting   Pulse: 80 70 75 67   Resp:  16 18 18   Temp: 36.6 °C (97.9 °F) 36.2 °C (97.2 °F) 36 °C (96.8 °F) 35.7 °C (96.3 °F)   TempSrc:  Temporal Temporal Temporal   SpO2: 99% 98% 100% 98%   Weight:       Height:            Recent Results (from the past 72 hours)   PTH, Intact    Collection Time: 12/28/24 12:53 PM   Result Value Ref Range    Parathyroid Hormone, Intact <6.3 (L) 18.5 - 88.0 pg/mL   Vitamin D 25-Hydroxy,Total (for eval of Vitamin D levels)    Collection Time: 12/28/24 12:53 PM   Result Value Ref Range    Vitamin D, 25-Hydroxy, Total 35 30  - 100 ng/mL   Comprehensive metabolic panel    Collection Time: 12/29/24  5:22 AM   Result Value Ref Range    Glucose 102 (H) 74 - 99 mg/dL    Sodium 140 136 - 145 mmol/L    Potassium 3.7 3.5 - 5.3 mmol/L    Chloride 103 98 - 107 mmol/L    Bicarbonate 26 21 - 32 mmol/L    Anion Gap 15 10 - 20 mmol/L    Urea Nitrogen 13 6 - 23 mg/dL    Creatinine 0.68 0.50 - 1.05 mg/dL    eGFR >90 >60 mL/min/1.73m*2    Calcium 6.5 (L) 8.6 - 10.3 mg/dL    Albumin 3.1 (L) 3.4 - 5.0 g/dL    Alkaline Phosphatase 51 33 - 110 U/L    Total Protein 5.6 (L) 6.4 - 8.2 g/dL    AST 14 9 - 39 U/L    Bilirubin, Total 0.2 0.0 - 1.2 mg/dL    ALT 9 7 - 45 U/L   CBC and Auto Differential    Collection Time: 12/29/24  5:22 AM   Result Value Ref Range    WBC 7.5 4.4 - 11.3 x10*3/uL    nRBC 0.0 0.0 - 0.0 /100 WBCs    RBC 3.23 (L) 4.00 - 5.20 x10*6/uL    Hemoglobin 9.4 (L) 12.0 - 16.0 g/dL    Hematocrit 28.7 (L) 36.0 - 46.0 %    MCV 89 80 - 100 fL    MCH 29.1 26.0 - 34.0 pg    MCHC 32.8 32.0 - 36.0 g/dL    RDW 13.2 11.5 - 14.5 %    Platelets 228 150 - 450 x10*3/uL    Neutrophils % 80.3 40.0 - 80.0 %    Immature Granulocytes %, Automated 0.4 0.0 - 0.9 %    Lymphocytes % 14.7 13.0 - 44.0 %    Monocytes % 4.6 2.0 - 10.0 %    Eosinophils % 0.0 0.0 - 6.0 %    Basophils % 0.0 0.0 - 2.0 %    Neutrophils Absolute 6.00 1.20 - 7.70 x10*3/uL    Immature Granulocytes Absolute, Automated 0.03 0.00 - 0.70 x10*3/uL    Lymphocytes Absolute 1.10 (L) 1.20 - 4.80 x10*3/uL    Monocytes Absolute 0.34 0.10 - 1.00 x10*3/uL    Eosinophils Absolute 0.00 0.00 - 0.70 x10*3/uL    Basophils Absolute 0.00 0.00 - 0.10 x10*3/uL   Magnesium    Collection Time: 12/29/24  5:22 AM   Result Value Ref Range    Magnesium 1.47 (L) 1.60 - 2.40 mg/dL   Red Top    Collection Time: 12/30/24  5:51 AM   Result Value Ref Range    Extra Tube Hold for add-ons.    SST TOP    Collection Time: 12/30/24  5:51 AM   Result Value Ref Range    Extra Tube Hold for add-ons.    Comprehensive metabolic panel     Collection Time: 12/30/24  5:53 AM   Result Value Ref Range    Glucose 75 74 - 99 mg/dL    Sodium 143 136 - 145 mmol/L    Potassium 3.4 (L) 3.5 - 5.3 mmol/L    Chloride 106 98 - 107 mmol/L    Bicarbonate 30 21 - 32 mmol/L    Anion Gap 10 10 - 20 mmol/L    Urea Nitrogen 17 6 - 23 mg/dL    Creatinine 0.91 0.50 - 1.05 mg/dL    eGFR 78 >60 mL/min/1.73m*2    Calcium 6.6 (L) 8.6 - 10.3 mg/dL    Albumin 3.1 (L) 3.4 - 5.0 g/dL    Alkaline Phosphatase 53 33 - 110 U/L    Total Protein 5.5 (L) 6.4 - 8.2 g/dL    AST 13 9 - 39 U/L    Bilirubin, Total 0.2 0.0 - 1.2 mg/dL    ALT 9 7 - 45 U/L   CBC and Auto Differential    Collection Time: 12/30/24  5:53 AM   Result Value Ref Range    WBC 8.7 4.4 - 11.3 x10*3/uL    nRBC 0.0 0.0 - 0.0 /100 WBCs    RBC 3.35 (L) 4.00 - 5.20 x10*6/uL    Hemoglobin 9.9 (L) 12.0 - 16.0 g/dL    Hematocrit 29.9 (L) 36.0 - 46.0 %    MCV 89 80 - 100 fL    MCH 29.6 26.0 - 34.0 pg    MCHC 33.1 32.0 - 36.0 g/dL    RDW 13.2 11.5 - 14.5 %    Platelets 233 150 - 450 x10*3/uL    Neutrophils % 51.9 40.0 - 80.0 %    Immature Granulocytes %, Automated 0.5 0.0 - 0.9 %    Lymphocytes % 38.2 13.0 - 44.0 %    Monocytes % 9.1 2.0 - 10.0 %    Eosinophils % 0.2 0.0 - 6.0 %    Basophils % 0.1 0.0 - 2.0 %    Neutrophils Absolute 4.49 1.20 - 7.70 x10*3/uL    Immature Granulocytes Absolute, Automated 0.04 0.00 - 0.70 x10*3/uL    Lymphocytes Absolute 3.30 1.20 - 4.80 x10*3/uL    Monocytes Absolute 0.79 0.10 - 1.00 x10*3/uL    Eosinophils Absolute 0.02 0.00 - 0.70 x10*3/uL    Basophils Absolute 0.01 0.00 - 0.10 x10*3/uL   Magnesium    Collection Time: 12/30/24  5:53 AM   Result Value Ref Range    Magnesium 1.58 (L) 1.60 - 2.40 mg/dL   B-type natriuretic peptide    Collection Time: 12/30/24  5:53 AM   Result Value Ref Range    BNP 66 0 - 99 pg/mL        XR chest 2 views    Result Date: 12/30/2024  Interpreted By:  Troy Sutton, STUDY: XR CHEST 2 VIEWS;  12/30/2024 1:06 pm   INDICATION: Signs/Symptoms:shortness of breath.      COMPARISON: 12/28/2024   ACCESSION NUMBER(S): [id number]   ORDERING CLINICIAN: ROHAN JUSTICE   FINDINGS: Two view chest   Trachea nondisplaced. Heart size stable. No definite new confluent pulmonary infiltrates or sizeable effusions. Probable biapical pleural-parenchymal scarring similar to prior.         1.  Stable radiographic appearance of the chest without definite acute findings.       MACRO: None   Signed by: Troy Sutton 12/30/2024 2:14 PM Dictation workstation:   [id number]      ASSESSMENT AND PLAN  DX: schizophrenia, decompensated    PLAN: Pt will require inpatient psychiatric care for stabilization  Pt cannot leave AMA  Please contact psych for pink slip once bed is secured  Pt would benefit from community coordination with CM for medical management  EPAT consulted       Thank you for allowing us to participate in the care of this patient. We will continue to follow-up with you. .    I spent 60 minutes in the professional and overall care of this patient.      Fahad Gupta, APRN-CNP

## 2025-01-01 VITALS
RESPIRATION RATE: 16 BRPM | DIASTOLIC BLOOD PRESSURE: 53 MMHG | SYSTOLIC BLOOD PRESSURE: 101 MMHG | HEART RATE: 62 BPM | TEMPERATURE: 97.9 F | WEIGHT: 119 LBS | OXYGEN SATURATION: 98 % | BODY MASS INDEX: 18.68 KG/M2 | HEIGHT: 67 IN

## 2025-01-01 LAB
ALBUMIN SERPL BCP-MCNC: 3.5 G/DL (ref 3.4–5)
ALP SERPL-CCNC: 67 U/L (ref 33–110)
ALT SERPL W P-5'-P-CCNC: 8 U/L (ref 7–45)
ANION GAP SERPL CALC-SCNC: 14 MMOL/L (ref 10–20)
AST SERPL W P-5'-P-CCNC: 12 U/L (ref 9–39)
BASOPHILS # BLD AUTO: 0.01 X10*3/UL (ref 0–0.1)
BASOPHILS NFR BLD AUTO: 0.1 %
BILIRUB SERPL-MCNC: 0.3 MG/DL (ref 0–1.2)
BUN SERPL-MCNC: 13 MG/DL (ref 6–23)
CALCIUM SERPL-MCNC: 7.1 MG/DL (ref 8.6–10.3)
CHLORIDE SERPL-SCNC: 102 MMOL/L (ref 98–107)
CO2 SERPL-SCNC: 30 MMOL/L (ref 21–32)
CREAT SERPL-MCNC: 0.81 MG/DL (ref 0.5–1.05)
EGFRCR SERPLBLD CKD-EPI 2021: 90 ML/MIN/1.73M*2
EOSINOPHIL # BLD AUTO: 0 X10*3/UL (ref 0–0.7)
EOSINOPHIL NFR BLD AUTO: 0 %
ERYTHROCYTE [DISTWIDTH] IN BLOOD BY AUTOMATED COUNT: 13 % (ref 11.5–14.5)
GLUCOSE SERPL-MCNC: 117 MG/DL (ref 74–99)
HCT VFR BLD AUTO: 32.3 % (ref 36–46)
HGB BLD-MCNC: 10.9 G/DL (ref 12–16)
IMM GRANULOCYTES # BLD AUTO: 0.04 X10*3/UL (ref 0–0.7)
IMM GRANULOCYTES NFR BLD AUTO: 0.5 % (ref 0–0.9)
LYMPHOCYTES # BLD AUTO: 1.17 X10*3/UL (ref 1.2–4.8)
LYMPHOCYTES NFR BLD AUTO: 16 %
MAGNESIUM SERPL-MCNC: 1.34 MG/DL (ref 1.6–2.4)
MCH RBC QN AUTO: 29.5 PG (ref 26–34)
MCHC RBC AUTO-ENTMCNC: 33.7 G/DL (ref 32–36)
MCV RBC AUTO: 87 FL (ref 80–100)
MONOCYTES # BLD AUTO: 0.42 X10*3/UL (ref 0.1–1)
MONOCYTES NFR BLD AUTO: 5.7 %
NEUTROPHILS # BLD AUTO: 5.69 X10*3/UL (ref 1.2–7.7)
NEUTROPHILS NFR BLD AUTO: 77.7 %
NRBC BLD-RTO: 0 /100 WBCS (ref 0–0)
PLATELET # BLD AUTO: 285 X10*3/UL (ref 150–450)
POTASSIUM SERPL-SCNC: 3.8 MMOL/L (ref 3.5–5.3)
PROT SERPL-MCNC: 5.8 G/DL (ref 6.4–8.2)
RBC # BLD AUTO: 3.7 X10*6/UL (ref 4–5.2)
SODIUM SERPL-SCNC: 142 MMOL/L (ref 136–145)
WBC # BLD AUTO: 7.3 X10*3/UL (ref 4.4–11.3)

## 2025-01-01 PROCEDURE — 2500000004 HC RX 250 GENERAL PHARMACY W/ HCPCS (ALT 636 FOR OP/ED): Performed by: INTERNAL MEDICINE

## 2025-01-01 PROCEDURE — 83735 ASSAY OF MAGNESIUM: CPT | Performed by: INTERNAL MEDICINE

## 2025-01-01 PROCEDURE — 2500000001 HC RX 250 WO HCPCS SELF ADMINISTERED DRUGS (ALT 637 FOR MEDICARE OP): Performed by: INTERNAL MEDICINE

## 2025-01-01 PROCEDURE — 85025 COMPLETE CBC W/AUTO DIFF WBC: CPT | Performed by: INTERNAL MEDICINE

## 2025-01-01 PROCEDURE — 84075 ASSAY ALKALINE PHOSPHATASE: CPT | Performed by: INTERNAL MEDICINE

## 2025-01-01 PROCEDURE — 2500000002 HC RX 250 W HCPCS SELF ADMINISTERED DRUGS (ALT 637 FOR MEDICARE OP, ALT 636 FOR OP/ED): Performed by: INTERNAL MEDICINE

## 2025-01-01 PROCEDURE — 36415 COLL VENOUS BLD VENIPUNCTURE: CPT | Performed by: INTERNAL MEDICINE

## 2025-01-01 PROCEDURE — 2500000001 HC RX 250 WO HCPCS SELF ADMINISTERED DRUGS (ALT 637 FOR MEDICARE OP): Performed by: PHYSICIAN ASSISTANT

## 2025-01-01 RX ORDER — LORAZEPAM 0.5 MG/1
0.5 TABLET ORAL EVERY 8 HOURS PRN
Start: 2025-01-01

## 2025-01-01 RX ORDER — QUETIAPINE FUMARATE 25 MG/1
25 TABLET, FILM COATED ORAL NIGHTLY
Qty: 30 TABLET | Refills: 1 | Status: SHIPPED | OUTPATIENT
Start: 2025-01-01

## 2025-01-01 RX ADMIN — CALCIUM 1250 MG: 500 TABLET ORAL at 08:41

## 2025-01-01 RX ADMIN — FLUDROCORTISONE ACETATE 0.1 MG: 0.1 TABLET ORAL at 08:41

## 2025-01-01 RX ADMIN — CALCIUM 1250 MG: 500 TABLET ORAL at 12:21

## 2025-01-01 RX ADMIN — HYDROCORTISONE 15 MG: 5 TABLET ORAL at 08:41

## 2025-01-01 RX ADMIN — MAGNESIUM OXIDE TAB 400 MG (241.3 MG ELEMENTAL MG) 400 MG: 400 (241.3 MG) TAB at 08:41

## 2025-01-01 RX ADMIN — CALCITRIOL CAPSULES 0.25 MCG 0.25 MCG: 0.25 CAPSULE ORAL at 08:41

## 2025-01-01 ASSESSMENT — PAIN SCALES - GENERAL: PAINLEVEL_OUTOF10: 0 - NO PAIN

## 2025-01-01 NOTE — CARE PLAN
The clinical goals for the shift include safety    Problem: Pain - Adult  Goal: Verbalizes/displays adequate comfort level or baseline comfort level  Reactivated     Problem: Safety - Adult  Goal: Free from fall injury  Reactivated

## 2025-01-01 NOTE — PROGRESS NOTES
Application for Emergency Admission      Ready for Transfer?  Is the patient medically cleared for transfer to inpatient psychiatry: Yes  Has the patient been accepted to an inpatient psychiatric hospital: Yes    Application for Emergency Admission  IN ACCORDANCE WITH [place] 5122[place]10 [place]  The Chief Clinical Officer of: Nilda Nelson 1/1/2025 .8:08 AM    Reason for Hospitalization  The undersigned has reason to believe that: Kriss Malloy Is a mentally ill person subject to hospitalization by court order under division B Section 5122.01 of the Revised Code, i.e., this person:    1.No  Represents a substantial risk of physical harm to self as manifested by evidence of threats of, or attempts at, suicide or serious self-inflicted bodily harm    2.No Represents a substantial risk of physical harm to others as manifested by evidence of recent homicidal or other violent behavior, evidence of recent threats that place another in reasonable fear of violent behavior and serious physical harm, or other evidence of present dangerousness    3.Yes Represents a substantial and immediate risk of serious physical impairment or injury to self as manifested by  evidence that the person is unable to provide for and is not providing for the person's basic physical needs because of the person's mental illness and that appropriate provision for those needs cannot be made  immediately available in the community    4.Yes Would benefit from treatment in a hospital for his mental illness and is in need of such treatment as manifested by evidence of behavior that creates a grave and imminent risk to substantial rights of others or  himself.    5.No Would benefit from treatment as manifested by evidence of behavior that indicates all of the following:       (a) The person is unlikely to survive safely in the community without supervision, based on a clinical determination.       (b) The person has a history of lack of compliance with  treatment for mental illness and one of the following applies:      (i) At least twice within the thirty-six months prior to the filing of an affidavit seeking court-ordered treatment of the person under section 5122.111 of the Revised Code, the lack of compliance has been a significant factor in necessitating hospitalization in a hospital or receipt of services in a forensic or other mental health unit of a correctional facility, provided that the thirty-six-month period shall be extended by the length of any hospitalization or incarceration of the person that occurred within the thirty-six-month period.      (ii) Within the forty-eight months prior to the filing of an affidavit seeking court-ordered treatment of the person under section 5122.111 of the Revised Code, the lack of compliance resulted in one or more acts of serious violent behavior toward self or others or threats of, or attempts at, serious physical harm to self or others, provided that the forty-eight-month period shall be extended by the length of any hospitalization or incarceration of the person that occurred within the forty-eight-month period.      (c) The person, as a result of mental illness, is unlikely to voluntarily participate in necessary treatment.       (d) In view of the person's treatment history and current behavior, the person is in need of treatment in order to prevent a relapse or deterioration that would be likely to result in substantial risk of serious harm to the person or others.    (e) Represents a substantial risk of physical harm to self or others if allowed to remain at liberty pending examination.    Therefore, it is requested that said person be admitted to the above named facility.    STATEMENT OF BELIEF    Must be filled out by one of the following: a psychiatrist, licensed physician, licensed clinical psychologist, health or ,  or .  (Statement shall include the circumstances under  which the individual was taken into custody and the reason for the person's belief that hospitalization is necessary. The statement shall also include a reference to efforts made to secure the individual's property at his residence if he was taken into custody there. Every reasonable and appropriate effort should be made to take this person into custody in the least conspicuous manner possible.)    Pt with a history of schizophrenia, non compliant with medication.  Pt is +psychomotor agitation. Religiously preoccupied. Pacing in room, praying and talking to self. Pt is internally stimulated. Noted to have sleep disturbance by hospital staff. Pt judgement and insight are impaired and would benefit from inpatient psychiatric care for stabilization.    Fahad Gupta, SUZI-CNP 1/1/2025     _____________________________________________________________   Place of Employment: [facility]    STATEMENT OF OBSERVATION BY PSYCHIATRIST, LICENSED PHYSICIAN, OR LICENSED CLINICAL PSYCHOLOGIST, IF APPLICABLE    Place of Observation (e.g., [facility], general hospital, office, emergency facility)  (If applicable, please complete)    Fahad Gupta, SUZI-CNP 1/1/2025    _____________________________________________________________

## 2025-01-01 NOTE — PROGRESS NOTES
Kriss Malloy is a 49 y.o. female on day 5 of admission presenting with Generalized weakness.      Subjective   Pt Refusing Meds and Labs/Tests - Explained need and Benefits of Meds. Pt stated she agreed. But also seen Talking Loudly to herself in Room.       Objective     Last Recorded Vitals  /70 (BP Location: Left arm, Patient Position: Sitting)   Pulse 75   Temp 36.8 °C (98.2 °F) (Temporal)   Resp 18   Wt 54 kg (119 lb)   SpO2 97%   Intake/Output last 3 Shifts:    Intake/Output Summary (Last 24 hours) at 1/1/2025 0046  Last data filed at 12/31/2024 0700  Gross per 24 hour   Intake 360 ml   Output --   Net 360 ml       Admission Weight  Weight: 54.4 kg (120 lb) (12/27/24 0729)    Daily Weight  12/27/24 : 54 kg (119 lb)    Image Results  ECG 12 lead  Normal sinus rhythm  Normal ECG  When compared with ECG of 29-NOV-2024 23:48,  PREVIOUS ECG IS PRESENT  See ED provider note for full interpretation and clinical correlation  Confirmed by Tonia Ruiz ([id number]) on 12/31/2024 10:50:17 AM      Physical Exam  Gen - NAD  ENT - NC  Neck - No JVD  H - S1S2  L - Decreased BS  Abd - Soft, ND  Ext - x 4, W & D  Neuro - Awake, Responsive  Relevant Results    Scheduled medications  calcitriol, 0.25 mcg, oral, BID  calcium carbonate, 1,250 mg, oral, TID  divalproex, 1,000 mg, oral, Nightly  fludrocortisone, 0.1 mg, oral, Daily  hydrocortisone, 10 mg, oral, Daily  hydrocortisone, 15 mg, oral, Daily  magnesium oxide, 400 mg, oral, Daily  QUEtiapine, 25 mg, oral, Nightly      Continuous medications     PRN medications  PRN medications: acetaminophen **OR** acetaminophen **OR** acetaminophen, docusate sodium, LORazepam, melatonin, midodrine  Results for orders placed or performed during the hospital encounter of 12/27/24 (from the past 24 hours)   Drug Screen, Urine   Result Value Ref Range    Amphetamine Screen, Urine Presumptive Negative Presumptive Negative    Barbiturate Screen, Urine Presumptive Negative Presumptive  Negative    Benzodiazepines Screen, Urine Presumptive Negative Presumptive Negative    Cannabinoid Screen, Urine Presumptive Negative Presumptive Negative    Cocaine Metabolite Screen, Urine Presumptive Negative Presumptive Negative    Fentanyl Screen, Urine Presumptive Negative Presumptive Negative    Opiate Screen, Urine Presumptive Negative Presumptive Negative    Oxycodone Screen, Urine Presumptive Negative Presumptive Negative    PCP Screen, Urine Presumptive Negative Presumptive Negative    Methadone Screen, Urine Presumptive Negative Presumptive Negative                Assessment/Plan      Yauco's Ds/Adrenal Insufficiency - ? Compliance ? - Meds Restarted  Hypoparathyroid  CP/SOB - Stated None at this time and Refusing Work Up  Schizophrenia - Seroquel added for Agitation  Consultants Appreciated  Pt Medically stable for Discharge to Psych            Assessment & Plan                  Reji Arciniega DO

## 2025-01-01 NOTE — NURSING NOTE
Nurse to nurse report called and given to Lucita at [facility]. Pt picked up and transported at this time by [facility]. AVS, henry linnette and seven day med rec given to  for delivery. No pain or discomfort voiced prior to discharge. Pt calm and cooperative with transport. All person belongings transported with pt.

## 2025-01-01 NOTE — PROGRESS NOTES
"    Endocrinology Inpatient Consult Progress Note     PATIENT NAME: Kriss Malloy  MRN: [id number]  DATE: 1/1/2025    CONSULTING PHYSICIAN: Dr NAHUN Arciniega  REASON FOR CONSULT: AIPS      Interval Events     Seen this morning.  The patient was sleeping.  I woke her up.  The patient did admit to refusing her calcium.  She states that she would no longer refuse any more medication.  She named all of her medications on her own.  \"I need all of those\"    Refused 2 doses of calcium yesterday.  Refused her evening dose of hydrocortisone.      Physical Examination     /70 (BP Location: Left arm, Patient Position: Lying)   Pulse 61   Temp 36.4 °C (97.5 °F) (Temporal)   Resp 16   Ht 1.702 m (5' 7\")   Wt 54 kg (119 lb)   SpO2 97%   BMI 18.64 kg/m²   No acute distress.  Appropriate affect.  Sometimes tangential.  Regular rate and rhythm.  Nonlabored respiration.  Soft nontender nondistended abdomen.  Negative Chovstek.      Medications     Reviewed MAR       Data     Recent Labs and Imaging Reviewed      Assessment / Plan        # Hypoparathyroidism  No chemistry since 12/30.  She has been refusing labs.    Patient refused 2 doses of calcium.  She continues to take calcitriol, but then refused her evening dose.    Chovstek sign is negative.    If the patient is deemed to not be allowed to leave the hospital, that she have the competence to refuse medications?  I would like psychiatry to weigh in on this question please.  I will send him an Epic Chat.     # Adrenal Insufficiency:   Home Regimen: HC 15-10-5mg. Fludrocortisone 0.1mcg Daily.     Refused her evening dose of hydrocortisone.    My concerns above remain.    I have discussed the case with the patient's bedtime RN, Carmine.  Even with coaxing, the patient has been refusing her medications.  The case was discussed with the patient's daytime RN as well as the nursing supervisor.         Vince Sharma, DO  Endocrinology, Diabetes, and Metabolism    Available via " EPIC Messenger    Please excuse any typographical or unwanted errors within this documentation as voice recognition software was used to dictate this note.

## 2025-01-01 NOTE — PROGRESS NOTES
MSW made outreach to collaborate with the pt's bedside RN. RN reporting the pt to dispo to [facility]. Transportation will be arrange by [facility]. MSW to sign off.

## 2025-01-19 ENCOUNTER — HOSPITAL ENCOUNTER (EMERGENCY)
Facility: HOSPITAL | Age: 50
Discharge: LEFT BEFORE DISCHARGE | End: 2025-01-19
Payer: COMMERCIAL

## 2025-01-19 ENCOUNTER — HOSPITAL ENCOUNTER (EMERGENCY)
Facility: HOSPITAL | Age: 50
Discharge: HOME | End: 2025-01-19
Payer: COMMERCIAL

## 2025-01-19 ENCOUNTER — HOSPITAL ENCOUNTER (OUTPATIENT)
Dept: CARDIOLOGY | Facility: HOSPITAL | Age: 50
Discharge: HOME | End: 2025-01-19
Payer: COMMERCIAL

## 2025-01-19 VITALS
BODY MASS INDEX: 18.48 KG/M2 | RESPIRATION RATE: 16 BRPM | OXYGEN SATURATION: 97 % | HEART RATE: 76 BPM | HEIGHT: 66 IN | SYSTOLIC BLOOD PRESSURE: 113 MMHG | TEMPERATURE: 98.2 F | DIASTOLIC BLOOD PRESSURE: 51 MMHG | WEIGHT: 115 LBS

## 2025-01-19 LAB
AMPHETAMINES UR QL SCN: NORMAL
APAP SERPL-MCNC: <10 UG/ML
BARBITURATES UR QL SCN: NORMAL
BENZODIAZ UR QL SCN: NORMAL
BZE UR QL SCN: NORMAL
CANNABINOIDS UR QL SCN: NORMAL
CARDIAC TROPONIN I PNL SERPL HS: 3 NG/L (ref 0–13)
ETHANOL SERPL-MCNC: <10 MG/DL
FENTANYL+NORFENTANYL UR QL SCN: NORMAL
GLUCOSE BLD MANUAL STRIP-MCNC: 108 MG/DL (ref 74–99)
HCG UR QL IA.RAPID: NEGATIVE
METHADONE UR QL SCN: NORMAL
OPIATES UR QL SCN: NORMAL
OXYCODONE+OXYMORPHONE UR QL SCN: NORMAL
PCP UR QL SCN: NORMAL
SALICYLATES SERPL-MCNC: <3 MG/DL

## 2025-01-19 PROCEDURE — 99283 EMERGENCY DEPT VISIT LOW MDM: CPT

## 2025-01-19 PROCEDURE — 99285 EMERGENCY DEPT VISIT HI MDM: CPT | Performed by: STUDENT IN AN ORGANIZED HEALTH CARE EDUCATION/TRAINING PROGRAM

## 2025-01-19 PROCEDURE — 99281 EMR DPT VST MAYX REQ PHY/QHP: CPT

## 2025-01-19 PROCEDURE — 82947 ASSAY GLUCOSE BLOOD QUANT: CPT

## 2025-01-19 PROCEDURE — 80307 DRUG TEST PRSMV CHEM ANLYZR: CPT | Performed by: NURSE PRACTITIONER

## 2025-01-19 PROCEDURE — 81025 URINE PREGNANCY TEST: CPT | Performed by: NURSE PRACTITIONER

## 2025-01-19 PROCEDURE — 4500999001 HC ED NO CHARGE

## 2025-01-19 PROCEDURE — 36415 COLL VENOUS BLD VENIPUNCTURE: CPT | Performed by: NURSE PRACTITIONER

## 2025-01-19 PROCEDURE — 93005 ELECTROCARDIOGRAM TRACING: CPT

## 2025-01-19 PROCEDURE — 84484 ASSAY OF TROPONIN QUANT: CPT | Performed by: NURSE PRACTITIONER

## 2025-01-19 PROCEDURE — 99284 EMERGENCY DEPT VISIT MOD MDM: CPT

## 2025-01-19 PROCEDURE — 80179 DRUG ASSAY SALICYLATE: CPT | Performed by: NURSE PRACTITIONER

## 2025-01-19 ASSESSMENT — PAIN SCALES - GENERAL: PAINLEVEL_OUTOF10: 0 - NO PAIN

## 2025-01-19 ASSESSMENT — PAIN - FUNCTIONAL ASSESSMENT: PAIN_FUNCTIONAL_ASSESSMENT: 0-10

## 2025-01-19 NOTE — ED TRIAGE NOTES
Pt states that she was unresponsive when she woke up. States that she was sleeping but wasn't breathing. When asked for her to explain what she means she was not able to. Pt states that she has addisons.

## 2025-01-19 NOTE — ED TRIAGE NOTES
"This patient was seen in triage.     Vitals are noted.      HPI:  patient presents to the emergency department for \"I woke up unresponsive\".  States that she knows that she was unresponsive because typically her mind is alive when she is sleeping.  Denies any fevers, chills, chest pain.  Does endorse some shortness of breath, has a history of Powersite's disease.  States that she feels improved but was concerned because she knows that \" I was unresponsive\".  Denies any SI, HI, AVH    Focused PE:  Gen: Well-appearing, not in acute distress  Cardiovascular: Regular rate, normal rhythm, no murmur, no gallop  Respiratory: No adventitious lung sounds auscultated.  Abdomen: No reproducible abdominal tenderness upon palpation,  physical exam may be limited by patient positioning sitting up in a chair  Neuro:  Alert and Oriented, speech clear and coherent     Plan:  EKG, labs        For the remainder of the patient's workup and ED course, please see the main ED provider note.  We discussed need for diagnostic testing including lab studies and imaging.  We also discussed that they may be asked to wait in the waiting room while these test are pending.  They understand that if they choose to leave without having the testing completed or resulted that we cannot rule out acute life-threatening illnesses and the risks involved to lead to worsening condition, permanent disability or even death.  "

## 2025-01-20 ENCOUNTER — APPOINTMENT (OUTPATIENT)
Dept: RADIOLOGY | Facility: HOSPITAL | Age: 50
End: 2025-01-20
Payer: COMMERCIAL

## 2025-01-20 ENCOUNTER — APPOINTMENT (OUTPATIENT)
Dept: CARDIOLOGY | Facility: HOSPITAL | Age: 50
End: 2025-01-20
Payer: COMMERCIAL

## 2025-01-20 ENCOUNTER — HOSPITAL ENCOUNTER (EMERGENCY)
Facility: HOSPITAL | Age: 50
Discharge: HOME | End: 2025-01-20
Attending: STUDENT IN AN ORGANIZED HEALTH CARE EDUCATION/TRAINING PROGRAM
Payer: COMMERCIAL

## 2025-01-20 VITALS
DIASTOLIC BLOOD PRESSURE: 66 MMHG | RESPIRATION RATE: 18 BRPM | BODY MASS INDEX: 20.02 KG/M2 | HEART RATE: 70 BPM | WEIGHT: 124.56 LBS | SYSTOLIC BLOOD PRESSURE: 100 MMHG | OXYGEN SATURATION: 100 % | HEIGHT: 66 IN | TEMPERATURE: 97.3 F

## 2025-01-20 DIAGNOSIS — R53.1 GENERALIZED WEAKNESS: Primary | ICD-10-CM

## 2025-01-20 LAB
ALBUMIN SERPL BCP-MCNC: 3.4 G/DL (ref 3.4–5)
ALP SERPL-CCNC: 57 U/L (ref 33–110)
ALT SERPL W P-5'-P-CCNC: 6 U/L (ref 7–45)
ANION GAP SERPL CALC-SCNC: 11 MMOL/L (ref 10–20)
AST SERPL W P-5'-P-CCNC: 13 U/L (ref 9–39)
ATRIAL RATE: 64 BPM
B-HCG SERPL-ACNC: 8 MIU/ML
BASOPHILS # BLD AUTO: 0.02 X10*3/UL (ref 0–0.1)
BASOPHILS NFR BLD AUTO: 0.2 %
BILIRUB SERPL-MCNC: 0.2 MG/DL (ref 0–1.2)
BUN SERPL-MCNC: 23 MG/DL (ref 6–23)
CALCIUM SERPL-MCNC: 7.5 MG/DL (ref 8.6–10.3)
CHLORIDE SERPL-SCNC: 103 MMOL/L (ref 98–107)
CO2 SERPL-SCNC: 31 MMOL/L (ref 21–32)
CREAT SERPL-MCNC: 0.81 MG/DL (ref 0.5–1.05)
EGFRCR SERPLBLD CKD-EPI 2021: 90 ML/MIN/1.73M*2
EOSINOPHIL # BLD AUTO: 0.01 X10*3/UL (ref 0–0.7)
EOSINOPHIL NFR BLD AUTO: 0.1 %
ERYTHROCYTE [DISTWIDTH] IN BLOOD BY AUTOMATED COUNT: 13.2 % (ref 11.5–14.5)
FLUAV RNA RESP QL NAA+PROBE: NOT DETECTED
FLUBV RNA RESP QL NAA+PROBE: NOT DETECTED
GLUCOSE SERPL-MCNC: 93 MG/DL (ref 74–99)
HCT VFR BLD AUTO: 33.1 % (ref 36–46)
HGB BLD-MCNC: 11.1 G/DL (ref 12–16)
IMM GRANULOCYTES # BLD AUTO: 0.18 X10*3/UL (ref 0–0.7)
IMM GRANULOCYTES NFR BLD AUTO: 1.6 % (ref 0–0.9)
LYMPHOCYTES # BLD AUTO: 3.44 X10*3/UL (ref 1.2–4.8)
LYMPHOCYTES NFR BLD AUTO: 30.2 %
MAGNESIUM SERPL-MCNC: 1.52 MG/DL (ref 1.6–2.4)
MCH RBC QN AUTO: 29.6 PG (ref 26–34)
MCHC RBC AUTO-ENTMCNC: 33.5 G/DL (ref 32–36)
MCV RBC AUTO: 88 FL (ref 80–100)
MONOCYTES # BLD AUTO: 1.39 X10*3/UL (ref 0.1–1)
MONOCYTES NFR BLD AUTO: 12.2 %
NEUTROPHILS # BLD AUTO: 6.34 X10*3/UL (ref 1.2–7.7)
NEUTROPHILS NFR BLD AUTO: 55.7 %
NRBC BLD-RTO: 0 /100 WBCS (ref 0–0)
P AXIS: 60 DEGREES
P OFFSET: 182 MS
P ONSET: 154 MS
PLATELET # BLD AUTO: 195 X10*3/UL (ref 150–450)
POTASSIUM SERPL-SCNC: 3.6 MMOL/L (ref 3.5–5.3)
PR INTERVAL: 134 MS
PROT SERPL-MCNC: 6.1 G/DL (ref 6.4–8.2)
Q ONSET: 221 MS
QRS COUNT: 11 BEATS
QRS DURATION: 72 MS
QT INTERVAL: 414 MS
QTC CALCULATION(BAZETT): 427 MS
QTC FREDERICIA: 422 MS
R AXIS: 70 DEGREES
RBC # BLD AUTO: 3.75 X10*6/UL (ref 4–5.2)
SARS-COV-2 RNA RESP QL NAA+PROBE: NOT DETECTED
SODIUM SERPL-SCNC: 141 MMOL/L (ref 136–145)
T AXIS: 58 DEGREES
T OFFSET: 428 MS
T4 FREE SERPL-MCNC: 0.93 NG/DL (ref 0.61–1.12)
TSH SERPL-ACNC: 4.71 MIU/L (ref 0.44–3.98)
VENTRICULAR RATE: 64 BPM
WBC # BLD AUTO: 11.4 X10*3/UL (ref 4.4–11.3)

## 2025-01-20 PROCEDURE — 87636 SARSCOV2 & INF A&B AMP PRB: CPT | Performed by: STUDENT IN AN ORGANIZED HEALTH CARE EDUCATION/TRAINING PROGRAM

## 2025-01-20 PROCEDURE — 84443 ASSAY THYROID STIM HORMONE: CPT | Performed by: STUDENT IN AN ORGANIZED HEALTH CARE EDUCATION/TRAINING PROGRAM

## 2025-01-20 PROCEDURE — 83735 ASSAY OF MAGNESIUM: CPT | Performed by: STUDENT IN AN ORGANIZED HEALTH CARE EDUCATION/TRAINING PROGRAM

## 2025-01-20 PROCEDURE — 2500000004 HC RX 250 GENERAL PHARMACY W/ HCPCS (ALT 636 FOR OP/ED): Performed by: STUDENT IN AN ORGANIZED HEALTH CARE EDUCATION/TRAINING PROGRAM

## 2025-01-20 PROCEDURE — 93005 ELECTROCARDIOGRAM TRACING: CPT

## 2025-01-20 PROCEDURE — 80053 COMPREHEN METABOLIC PANEL: CPT | Performed by: STUDENT IN AN ORGANIZED HEALTH CARE EDUCATION/TRAINING PROGRAM

## 2025-01-20 PROCEDURE — 84702 CHORIONIC GONADOTROPIN TEST: CPT | Performed by: STUDENT IN AN ORGANIZED HEALTH CARE EDUCATION/TRAINING PROGRAM

## 2025-01-20 PROCEDURE — 71045 X-RAY EXAM CHEST 1 VIEW: CPT | Mod: FOREIGN READ | Performed by: RADIOLOGY

## 2025-01-20 PROCEDURE — 36415 COLL VENOUS BLD VENIPUNCTURE: CPT | Performed by: STUDENT IN AN ORGANIZED HEALTH CARE EDUCATION/TRAINING PROGRAM

## 2025-01-20 PROCEDURE — 84439 ASSAY OF FREE THYROXINE: CPT | Performed by: STUDENT IN AN ORGANIZED HEALTH CARE EDUCATION/TRAINING PROGRAM

## 2025-01-20 PROCEDURE — 71045 X-RAY EXAM CHEST 1 VIEW: CPT

## 2025-01-20 PROCEDURE — 85025 COMPLETE CBC W/AUTO DIFF WBC: CPT | Performed by: STUDENT IN AN ORGANIZED HEALTH CARE EDUCATION/TRAINING PROGRAM

## 2025-01-20 RX ORDER — LANOLIN ALCOHOL/MO/W.PET/CERES
400 CREAM (GRAM) TOPICAL DAILY
Status: DISCONTINUED | OUTPATIENT
Start: 2025-01-20 | End: 2025-01-20 | Stop reason: HOSPADM

## 2025-01-20 RX ADMIN — MAGNESIUM OXIDE TAB 400 MG (241.3 MG ELEMENTAL MG) 400 MG: 400 (241.3 MG) TAB at 03:18

## 2025-01-20 NOTE — ED TRIAGE NOTES
50 Y/O FEMALE BIB EMS FROM HOME. STATES SHE AWOKE THIS MORNING AND WAS UNRESPONSIVE. CONTINUES TO  STATE THAT HER EARS ARE POPPING AND FEELS LIKE HER STAMINA IS DOWN. DENIES N/V/D DENIES PAIN. BGL IN TRIAGE 108. PATIENT UNABLE TO SIT STILL IN TRIAGE. DENIES DRUGS/ETOH.

## 2025-01-20 NOTE — ED PROVIDER NOTES
EMERGENCY DEPARTMENT ENCOUNTER      Pt Name: Kriss Malloy  MRN: [id number]  Birthdate 1975  Date of evaluation: 1/19/2025  Provider: Omar Jeffers DO    CHIEF COMPLAINT       Chief Complaint   Patient presents with    Weakness, Gen       HISTORY OF PRESENT ILLNESS    Kriss Malloy is a 49 y.o. female who presents to the emergency department with Her self for generalized weakness as well as being reportedly unresponsive.  Patient states earlier today she was lying in bed felt weak all over.  She also notes that she has had a nonproductive cough.  No measured fevers at home.  No associated chest pain or shortness of breath.  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  She describes the unresponsive episode as stating that she was not actually there she felt that she did not have a pulse although remembers all of the events had no syncopal episode.  No further related symptoms at this time.          Nursing Notes were reviewed.    REVIEW OF SYSTEMS     CONSTITUTIONAL: Denies fever, sweats, chills.   NEURO: Endorses generalized weakness.  Denies difficulty walking, numbness, tingling, headache.   HEENT: Denies sore throat, rhinorrhea, changes in vision.   CARDIO: Denies chest pain, palpitations.  PULM: Denies shortness of breath, cough.   GI: Denies abdominal pain, nausea, vomiting, diarrhea, constipation, melena, hematochezia.  : Denies painful urination, frequency, hematuria.   MSK: Denies recent trauma.   SKIN: Denies rash, lesions.   ENDOCRINE: Denies unexpected weight-loss.   HEME: Denies bleeding disorder.     PAST MEDICAL HISTORY   No past medical history on file.  Melchor's disease  SURGICAL HISTORY     No past surgical history on file.    ALLERGIES     Patient has no known allergies.    FAMILY HISTORY     No family history on file.     SOCIAL HISTORY       Social History     Socioeconomic History     Marital status:    Tobacco Use    Smoking status: Former     Types: Cigarettes    Tobacco comments:     Smoked as a teenager   Substance and Sexual Activity    Alcohol use: Not Currently    Drug use: Not Currently     Social Drivers of Health     Financial Resource Strain: Low Risk  (12/27/2024)    Overall Financial Resource Strain (CARDIA)     Difficulty of Paying Living Expenses: Not very hard   Food Insecurity: No Food Insecurity (12/27/2024)    Hunger Vital Sign     Worried About Running Out of Food in the Last Year: Never true     Ran Out of Food in the Last Year: Never true   Transportation Needs: No Transportation Needs (12/27/2024)    PRAPARE - Transportation     Lack of Transportation (Medical): No     Lack of Transportation (Non-Medical): No   Physical Activity: Not on File (9/2/2021)    [facility]    Physical Activity     Physical Activity: 0   Stress: Not on File (6/20/2022)    Received from [facility]    Stress     Stress: 0   Recent Concern: Stress - At Risk (6/20/2022)    Received from [facility]    Stress     Stress: 2   Social Connections: Not on File (6/20/2022)    Received from [facility]    Social Connections     Connectedness: 0   Intimate Partner Violence: Not At Risk (12/27/2024)    Humiliation, Afraid, Rape, and Kick questionnaire     Fear of Current or Ex-Partner: No     Emotionally Abused: No     Physically Abused: No     Sexually Abused: No   Housing Stability: Low Risk  (12/27/2024)    Housing Stability Vital Sign     Unable to Pay for Housing in the Last Year: No     Number of Times Moved in the Last Year: 1     Homeless in the Last Year: No       PHYSICAL EXAM   VS: As documented in the triage note from today's date and EMR flowsheet were reviewed.  Gen: Well developed. No acute distress. Seated in bed. Appears nontoxic.  GCS 15.  Skin: Warm. Dry. Intact. No rashes or lesions.  Eyes: Pupils equally round and reactive to light. Clear sclera. EOMI.  HENT: Atraumatic appearance.  Mucosal membranes moist. No oral lesions, uvula midline, airway patent.  TMs clear bilaterally nares clear bilaterally.  No meningismal signs.  CV: Regular rate and regular rhythm. S1, S2. No pedal edema. Warm extremities.  Resp: Nonlabored breathing Clear to auscultation bilaterally. No increased work of breathing.   GI: Soft and nontender. No rebound or guarding. Bowel sounds x4 present.  Charles sign [place] tenderness is negative.  MSK: Symmetric muscle bulk. No joint swelling in the extremities. Compartments are soft. Neurovascularly intact x4 extremities. Radial pulses +2 equal bilaterally.  Pedal pulses +2 equal bilaterally.  Extremities atraumatic.  Neuro: Alert. CN II - XII intact. Speech fluent. Moving all extremities. No focal deficits. Gait normal.  Psych: Appropriate. Kempt.    DIAGNOSTIC RESULTS   RADIOLOGY:   Non-plain film images such as CT, Ultrasound and MRI are read by the radiologist. Plain radiographic images are visualized and preliminarily interpreted by the emergency physician with the below findings: Chest x-ray no evidence of pneumonia.      Interpretation per the Radiologist below, if available at the time of this note:    XR chest 1 view   Final Result   No acute abnormality.   Signed by Yadiel Serrano MD            ED BEDSIDE ULTRASOUND:   Performed by ED Physician - none    LABS:  Labs Reviewed   CBC WITH AUTO DIFFERENTIAL - Abnormal       Result Value    WBC 11.4 (*)     nRBC 0.0      RBC 3.75 (*)     Hemoglobin 11.1 (*)     Hematocrit 33.1 (*)     MCV 88      MCH 29.6      MCHC 33.5      RDW 13.2      Platelets 195      Neutrophils % 55.7      Immature Granulocytes %, Automated 1.6 (*)     Lymphocytes % 30.2      Monocytes % 12.2      Eosinophils % 0.1      Basophils % 0.2      Neutrophils Absolute 6.34      Immature Granulocytes Absolute, Automated 0.18      Lymphocytes Absolute 3.44      Monocytes Absolute 1.39 (*)     Eosinophils Absolute 0.01      Basophils Absolute 0.02      COMPREHENSIVE METABOLIC PANEL - Abnormal    Glucose 93      Sodium 141      Potassium 3.6      Chloride 103      Bicarbonate 31      Anion Gap 11      Urea Nitrogen 23      Creatinine 0.81      eGFR 90      Calcium 7.5 (*)     Albumin 3.4      Alkaline Phosphatase 57      Total Protein 6.1 (*)     AST 13      Bilirubin, Total 0.2      ALT 6 (*)    MAGNESIUM - Abnormal    Magnesium 1.52 (*)    TSH WITH REFLEX TO FREE T4 IF ABNORMAL - Abnormal    Thyroid Stimulating Hormone 4.71 (*)     Narrative:     TSH testing is performed using different testing methodology at [facility] than at [facility]. Direct result comparisons should only be made within the same method.     HUMAN CHORIONIC GONADOTROPIN, SERUM QUANTITATIVE - Abnormal    HCG, Beta-Quantitative 8 (*)     Narrative:      Total HCG measurement is performed using the [employer] Access   Immunoassay which detects intact HCG and free beta HCG subunit.    This test is not indicated for use as a tumor marker.   HCG testing is performed using a different test methodology at [facility] than other [facility]. Direct result comparison   should only be made within the same method.       POCT GLUCOSE - Abnormal    POCT Glucose 108 (*)    SARS-COV-2 AND INFLUENZA A/B PCR - Normal    Flu A Result Not Detected      Flu B Result Not Detected      Coronavirus 2019, PCR Not Detected      Narrative:     This assay has received FDA Emergency Use Authorization (EUA) and  is only authorized for the duration of time that circumstances exist to justify the authorization of the emergency use of in vitro diagnostic tests for the detection of SARS-CoV-2 virus and/or diagnosis of COVID-19 infection under section 564(b)(1) of the Act, 21 U.S.C. 360bbb-3(b)(1). Testing for SARS-CoV-2 is only recommended for patients who meet current clinical and/or epidemiological criteria as defined by federal, state, or local public health directives.  This assay is an in vitro diagnostic nucleic acid amplification test for the qualitative detection of SARS-CoV-2, Influenza A, and Influenza B from nasopharyngeal specimens and has been validated for use at [facility]. Negative results do not preclude COVID-19 infections or Influenza A/B infections, and should not be used as the sole basis for diagnosis, treatment, or other management decisions. If Influenza A/B and RSV PCR results are negative, testing for Parainfluenza virus, Adenovirus and Metapneumovirus is routinely performed for [facility] and intensive care inpatients, and is available on other patients by placing an add-on request.    THYROXINE, FREE - Normal    Thyroxine, Free 0.93      Narrative:     Thyroxine Free testing is performed using different testing methodology at [facility] than at [facility]. Direct result comparisons should only be made within the same method.    Biotin can cause falsely elevated free T4 results. Patients taking a Biotin dose of up to 10 mg/day should refrain from taking Biotin for 24 hours before sample collection. Patient taking a Biotin dose of >10 mg/day should consult with their physician or the laboratory before the blood draw.   URINALYSIS WITH REFLEX CULTURE AND MICROSCOPIC    Narrative:     The following orders were created for panel order Urinalysis with Reflex Culture and Microscopic.  Procedure                               Abnormality         Status                     ---------                               -----------         ------                     Urinalysis with Reflex C...[[id number]]                                                 Extra Urine Gray Tube[[id number]]                                                         Please view results for these tests on the individual orders.   URINALYSIS WITH REFLEX CULTURE AND MICROSCOPIC   EXTRA URINE GRAY TUBE       All other labs were within normal  "range or not returned as of this dictation.    EMERGENCY DEPARTMENT COURSE/MDM:   Vitals:    Vitals:    01/19/25 2048 01/20/25 0045 01/20/25 0100 01/20/25 0315   BP: 106/61  91/64 100/66   BP Location: Right arm      Patient Position: Sitting      Pulse: 73 59 66 70   Resp: 16  20 18   Temp: 36.3 °C (97.3 °F)      TempSrc: Temporal      SpO2: 98% 99% 99% 100%   Weight: 56.5 kg (124 lb 9 oz)      Height: 1.676 m (5' 6\")          I reviewed the patient's triage vitals and they are within normal range.    Due to the above findings the following was ordered basic labs to include TSH viral swabs and chest x-ray.    Lab work reveals elevated TSH although free T4 within normal range.  Patient was mildly hypomagnesemic for which she was given oral magnesium.  Viral swabs are negative.  Otherwise no significant electrolyte derangements renal function is appropriate.  Mild anemia is present this is improved from baseline.  She does have a minimal leukopenia no evidence of infectious etiology on clinical examination.  No indication for urinalysis at this time patient denies any urinary symptoms.  Chest x-ray is clear no evidence of pneumonia.  EKG without acute injury pattern or runs of dysrhythmias.  hCG minimally elevated clinically have a low concern for pregnancy patient denies any recent sexual activity.  I do believe this is likely perimenopausal related.  No indication for ultrasound imaging at this time even if she were to be pregnant imaging would be not diffuse at this low if an hCG.  Patient's recommended close follow-up 48 hours with gynecological services.  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  I do believe this may be a source of her fatigue.  She is appreciative care agreeable with plan discharged in stable condition.    ED Course as of 01/20/25 0330 Mon Jan 20, 2025   0100 " Interpreted by the Emergency Department Attending: ECG revealed normal sinus rhythm at a rate of 64 beats per minute with KS interval 134 , QRS of 72 , QTc of 427.  No acute injury pattern. Previous EKG on December 27 revealed no significant changes.    [MG]      ED Course User Index  [MG] Omar Jeffers DO         Diagnoses as of 01/20/25 0330   Generalized weakness       Patient was counseled regarding labs, imaging, likely diagnosis, and plan. All questions were answered.     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  Shared medical decision making patient requesting home-going endocrinologist which was provided.  ------------------------------------------------------------------  ED Medications administered this visit:    Medications   magnesium oxide (Mag-Ox) tablet 400 mg (400 mg oral Given 1/20/25 0318)       New Prescriptions from this visit:    New Prescriptions    No medications on file       Follow-up:  Ck Boudreaux DO  [address] [facility] [address]  [phone]    Schedule an appointment as soon as possible for a visit in 2 days      [facility]  [address]  [facility] [id number] [phone]  Go to   If symptoms worsen    Ramona Wall MD  [address] [facility] [id number] [phone]    Schedule an appointment as soon as possible for a visit       Vince Sharma DO  [address]  [facility] [address]  [facility] [address]  [phone]    Schedule an appointment as soon as possible for a visit in 1 day          Final Impression:   1. Generalized weakness          Omar Jeffers DO    (Please note that portions of this note were completed with a voice recognition program.  Efforts were made to edit the  dictations but occasionally words are mis-transcribed.)     Omar Jeffers, DO  01/20/25 033

## 2025-01-20 NOTE — DISCHARGE INSTRUCTIONS
As discussed I do recommend that you follow-up with gynecology regarding your elevated hCG level we do believe this is likely perimenopausal related low concern for pregnancy although it is imperative that you do have labs repeated as soon as possible.  Also please establish care with endocrinologist as provided due to your partially managed Pickaway's disease.  Should you been experiencing new chest pain shortness of breath fevers symptoms concerning to call 911 or return to the nearest emergency department immediately.

## 2025-01-21 LAB
ATRIAL RATE: 60 BPM
P AXIS: 30 DEGREES
PR INTERVAL: 151 MS
Q ONSET: 252 MS
QRS COUNT: 9 BEATS
QRS DURATION: 119 MS
QT INTERVAL: 457 MS
QTC CALCULATION(BAZETT): 457 MS
QTC FREDERICIA: 457 MS
R AXIS: 45 DEGREES
T AXIS: 58 DEGREES
T OFFSET: 480 MS
VENTRICULAR RATE: 60 BPM

## 2025-01-25 ENCOUNTER — APPOINTMENT (OUTPATIENT)
Dept: CARDIOLOGY | Facility: HOSPITAL | Age: 50
End: 2025-01-25
Payer: COMMERCIAL

## 2025-01-25 ENCOUNTER — HOSPITAL ENCOUNTER (EMERGENCY)
Facility: HOSPITAL | Age: 50
Discharge: LEFT BEFORE DISCHARGE | End: 2025-01-25
Payer: COMMERCIAL

## 2025-01-25 VITALS
WEIGHT: 124 LBS | BODY MASS INDEX: 21.17 KG/M2 | HEIGHT: 64 IN | DIASTOLIC BLOOD PRESSURE: 56 MMHG | SYSTOLIC BLOOD PRESSURE: 113 MMHG

## 2025-01-25 DIAGNOSIS — Z86.39: ICD-10-CM

## 2025-01-25 DIAGNOSIS — R55 NEAR SYNCOPE: ICD-10-CM

## 2025-01-25 DIAGNOSIS — Z53.29 LEFT AGAINST MEDICAL ADVICE: Primary | ICD-10-CM

## 2025-01-25 LAB
ALBUMIN SERPL BCP-MCNC: 3.6 G/DL (ref 3.4–5)
ALP SERPL-CCNC: 53 U/L (ref 33–110)
ALT SERPL W P-5'-P-CCNC: 7 U/L (ref 7–45)
ANION GAP SERPL CALC-SCNC: 13 MMOL/L (ref 10–20)
AST SERPL W P-5'-P-CCNC: 13 U/L (ref 9–39)
B-HCG SERPL-ACNC: 7 MIU/ML
BASOPHILS # BLD AUTO: 0.01 X10*3/UL (ref 0–0.1)
BASOPHILS NFR BLD AUTO: 0.1 %
BILIRUB SERPL-MCNC: 0.4 MG/DL (ref 0–1.2)
BUN SERPL-MCNC: 15 MG/DL (ref 6–23)
CALCIUM SERPL-MCNC: 8 MG/DL (ref 8.6–10.3)
CHLORIDE SERPL-SCNC: 104 MMOL/L (ref 98–107)
CO2 SERPL-SCNC: 29 MMOL/L (ref 21–32)
CREAT SERPL-MCNC: 0.96 MG/DL (ref 0.5–1.05)
EGFRCR SERPLBLD CKD-EPI 2021: 74 ML/MIN/1.73M*2
EOSINOPHIL # BLD AUTO: 0.02 X10*3/UL (ref 0–0.7)
EOSINOPHIL NFR BLD AUTO: 0.2 %
ERYTHROCYTE [DISTWIDTH] IN BLOOD BY AUTOMATED COUNT: 13.2 % (ref 11.5–14.5)
GLUCOSE SERPL-MCNC: 129 MG/DL (ref 74–99)
HCT VFR BLD AUTO: 34.9 % (ref 36–46)
HGB BLD-MCNC: 11.9 G/DL (ref 12–16)
IMM GRANULOCYTES # BLD AUTO: 0.03 X10*3/UL (ref 0–0.7)
IMM GRANULOCYTES NFR BLD AUTO: 0.3 % (ref 0–0.9)
LYMPHOCYTES # BLD AUTO: 3.15 X10*3/UL (ref 1.2–4.8)
LYMPHOCYTES NFR BLD AUTO: 32.6 %
MAGNESIUM SERPL-MCNC: 1.23 MG/DL (ref 1.6–2.4)
MCH RBC QN AUTO: 29.5 PG (ref 26–34)
MCHC RBC AUTO-ENTMCNC: 34.1 G/DL (ref 32–36)
MCV RBC AUTO: 86 FL (ref 80–100)
MONOCYTES # BLD AUTO: 0.91 X10*3/UL (ref 0.1–1)
MONOCYTES NFR BLD AUTO: 9.4 %
NEUTROPHILS # BLD AUTO: 5.53 X10*3/UL (ref 1.2–7.7)
NEUTROPHILS NFR BLD AUTO: 57.4 %
NRBC BLD-RTO: 0 /100 WBCS (ref 0–0)
PLATELET # BLD AUTO: 215 X10*3/UL (ref 150–450)
POTASSIUM SERPL-SCNC: 3.2 MMOL/L (ref 3.5–5.3)
PROT SERPL-MCNC: 6.3 G/DL (ref 6.4–8.2)
RBC # BLD AUTO: 4.04 X10*6/UL (ref 4–5.2)
SODIUM SERPL-SCNC: 143 MMOL/L (ref 136–145)
WBC # BLD AUTO: 9.7 X10*3/UL (ref 4.4–11.3)

## 2025-01-25 PROCEDURE — 83735 ASSAY OF MAGNESIUM: CPT | Performed by: PHYSICIAN ASSISTANT

## 2025-01-25 PROCEDURE — 84702 CHORIONIC GONADOTROPIN TEST: CPT | Performed by: PHYSICIAN ASSISTANT

## 2025-01-25 PROCEDURE — 99284 EMERGENCY DEPT VISIT MOD MDM: CPT

## 2025-01-25 PROCEDURE — 36415 COLL VENOUS BLD VENIPUNCTURE: CPT | Performed by: PHYSICIAN ASSISTANT

## 2025-01-25 PROCEDURE — 80053 COMPREHEN METABOLIC PANEL: CPT | Performed by: PHYSICIAN ASSISTANT

## 2025-01-25 PROCEDURE — 93005 ELECTROCARDIOGRAM TRACING: CPT

## 2025-01-25 PROCEDURE — 85025 COMPLETE CBC W/AUTO DIFF WBC: CPT | Performed by: PHYSICIAN ASSISTANT

## 2025-01-25 RX ORDER — CALCIUM CARBONATE/VITAMIN D3 500MG-5MCG
1 TABLET ORAL 2 TIMES DAILY
COMMUNITY

## 2025-01-25 RX ORDER — RISPERIDONE 4 MG/1
4 TABLET ORAL 2 TIMES DAILY
COMMUNITY

## 2025-01-25 RX ORDER — OMEPRAZOLE 20 MG/1
20 CAPSULE, DELAYED RELEASE ORAL DAILY
COMMUNITY

## 2025-01-25 RX ORDER — RESVER/WINE/BFL/GRPSD/PC/C/POM 200MG-60MG
5000 CAPSULE ORAL DAILY
COMMUNITY

## 2025-01-25 RX ORDER — CALCITRIOL 0.5 UG/1
1 CAPSULE ORAL
COMMUNITY
Start: 2025-01-17

## 2025-01-25 ASSESSMENT — LIFESTYLE VARIABLES
HAVE YOU EVER FELT YOU SHOULD CUT DOWN ON YOUR DRINKING: NO
EVER FELT BAD OR GUILTY ABOUT YOUR DRINKING: NO
EVER HAD A DRINK FIRST THING IN THE MORNING TO STEADY YOUR NERVES TO GET RID OF A HANGOVER: NO
HAVE PEOPLE ANNOYED YOU BY CRITICIZING YOUR DRINKING: NO
TOTAL SCORE: 0

## 2025-01-25 NOTE — ED PROVIDER NOTES
HPI   No chief complaint on file.      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  The patient was brought in by EMS who reported the patient's initial complaint was abdominal pain however during my examination the patient reports no discomfort.  She states fatigue and lightheadedness but denies any syncope.  No reports of chest pain or shortness of breath.  No reports of vomiting or diarrhea.              Patient History   No past medical history on file.  No past surgical history on file.  No family history on file.  Social History     Tobacco Use    Smoking status: Former     Types: Cigarettes    Smokeless tobacco: Not on file    Tobacco comments:     Smoked as a teenager   Substance Use Topics    Alcohol use: Not Currently    Drug use: Not Currently       Physical Exam   ED Triage Vitals   Temp Pulse Resp BP   -- -- -- --      SpO2 Temp src Heart Rate Source Patient Position   -- -- -- --      BP Location FiO2 (%)     -- --       Physical Exam  Vitals and nursing note reviewed.   Constitutional:       General: She is not in acute distress.     Appearance: Normal appearance. She is normal weight. She is not ill-appearing, toxic-appearing or diaphoretic.   HENT:      Head: Normocephalic.      Nose: Nose normal.      Mouth/Throat:      Mouth: Mucous membranes are moist.   Eyes:      Extraocular Movements: Extraocular movements intact.      Conjunctiva/sclera: Conjunctivae normal.   Cardiovascular:      Rate and Rhythm: Normal rate and regular rhythm.      Pulses: Normal pulses.   Pulmonary:      Effort: Pulmonary effort is normal. No respiratory distress.      Breath sounds: Normal breath sounds.   Abdominal:      General: Abdomen is flat. There is no distension.      Palpations: Abdomen is soft.      Tenderness: There is no abdominal tenderness. There is no guarding or  rebound.   Musculoskeletal:         General: Normal range of motion.      Cervical back: Normal range of motion and neck supple.   Skin:     General: Skin is warm and dry.      Capillary Refill: Capillary refill takes less than 2 seconds.   Neurological:      General: No focal deficit present.      Mental Status: She is alert and oriented to person, place, and time.   Psychiatric:         Mood and Affect: Mood normal.         Behavior: Behavior normal.         Thought Content: Thought content normal.         Judgment: Judgment normal.           ED Course & MDM   Diagnoses as of 01/25/25 1811   Left against medical advice   Near syncope   History of [place] disease                 No data recorded                                 Medical Decision Making    Medical Decision Making & ED Course  Medical Decision Making:  Patient presented with vague complaints.  Patient reported during my exam near syncope.  I reviewed the patient's electronic medical records along with her most recent encounters.  Patient has had multiple ED visits over the past year.  Given her complaints and physical exam findings basic lab work was obtained along with EKG. EKG obtained interpreted by myself revealing sinus rhythm at a rate of 67 with occasional PVCs.  No evidence of acute STEMI.  QTc 443.  QRS duration 78.  Normal axis.  Shortly after lab work was obtained the patient requested to be discharged.  She stated that she felt well and no longer wanted treatment.  I explained the reasoning for ordering the blood work.  Discussed differentials at length.  Explained that if she left without her workup being complete this could lead to worsening symptoms for possibly permanent disability or even death.  Patient verbalized understanding once again requested to be discharged.  Patient will be discharged AGAINST MEDICAL ADVICE.  To be instructed to return immediately if she would like further care  --  Scoring Tools Utilized:  None    Differential diagnoses considered include but are not limited to: Dehydration, electrolyte dysfunction, ACS     Social Determinants of Health which Significantly Impact Care: None identified The following actions were taken to address these social determinants: None    EKG Independent Interpretation: EKG interpreted by myself. Please see ED Course for full interpretation.    Independent Result Review and Interpretation: Relevant laboratory and radiographic results were reviewed and independently interpreted by myself.  As necessary, they are commented on in the ED Course.    Chronic conditions affecting the patient's care: As documented above in MDM    The patient was discussed with the following consultants/services: None    Care Considerations: None    ED Course:     Disposition  The patient elected to leave the Emergency Department against medical advice. In my opinion, the patient has capacity to leave AMA. she is clinically sober, free from distracting injury, appears to have intact insight, judgment, and reason, and in my opinion has capacity to make decisions. I explained to her that these symptoms may represent a serious underlying medical condition and she verbalized understanding of my concerns and understands the consequences of leaving without complete evaluation.    I had a discussion with the patient about her workup and results, and informed her what the next step in diagnosis and treatment would be, and she verbalized understanding. I explained the risks of leaving without further workup or treatment, which included reasonably foreseeable complications such as death, serious injury, and permanent disability. I also offered alternatives to departing AMA.    I have asked her to return as soon as possible to complete her evaluation, and also explained that she is welcome to return to the ED for further evaluation whenever she chooses. I have asked her to follow up with her primary doctor as soon  as possible. All of her questions were answered and she was given oral discharge instructions.      Patient was seen independently    Edvin Chau PA-C  Emergency Medicine            Procedure  Procedures     Edvin Chau PA-C  01/25/25 3913

## 2025-01-25 NOTE — ED NOTES
Patient with longstanding history of Schizoprenia arrived via squad for c/o general weakness, refused an IV and after her labs were obtained, she began to get upset and proceeded to leave [place].      Demi Hoffmann RN  01/25/25 0721

## 2025-01-25 NOTE — DISCHARGE INSTRUCTIONS
You are leaving AGAINST MEDICAL ADVICE.  Your workup was not complete.  Your condition may worsen leading to permanent disability or death.  If you would like further care you should return to the emergency department immediately otherwise follow-up with your doctor soon as possible

## 2025-01-27 LAB
ATRIAL RATE: 67 BPM
P AXIS: 19 DEGREES
P OFFSET: 185 MS
P ONSET: 148 MS
PR INTERVAL: 148 MS
Q ONSET: 222 MS
QRS COUNT: 11 BEATS
QRS DURATION: 78 MS
QT INTERVAL: 420 MS
QTC CALCULATION(BAZETT): 443 MS
QTC FREDERICIA: 436 MS
R AXIS: 42 DEGREES
T AXIS: 48 DEGREES
T OFFSET: 432 MS
VENTRICULAR RATE: 67 BPM

## 2025-02-06 NOTE — DISCHARGE SUMMARY
Discharge Diagnosis  Generalized weakness    Issues Requiring Follow-Up  [place]'s Ds, Hypothyroidism, Hypocalcemia, Schachenmann's Sx, Schizophrenia/Schizoaffective D/O, and NonCompliance    Discharge Meds     Medication List      START taking these medications     acetaminophen 325 mg tablet; Commonly known as: Tylenol; Take 2 tablets   (650 mg) by mouth every 4 hours if needed for mild pain (1 - 3), fever   (temp greater than 38.0 C) or headaches.   calcium carbonate 500 mg calcium (1,250 mg) tablet; Commonly known as:   Oscal; Take 1 tablet (1,250 mg) by mouth 3 times daily (morning, midday,   late afternoon).   docusate sodium 100 mg capsule; Commonly known as: Colace; Take 1   capsule (100 mg) by mouth 2 times a day as needed for constipation.   LORazepam 0.5 mg tablet; Commonly known as: Ativan; Take 1 tablet (0.5   mg) by mouth every 8 hours if needed for anxiety.   magnesium oxide 400 mg (241.3 mg magnesium) tablet; Commonly known as:   Mag-Ox; Take 1 tablet (400 mg) by mouth once daily.   melatonin 5 mg tablet; Take 1 tablet (5 mg) by mouth as needed at   bedtime for sleep.     CHANGE how you take these medications     * hydrocortisone 5 mg tablet; Commonly known as: Cortef; Take 1 tablet   (5 mg) by mouth once daily in the evening. Take with meals.; What changed:   how much to take, how to take this, when to take this, additional   instructions   * hydrocortisone 5 mg tablet; Commonly known as: Cortef; Take 2 tablets   (10 mg) by mouth once daily at noon. Take with meals AND 1 tablet (5 mg)   once daily in the evening; What changed: See the new instructions.   * hydrocortisone 10 mg tablet; Commonly known as: Cortef; Take 1.5   tablets (15 mg) by mouth once daily in the morning.; What changed: how   much to take, how to take this, when to take this   * QUEtiapine 25 mg tablet; Commonly known as: SEROquel; TAKE 1 TABLET BY   MOUTH ONCE DAILY AT BEDTIME; What changed: Another medication with the   same name  was added. Make sure you understand how and when to take each.   * QUEtiapine 25 mg tablet; Commonly known as: SEROquel; Take 1 tablet   (25 mg) by mouth once daily at bedtime.; What changed: You were already   taking a medication with the same name, and this prescription was added.   Make sure you understand how and when to take each.  * This list has 5 medication(s) that are the same as other medications   prescribed for you. Read the directions carefully, and ask your doctor or   other care provider to review them with you.     CONTINUE taking these medications     calcitriol 0.25 mcg capsule; Commonly known as: Rocaltrol; TAKE 2   CAPSULES BY MOUTH ONCE A DAY   divalproex 500 mg EC tablet; Commonly known as: Depakote; Take 2 tablets   (1,000 mg) by mouth once daily at bedtime. Do not crush, chew, or split.   fludrocortisone 0.1 mg tablet; Commonly known as: Florinef; Take 1   tablet (0.1 mg) by mouth once daily.       Test Results Pending At Discharge  Pending Labs       No current pending labs.            Hospital Course   Pt with PMHx including [place]'s Ds, Hypothyroid, Hypocalcemia, Schachenmann's Sx, Schizophrenia, and NonCompliance presented to [facility] ED with Weakness and SOB. Pt admitted to missing Medications. + Hypotension, + Hypocalcemia, and + Hypomagnesemia. Electrolytes replaced and Pt started on IV Fluids and IV Cortef. Endocrinology Consulted and Recommendations appreciated.Home Meds including Cortef restarted. Psych Consulted for Schizophrenia as Pt seen walking [place] multiple times talking to self. Inpatient Psych recommended. Pt stabilized and Discharged to Inpatient Psych to Follow up with Endocrinology and Own PCP. Advised to take Meds as Prescribed.    Pertinent Physical Exam At Time of Discharge  Physical Exam  Gen - NAD  ENT - NC  Neck - No JVD  H - S1S2  L - Decreased BS  Abd - Soft, ND  Ext - x 4, W & D  Neuro - Awake and Responsive  Outpatient Follow-Up  No future  appointments.      Reji Arciniega, DO

## 2025-02-07 ENCOUNTER — HOSPITAL ENCOUNTER (EMERGENCY)
Facility: HOSPITAL | Age: 50
Discharge: HOME | End: 2025-02-07
Attending: EMERGENCY MEDICINE
Payer: COMMERCIAL

## 2025-02-07 ENCOUNTER — APPOINTMENT (OUTPATIENT)
Dept: CARDIOLOGY | Facility: HOSPITAL | Age: 50
End: 2025-02-07
Payer: COMMERCIAL

## 2025-02-07 VITALS
WEIGHT: 116 LBS | DIASTOLIC BLOOD PRESSURE: 70 MMHG | BODY MASS INDEX: 19.81 KG/M2 | RESPIRATION RATE: 18 BRPM | SYSTOLIC BLOOD PRESSURE: 113 MMHG | OXYGEN SATURATION: 97 % | HEIGHT: 64 IN | HEART RATE: 86 BPM | TEMPERATURE: 97.5 F

## 2025-02-07 DIAGNOSIS — E83.51 HYPOCALCEMIA: Primary | ICD-10-CM

## 2025-02-07 DIAGNOSIS — E27.1 ADDISON'S DISEASE (MULTI): ICD-10-CM

## 2025-02-07 DIAGNOSIS — R53.1 GENERALIZED WEAKNESS: ICD-10-CM

## 2025-02-07 DIAGNOSIS — E83.42 HYPOMAGNESEMIA: ICD-10-CM

## 2025-02-07 LAB
ALBUMIN SERPL BCP-MCNC: 3.6 G/DL (ref 3.4–5)
ALP SERPL-CCNC: 49 U/L (ref 33–110)
ALT SERPL W P-5'-P-CCNC: 8 U/L (ref 7–45)
ANION GAP SERPL CALC-SCNC: 13 MMOL/L (ref 10–20)
APAP SERPL-MCNC: <10 UG/ML
AST SERPL W P-5'-P-CCNC: 17 U/L (ref 9–39)
B-HCG SERPL-ACNC: 6 MIU/ML
BASOPHILS # BLD AUTO: 0 X10*3/UL (ref 0–0.1)
BASOPHILS NFR BLD AUTO: 0 %
BILIRUB SERPL-MCNC: 0.4 MG/DL (ref 0–1.2)
BUN SERPL-MCNC: 13 MG/DL (ref 6–23)
CA-I BLD-SCNC: 0.8 MMOL/L (ref 1.1–1.33)
CALCIUM SERPL-MCNC: 6.7 MG/DL (ref 8.6–10.3)
CHLORIDE SERPL-SCNC: 104 MMOL/L (ref 98–107)
CO2 SERPL-SCNC: 27 MMOL/L (ref 21–32)
CREAT SERPL-MCNC: 0.91 MG/DL (ref 0.5–1.05)
EGFRCR SERPLBLD CKD-EPI 2021: 78 ML/MIN/1.73M*2
EOSINOPHIL # BLD AUTO: 0.02 X10*3/UL (ref 0–0.7)
EOSINOPHIL NFR BLD AUTO: 0.3 %
ERYTHROCYTE [DISTWIDTH] IN BLOOD BY AUTOMATED COUNT: 13.2 % (ref 11.5–14.5)
ETHANOL SERPL-MCNC: <10 MG/DL
GLUCOSE SERPL-MCNC: 76 MG/DL (ref 74–99)
HCT VFR BLD AUTO: 34.3 % (ref 36–46)
HGB BLD-MCNC: 11.8 G/DL (ref 12–16)
IMM GRANULOCYTES # BLD AUTO: 0.02 X10*3/UL (ref 0–0.7)
IMM GRANULOCYTES NFR BLD AUTO: 0.3 % (ref 0–0.9)
LYMPHOCYTES # BLD AUTO: 2.2 X10*3/UL (ref 1.2–4.8)
LYMPHOCYTES NFR BLD AUTO: 32.6 %
MAGNESIUM SERPL-MCNC: 1.42 MG/DL (ref 1.6–2.4)
MCH RBC QN AUTO: 29.6 PG (ref 26–34)
MCHC RBC AUTO-ENTMCNC: 34.4 G/DL (ref 32–36)
MCV RBC AUTO: 86 FL (ref 80–100)
MONOCYTES # BLD AUTO: 0.66 X10*3/UL (ref 0.1–1)
MONOCYTES NFR BLD AUTO: 9.8 %
NEUTROPHILS # BLD AUTO: 3.84 X10*3/UL (ref 1.2–7.7)
NEUTROPHILS NFR BLD AUTO: 57 %
NRBC BLD-RTO: 0 /100 WBCS (ref 0–0)
PHOSPHATE SERPL-MCNC: 4.7 MG/DL (ref 2.5–4.9)
PLATELET # BLD AUTO: 204 X10*3/UL (ref 150–450)
POTASSIUM SERPL-SCNC: 3.5 MMOL/L (ref 3.5–5.3)
PROT SERPL-MCNC: 6.5 G/DL (ref 6.4–8.2)
RBC # BLD AUTO: 3.99 X10*6/UL (ref 4–5.2)
SALICYLATES SERPL-MCNC: <3 MG/DL
SODIUM SERPL-SCNC: 140 MMOL/L (ref 136–145)
TSH SERPL-ACNC: 2.44 MIU/L (ref 0.44–3.98)
WBC # BLD AUTO: 6.7 X10*3/UL (ref 4.4–11.3)

## 2025-02-07 PROCEDURE — 99284 EMERGENCY DEPT VISIT MOD MDM: CPT | Performed by: EMERGENCY MEDICINE

## 2025-02-07 PROCEDURE — 84443 ASSAY THYROID STIM HORMONE: CPT

## 2025-02-07 PROCEDURE — 80320 DRUG SCREEN QUANTALCOHOLS: CPT

## 2025-02-07 PROCEDURE — 84702 CHORIONIC GONADOTROPIN TEST: CPT

## 2025-02-07 PROCEDURE — 80053 COMPREHEN METABOLIC PANEL: CPT

## 2025-02-07 PROCEDURE — 93005 ELECTROCARDIOGRAM TRACING: CPT

## 2025-02-07 PROCEDURE — 83735 ASSAY OF MAGNESIUM: CPT

## 2025-02-07 PROCEDURE — 84100 ASSAY OF PHOSPHORUS: CPT

## 2025-02-07 PROCEDURE — 82330 ASSAY OF CALCIUM: CPT

## 2025-02-07 PROCEDURE — 36415 COLL VENOUS BLD VENIPUNCTURE: CPT

## 2025-02-07 PROCEDURE — 85025 COMPLETE CBC W/AUTO DIFF WBC: CPT

## 2025-02-07 RX ORDER — CALCITRIOL 0.25 UG/1
0.25 CAPSULE ORAL DAILY
Qty: 30 CAPSULE | Refills: 0 | Status: SHIPPED | OUTPATIENT
Start: 2025-02-07 | End: 2026-02-07

## 2025-02-07 RX ORDER — CALCITRIOL 0.25 UG/1
0.25 CAPSULE ORAL DAILY
Qty: 30 CAPSULE | Refills: 0 | Status: SHIPPED | OUTPATIENT
Start: 2025-02-07 | End: 2025-02-07

## 2025-02-07 RX ORDER — HYDROCORTISONE 10 MG/1
15 TABLET ORAL EVERY MORNING
Qty: 45 TABLET | Refills: 0 | Status: SHIPPED | OUTPATIENT
Start: 2025-02-07 | End: 2025-03-09

## 2025-02-07 RX ORDER — MAGNESIUM SULFATE HEPTAHYDRATE 40 MG/ML
2 INJECTION, SOLUTION INTRAVENOUS ONCE
Status: DISCONTINUED | OUTPATIENT
Start: 2025-02-07 | End: 2025-02-07 | Stop reason: HOSPADM

## 2025-02-07 RX ORDER — HYDROCORTISONE 10 MG/1
15 TABLET ORAL EVERY MORNING
Qty: 45 TABLET | Refills: 0 | Status: SHIPPED | OUTPATIENT
Start: 2025-02-07 | End: 2025-02-07

## 2025-02-07 RX ORDER — HYDROCORTISONE 5 MG/1
5 TABLET ORAL
Qty: 30 TABLET | Refills: 0 | Status: SHIPPED | OUTPATIENT
Start: 2025-02-07

## 2025-02-07 RX ORDER — HYDROCORTISONE 10 MG/1
10 TABLET ORAL ONCE
Status: DISCONTINUED | OUTPATIENT
Start: 2025-02-07 | End: 2025-02-07 | Stop reason: HOSPADM

## 2025-02-07 RX ORDER — CALCITRIOL 0.25 UG/1
0.25 CAPSULE ORAL ONCE
Status: DISCONTINUED | OUTPATIENT
Start: 2025-02-07 | End: 2025-02-07 | Stop reason: HOSPADM

## 2025-02-07 RX ORDER — HYDROCORTISONE 5 MG/1
5 TABLET ORAL
Qty: 30 TABLET | Refills: 0 | Status: SHIPPED | OUTPATIENT
Start: 2025-02-07 | End: 2025-02-07

## 2025-02-07 RX ORDER — CALCIUM GLUCONATE 20 MG/ML
2 INJECTION, SOLUTION INTRAVENOUS ONCE
Status: DISCONTINUED | OUTPATIENT
Start: 2025-02-07 | End: 2025-02-07 | Stop reason: HOSPADM

## 2025-02-07 RX ORDER — FLUDROCORTISONE ACETATE 0.1 MG/1
0.1 TABLET ORAL DAILY
Qty: 30 TABLET | Refills: 0 | Status: SHIPPED | OUTPATIENT
Start: 2025-02-07

## 2025-02-07 RX ORDER — CALCIUM CARBONATE 500(1250)
1250 TABLET ORAL
Qty: 90 TABLET | Refills: 0 | Status: SHIPPED | OUTPATIENT
Start: 2025-02-07 | End: 2025-03-09

## 2025-02-07 RX ORDER — CALCIUM CARBONATE 500(1250)
1250 TABLET ORAL
Qty: 90 TABLET | Refills: 0 | Status: SHIPPED | OUTPATIENT
Start: 2025-02-07 | End: 2025-02-07

## 2025-02-07 NOTE — ED PROVIDER NOTES
History of Present Illness     History provided by: Patient  Limitations to History: Mental Illness  External Records Reviewed with Brief Summary: Discharge Summary from 2025 which showed admission for melchor's and hypocalcemia     HPI:  Kriss Malloy is a 49 y.o. female with Pmhx of Melchor's Ds, Hypothyroid, Hypocalcemia, Schachenmann's Sx, Schizophrenia is presenting today with weakness. Patient reports that she feels like her calcium was low. She hasn't been taking her medications. Denies fevers, chills, shortness of breath, dysuria. Patient reports that she is talking to God. During evaluation patient is having a second conversation. Patient was well known to ed staff and at her baseline. Denies SI and HI    Physical Exam   Triage vitals:  T 36.4 °C (97.5 °F)  HR 86  /70  RR 18  O2 97 % None (Room air)    General: Awake, alert, in no acute distress  Eyes: Gaze conjugate.  No scleral icterus or injection  HENT: Normo-cephalic, atraumatic. No stridor  CV: Regular rate, regular rhythm. Radial pulses 2+ bilaterally  Resp: Breathing non-labored, speaking in full sentences.  Clear to auscultation bilaterally  GI: Soft, non-distended, non-tender. No rebound or guarding.  MSK/Extremities: No gross bony deformities. Moving all extremities. No peripheral edema.   Skin: Warm. Appropriate color  Neuro: Alert. Oriented. Face symmetric. Speech is fluent.  Gross strength and sensation intact in b/l UE and LEs  Psych: Appropriate mood and affect      Medical Decision Making & ED Course   Medical Decision Makin y.o. female   with Pmhx of [place]'s Ds, Hypothyroid, Hypocalcemia, Schachenmann's Sx, Schizophrenia is presenting today with weakness. Vitals within normal limits. No significant hypotension. Differential included electrolyte abnormality, medication noncompliance. Patient was at his baseline psychiatric state. Will obtain lab work given the history of Nunda's disease  hypothyroidism and  hypocalcemia. ED RN made multiple attempts at IV access. Patient was refusing. Patient was agreeable to a straight stick. Did straight stick for labs. It showed an anemia, hypocalcemia, hypo magnesium. IV repletion was ordered. Patient refused IV after an additional attempts. Patient was discharged with PO calcitriol, PO calcium. Was given a prescription for her steroids. Was advised to follow-up with endocrinology.  ----      Differential diagnoses considered include but are not limited to: Please see MDM.     Social Determinants of Health which Significantly Impact Care: None identified     EKG Independent Interpretation: EKG interpreted by myself. Please see ED Course and MDM for full interpretation.    Independent Result Review and Interpretation: Results were independently reviewed and interpreted by myself. Please see ED course and MDM for full interpretation.    Chronic conditions affecting the patient's care: As documented in the MDM    The patient was discussed with the following consultants/services: None    Care Considerations: As per MDM    ED Course:  ED Course as of 02/07/25 1417   Fri Feb 07, 2025   1114 EKG was independently interpreted showed sinus rhythm at a rate of 60. No ST segment elevations or T-wave inversions. Intervals within normal [WALDEMAR]   1209 HCG, Beta-Quantitative(!): 6  Similar to patient's baseline [WALDEMAR]   1209 POCT Calcium, Ionized(!): 0.80  Repleted and calcitriol given  [WALDEMAR]   1210 MAGNESIUM(!): 1.42  repleted [WALDEMAR]   1246 Patient refused repletion with an IV. RN attempted multiple times. I spoke with the patient regarding IV repletion. Patient was requesting PO medications.  [WALDEMAR]      ED Course User Index  [WALDEMAR] Kimmy Voss MD         Diagnoses as of 02/07/25 1417   Hypocalcemia   Hypomagnesemia     Disposition   As a result of the work-up, the patient was discharged home.  she was informed of her diagnosis and instructed to come back with any concerns or worsening of condition.   she and was agreeable to the plan as discussed above.  she was given the opportunity to ask questions.  All of the patient's questions were answered.    Procedures   Procedures    Patient seen and discussed with ED attending physician.    Kimmy Voss MD  Emergency Medicine     Kimmy Voss MD  Resident  02/09/25 0752

## 2025-02-07 NOTE — DISCHARGE INSTRUCTIONS
Please follow up with your primary care physician. Please return if any new or worsening symptoms taken medications as prescribed

## 2025-02-07 NOTE — ED TRIAGE NOTES
"Pt presents to ED for report of generalized weakness. 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 At the time of triage pt is talking to herself or appears to be talking to a voice in her head. Pt redirectable after multiple attempts of asking why pt is at the hospital.   "

## 2025-02-10 LAB
ATRIAL RATE: 60 BPM
P AXIS: 63 DEGREES
P OFFSET: 183 MS
P ONSET: 152 MS
PR INTERVAL: 140 MS
Q ONSET: 222 MS
QRS COUNT: 10 BEATS
QRS DURATION: 82 MS
QT INTERVAL: 452 MS
QTC CALCULATION(BAZETT): 452 MS
QTC FREDERICIA: 452 MS
R AXIS: 70 DEGREES
T AXIS: 61 DEGREES
T OFFSET: 448 MS
VENTRICULAR RATE: 60 BPM

## 2025-02-12 ENCOUNTER — HOSPITAL ENCOUNTER (EMERGENCY)
Facility: HOSPITAL | Age: 50
Discharge: HOME | End: 2025-02-12
Attending: EMERGENCY MEDICINE
Payer: COMMERCIAL

## 2025-02-12 VITALS
OXYGEN SATURATION: 97 % | BODY MASS INDEX: 19.09 KG/M2 | WEIGHT: 118.8 LBS | SYSTOLIC BLOOD PRESSURE: 95 MMHG | HEART RATE: 104 BPM | TEMPERATURE: 98.8 F | DIASTOLIC BLOOD PRESSURE: 53 MMHG | HEIGHT: 66 IN | RESPIRATION RATE: 20 BRPM

## 2025-02-12 DIAGNOSIS — E83.51 HYPOCALCEMIA: ICD-10-CM

## 2025-02-12 DIAGNOSIS — R53.1 GENERALIZED WEAKNESS: ICD-10-CM

## 2025-02-12 DIAGNOSIS — F20.0 PARANOID SCHIZOPHRENIA (MULTI): ICD-10-CM

## 2025-02-12 DIAGNOSIS — E27.1 ADDISON'S DISEASE (MULTI): ICD-10-CM

## 2025-02-12 LAB
ALBUMIN SERPL BCP-MCNC: 3.9 G/DL (ref 3.4–5)
ALP SERPL-CCNC: 52 U/L (ref 33–110)
ALT SERPL W P-5'-P-CCNC: 8 U/L (ref 7–45)
ANION GAP SERPL CALC-SCNC: 13 MMOL/L (ref 10–20)
AST SERPL W P-5'-P-CCNC: 15 U/L (ref 9–39)
BASOPHILS # BLD AUTO: 0.01 X10*3/UL (ref 0–0.1)
BASOPHILS NFR BLD AUTO: 0.1 %
BILIRUB SERPL-MCNC: 0.4 MG/DL (ref 0–1.2)
BUN SERPL-MCNC: 14 MG/DL (ref 6–23)
CA-I BLD-SCNC: 0.86 MMOL/L (ref 1.1–1.33)
CALCIUM SERPL-MCNC: 7.4 MG/DL (ref 8.6–10.3)
CHLORIDE SERPL-SCNC: 103 MMOL/L (ref 98–107)
CO2 SERPL-SCNC: 29 MMOL/L (ref 21–32)
CREAT SERPL-MCNC: 0.83 MG/DL (ref 0.5–1.05)
EGFRCR SERPLBLD CKD-EPI 2021: 88 ML/MIN/1.73M*2
EOSINOPHIL # BLD AUTO: 0.01 X10*3/UL (ref 0–0.7)
EOSINOPHIL NFR BLD AUTO: 0.1 %
ERYTHROCYTE [DISTWIDTH] IN BLOOD BY AUTOMATED COUNT: 13.2 % (ref 11.5–14.5)
GLUCOSE SERPL-MCNC: 143 MG/DL (ref 74–99)
HCT VFR BLD AUTO: 36.5 % (ref 36–46)
HGB BLD-MCNC: 11.9 G/DL (ref 12–16)
IMM GRANULOCYTES # BLD AUTO: 0.03 X10*3/UL (ref 0–0.7)
IMM GRANULOCYTES NFR BLD AUTO: 0.3 % (ref 0–0.9)
LYMPHOCYTES # BLD AUTO: 2.1 X10*3/UL (ref 1.2–4.8)
LYMPHOCYTES NFR BLD AUTO: 22.6 %
MAGNESIUM SERPL-MCNC: 1.3 MG/DL (ref 1.6–2.4)
MCH RBC QN AUTO: 29 PG (ref 26–34)
MCHC RBC AUTO-ENTMCNC: 32.6 G/DL (ref 32–36)
MCV RBC AUTO: 89 FL (ref 80–100)
MONOCYTES # BLD AUTO: 0.75 X10*3/UL (ref 0.1–1)
MONOCYTES NFR BLD AUTO: 8.1 %
NEUTROPHILS # BLD AUTO: 6.4 X10*3/UL (ref 1.2–7.7)
NEUTROPHILS NFR BLD AUTO: 68.8 %
NRBC BLD-RTO: 0 /100 WBCS (ref 0–0)
PHOSPHATE SERPL-MCNC: 5.1 MG/DL (ref 2.5–4.9)
PLATELET # BLD AUTO: 264 X10*3/UL (ref 150–450)
POTASSIUM SERPL-SCNC: 3.1 MMOL/L (ref 3.5–5.3)
PROT SERPL-MCNC: 6.7 G/DL (ref 6.4–8.2)
RBC # BLD AUTO: 4.11 X10*6/UL (ref 4–5.2)
SODIUM SERPL-SCNC: 142 MMOL/L (ref 136–145)
TSH SERPL-ACNC: 1 MIU/L (ref 0.44–3.98)
WBC # BLD AUTO: 9.3 X10*3/UL (ref 4.4–11.3)

## 2025-02-12 PROCEDURE — 84443 ASSAY THYROID STIM HORMONE: CPT | Performed by: HEALTH CARE PROVIDER

## 2025-02-12 PROCEDURE — 83735 ASSAY OF MAGNESIUM: CPT | Performed by: HEALTH CARE PROVIDER

## 2025-02-12 PROCEDURE — 82330 ASSAY OF CALCIUM: CPT | Performed by: HEALTH CARE PROVIDER

## 2025-02-12 PROCEDURE — 2500000001 HC RX 250 WO HCPCS SELF ADMINISTERED DRUGS (ALT 637 FOR MEDICARE OP): Performed by: EMERGENCY MEDICINE

## 2025-02-12 PROCEDURE — 84100 ASSAY OF PHOSPHORUS: CPT | Performed by: HEALTH CARE PROVIDER

## 2025-02-12 PROCEDURE — 85025 COMPLETE CBC W/AUTO DIFF WBC: CPT | Performed by: HEALTH CARE PROVIDER

## 2025-02-12 PROCEDURE — 2500000004 HC RX 250 GENERAL PHARMACY W/ HCPCS (ALT 636 FOR OP/ED): Performed by: EMERGENCY MEDICINE

## 2025-02-12 PROCEDURE — 36415 COLL VENOUS BLD VENIPUNCTURE: CPT | Performed by: HEALTH CARE PROVIDER

## 2025-02-12 PROCEDURE — 99283 EMERGENCY DEPT VISIT LOW MDM: CPT | Performed by: EMERGENCY MEDICINE

## 2025-02-12 PROCEDURE — 80053 COMPREHEN METABOLIC PANEL: CPT | Performed by: HEALTH CARE PROVIDER

## 2025-02-12 PROCEDURE — 2500000002 HC RX 250 W HCPCS SELF ADMINISTERED DRUGS (ALT 637 FOR MEDICARE OP, ALT 636 FOR OP/ED): Performed by: EMERGENCY MEDICINE

## 2025-02-12 RX ORDER — HYDROCORTISONE 10 MG/1
15 TABLET ORAL EVERY MORNING
Qty: 45 TABLET | Refills: 0 | Status: SHIPPED | OUTPATIENT
Start: 2025-02-12 | End: 2025-03-14

## 2025-02-12 RX ORDER — LANOLIN ALCOHOL/MO/W.PET/CERES
400 CREAM (GRAM) TOPICAL DAILY
Qty: 30 TABLET | Refills: 0 | Status: SHIPPED | OUTPATIENT
Start: 2025-02-12 | End: 2025-02-12

## 2025-02-12 RX ORDER — RESVER/WINE/BFL/GRPSD/PC/C/POM 200MG-60MG
5000 CAPSULE ORAL DAILY
Qty: 30 TABLET | Refills: 0 | OUTPATIENT
Start: 2025-02-12 | End: 2025-02-12

## 2025-02-12 RX ORDER — CALCITRIOL 0.25 UG/1
0.5 CAPSULE ORAL ONCE
Status: COMPLETED | OUTPATIENT
Start: 2025-02-12 | End: 2025-02-12

## 2025-02-12 RX ORDER — HYDROCORTISONE 10 MG/1
15 TABLET ORAL EVERY MORNING
Qty: 45 TABLET | Refills: 0 | Status: SHIPPED | OUTPATIENT
Start: 2025-02-12 | End: 2025-02-12

## 2025-02-12 RX ORDER — HYDROCORTISONE 5 MG/1
5 TABLET ORAL ONCE
Status: COMPLETED | OUTPATIENT
Start: 2025-02-12 | End: 2025-02-12

## 2025-02-12 RX ORDER — CALCITRIOL 0.25 UG/1
0.25 CAPSULE ORAL DAILY
Qty: 30 CAPSULE | Refills: 0 | Status: SHIPPED | OUTPATIENT
Start: 2025-02-12 | End: 2026-02-12

## 2025-02-12 RX ORDER — HYDROCORTISONE 5 MG/1
5 TABLET ORAL
Qty: 30 TABLET | Refills: 0 | Status: SHIPPED | OUTPATIENT
Start: 2025-02-12 | End: 2025-02-12

## 2025-02-12 RX ORDER — QUETIAPINE FUMARATE 25 MG/1
25 TABLET, FILM COATED ORAL NIGHTLY
Status: DISCONTINUED | OUTPATIENT
Start: 2025-02-12 | End: 2025-02-12

## 2025-02-12 RX ORDER — QUETIAPINE FUMARATE 25 MG/1
25 TABLET, FILM COATED ORAL ONCE
Status: COMPLETED | OUTPATIENT
Start: 2025-02-12 | End: 2025-02-12

## 2025-02-12 RX ORDER — CALCIUM CARBONATE 500(1250)
1250 TABLET ORAL
Qty: 90 TABLET | Refills: 0 | Status: SHIPPED | OUTPATIENT
Start: 2025-02-12 | End: 2025-03-14

## 2025-02-12 RX ORDER — FLUDROCORTISONE ACETATE 0.1 MG/1
0.1 TABLET ORAL DAILY
Status: DISCONTINUED | OUTPATIENT
Start: 2025-02-12 | End: 2025-02-12

## 2025-02-12 RX ORDER — CALCIUM CARBONATE 500(1250)
1250 TABLET ORAL
Status: DISCONTINUED | OUTPATIENT
Start: 2025-02-13 | End: 2025-02-12

## 2025-02-12 RX ORDER — LANOLIN ALCOHOL/MO/W.PET/CERES
400 CREAM (GRAM) TOPICAL ONCE
Status: COMPLETED | OUTPATIENT
Start: 2025-02-12 | End: 2025-02-12

## 2025-02-12 RX ORDER — LANOLIN ALCOHOL/MO/W.PET/CERES
400 CREAM (GRAM) TOPICAL DAILY
Status: DISCONTINUED | OUTPATIENT
Start: 2025-02-12 | End: 2025-02-12

## 2025-02-12 RX ORDER — QUETIAPINE FUMARATE 25 MG/1
25 TABLET, FILM COATED ORAL NIGHTLY
Qty: 30 TABLET | Refills: 0 | Status: SHIPPED | OUTPATIENT
Start: 2025-02-12

## 2025-02-12 RX ORDER — FLUDROCORTISONE ACETATE 0.1 MG/1
0.1 TABLET ORAL ONCE
Status: COMPLETED | OUTPATIENT
Start: 2025-02-12 | End: 2025-02-12

## 2025-02-12 RX ORDER — POTASSIUM CHLORIDE 1.5 G/1.58G
20 POWDER, FOR SOLUTION ORAL ONCE
Status: COMPLETED | OUTPATIENT
Start: 2025-02-12 | End: 2025-02-12

## 2025-02-12 RX ORDER — RESVER/WINE/BFL/GRPSD/PC/C/POM 200MG-60MG
5000 CAPSULE ORAL DAILY
Qty: 30 TABLET | Refills: 0 | Status: SHIPPED | OUTPATIENT
Start: 2025-02-12

## 2025-02-12 RX ORDER — CALCIUM CARBONATE 500(1250)
1250 TABLET ORAL ONCE
Status: COMPLETED | OUTPATIENT
Start: 2025-02-12 | End: 2025-02-12

## 2025-02-12 RX ORDER — HYDROCORTISONE 5 MG/1
5 TABLET ORAL
Qty: 30 TABLET | Refills: 0 | Status: SHIPPED | OUTPATIENT
Start: 2025-02-12

## 2025-02-12 RX ORDER — FLUDROCORTISONE ACETATE 0.1 MG/1
0.1 TABLET ORAL DAILY
Qty: 30 TABLET | Refills: 0 | Status: SHIPPED | OUTPATIENT
Start: 2025-02-12

## 2025-02-12 RX ORDER — LANOLIN ALCOHOL/MO/W.PET/CERES
400 CREAM (GRAM) TOPICAL DAILY
Qty: 30 TABLET | Refills: 0 | Status: SHIPPED | OUTPATIENT
Start: 2025-02-12

## 2025-02-12 RX ORDER — QUETIAPINE FUMARATE 25 MG/1
25 TABLET, FILM COATED ORAL NIGHTLY
Qty: 30 TABLET | Refills: 0 | Status: SHIPPED | OUTPATIENT
Start: 2025-02-12 | End: 2025-02-12

## 2025-02-12 RX ORDER — CALCITRIOL 0.25 UG/1
0.25 CAPSULE ORAL DAILY
Qty: 30 CAPSULE | Refills: 0 | Status: SHIPPED | OUTPATIENT
Start: 2025-02-12 | End: 2025-02-12

## 2025-02-12 RX ORDER — FLUDROCORTISONE ACETATE 0.1 MG/1
0.1 TABLET ORAL DAILY
Qty: 30 TABLET | Refills: 0 | Status: SHIPPED | OUTPATIENT
Start: 2025-02-12 | End: 2025-02-12

## 2025-02-12 RX ORDER — CALCIUM CARBONATE 500(1250)
1250 TABLET ORAL
Qty: 90 TABLET | Refills: 0 | Status: SHIPPED | OUTPATIENT
Start: 2025-02-12 | End: 2025-02-12

## 2025-02-12 RX ORDER — CHOLECALCIFEROL (VITAMIN D3) 25 MCG
5000 TABLET ORAL ONCE
Status: COMPLETED | OUTPATIENT
Start: 2025-02-12 | End: 2025-02-12

## 2025-02-12 RX ORDER — CALCITRIOL 0.25 UG/1
0.5 CAPSULE ORAL DAILY
Status: DISCONTINUED | OUTPATIENT
Start: 2025-02-12 | End: 2025-02-12

## 2025-02-12 RX ADMIN — Medication 5000 UNITS: at 19:41

## 2025-02-12 RX ADMIN — CALCIUM 1250 MG: 500 TABLET ORAL at 19:42

## 2025-02-12 RX ADMIN — FLUDROCORTISONE ACETATE 0.1 MG: 0.1 TABLET ORAL at 19:42

## 2025-02-12 RX ADMIN — HYDROCORTISONE 5 MG: 5 TABLET ORAL at 19:42

## 2025-02-12 RX ADMIN — MAGNESIUM OXIDE TAB 400 MG (241.3 MG ELEMENTAL MG) 400 MG: 400 (241.3 MG) TAB at 19:42

## 2025-02-12 RX ADMIN — CALCITRIOL CAPSULES 0.25 MCG 0.5 MCG: 0.25 CAPSULE ORAL at 19:42

## 2025-02-12 RX ADMIN — POTASSIUM CHLORIDE 20 MEQ: 1.5 POWDER, FOR SOLUTION ORAL at 19:42

## 2025-02-12 RX ADMIN — QUETIAPINE FUMARATE 25 MG: 25 TABLET ORAL at 19:42

## 2025-02-12 NOTE — ED TRIAGE NOTES
"HPI:  49 y.o. female with Pmhx of [place]'s Ds, Hypothyroid, Hypocalcemia, Schachenmann's Sx, Schizophrenia presents to the emergency room stating that she feels \"sick\" patient was seen in the ED on 2/7/2025 at that time laboratory workup showed hypocalcemia and hypomagnesia both were repleted p.o., and she was sent prescriptions to her pharmacy however patient stated that she called the pharmacy and there was no prescriptions in their, states that she took her Cortef yesterday, she states that she has been compliant with her Seroquel and her Depakote, she appears to be at her normal baseline mentation.  Denies any nausea vomiting diarrhea, denies having any chest pain, shortness of breath, denies having any fever, chills.        General: Vitals noted, no distress. Afebrile. Alert and oriented  x 3.     EENT: TMs clear. Posterior oropharynx unremarkable. No meningismus. No LAD.     Cardiac: Tachy, rate, rhythm, no murmurs rubs or gallops.     Pulmonary: Lungs clear bilaterally with good aeration. No adventitious breath sounds. No wheezes rales or rhonchi.     Abdomen: Soft, nonsurgical. Nontender. No peritoneal signs. Normoactive bowel sounds. No pulsatile masses.     Extremities: No peripheral edema. Negative Homans bilaterally, no cords.    Skin: No rash. Intact.     Neuro: No focal neurologic deficits, NIH score of 0. Cranial nerves normal as tested from II through XII.     "

## 2025-02-12 NOTE — ED TRIAGE NOTES
Patient arrives from home with complaints of generalized weakness and shortness of breath that has been ongoing for the past 3 days or so.  Patient states she has a history of addisons disease and ran out of her medications.

## 2025-02-13 LAB
ATRIAL RATE: 64 BPM
P AXIS: 60 DEGREES
P OFFSET: 182 MS
P ONSET: 154 MS
PR INTERVAL: 134 MS
Q ONSET: 221 MS
QRS COUNT: 11 BEATS
QRS DURATION: 72 MS
QT INTERVAL: 414 MS
QTC CALCULATION(BAZETT): 427 MS
QTC FREDERICIA: 422 MS
R AXIS: 70 DEGREES
T AXIS: 58 DEGREES
T OFFSET: 428 MS
VENTRICULAR RATE: 64 BPM

## 2025-02-13 NOTE — ED PROVIDER NOTES
Emergency Department Provider Note        History of Present Illness     History provided by: Patient  Limitations to History: None  External Records Reviewed with Brief Summary:  Recent ED visit    HPI:  Kriss Malloy is a 49 y.o. female with history of Melchor's disease, bipolar presents to the ED with concerns for medication refills.  She was recently seen in the ED for electrolyte abnormalities and prescription refills.  She reports she tried to go to the pharmacy but was unable to provide prescriptions.  She has been having chronic fatigue.  Denies any    Physical Exam   Triage vitals:  T 37.1 °C (98.8 °F)  HR (!) 104  BP 95/53  RR 20  O2 97 %      General: Awake, alert, in no acute distress  Eyes: Gaze conjugate.  No scleral icterus or injection  HENT: Normo-cephalic, atraumatic. No stridor  CV: Regular rate, regular rhythm. Radial pulses 2+ bilaterally  Resp: Breathing non-labored, speaking in full sentences.  Clear to auscultation bilaterally  GI: Soft, non-distended, non-tender. No rebound or guarding.  : Deferred  MSK/Extremities: No gross bony deformities. Moving all extremities  Skin: Warm. Appropriate color  Neuro: Alert. Oriented. Face symmetric. Speech is fluent.  Gross strength and sensation intact in b/l UE and LEs  Psych: Appropriate mood and affect    Medical Decision Making & ED Course   Medical Decision Makin y.o. female presents to the ED for medication refills.  She had labs obtained which shows chronic electrolyte abnormalities.  She was given p.o. meds in the ED including potassium, magnesium.  She was given a written prescription for her prescriptions.  All questions were answered.  ----      Differential diagnoses considered include but are not limited to: N/A     Social Determinants of Health which Significantly Impact Care: None identified     EKG Independent Interpretation: EKG not obtained    Independent Result Review and Interpretation: Relevant laboratory and  radiographic results were reviewed and independently interpreted by myself.  As necessary, they are commented on in the ED Course.    Chronic conditions affecting the patient's care: As documented above in [facility]    The patient was discussed with the following consultants/services: None    Care Considerations: As documented above in [facility]    ED Course:  Diagnoses as of 02/12/25 1949   [place]'s disease (Multi)     Disposition   As a result of the work-up, the patient was discharged home.  she was informed of her diagnosis and instructed to come back with any concerns or worsening of condition.  she and was agreeable to the plan as discussed above.  she was given the opportunity to ask questions.  All of the patient's questions were answered.    Procedures   Procedures    Patient was seen independently    Andre Vasquez MD  Emergency Medicine     Andre Vasquez MD  02/15/25 [id number]

## 2025-02-22 ENCOUNTER — APPOINTMENT (OUTPATIENT)
Dept: CARDIOLOGY | Facility: HOSPITAL | Age: 50
End: 2025-02-22
Payer: COMMERCIAL

## 2025-02-22 ENCOUNTER — APPOINTMENT (OUTPATIENT)
Dept: RADIOLOGY | Facility: HOSPITAL | Age: 50
End: 2025-02-22
Payer: COMMERCIAL

## 2025-02-22 ENCOUNTER — HOSPITAL ENCOUNTER (EMERGENCY)
Facility: HOSPITAL | Age: 50
Discharge: HOME | End: 2025-02-22
Attending: STUDENT IN AN ORGANIZED HEALTH CARE EDUCATION/TRAINING PROGRAM
Payer: COMMERCIAL

## 2025-02-22 VITALS
BODY MASS INDEX: 18.8 KG/M2 | RESPIRATION RATE: 16 BRPM | SYSTOLIC BLOOD PRESSURE: 89 MMHG | HEIGHT: 66 IN | DIASTOLIC BLOOD PRESSURE: 56 MMHG | OXYGEN SATURATION: 97 % | TEMPERATURE: 98.1 F | HEART RATE: 95 BPM | WEIGHT: 117 LBS

## 2025-02-22 DIAGNOSIS — E27.1 ADDISON DISEASE (MULTI): ICD-10-CM

## 2025-02-22 DIAGNOSIS — Z76.0 MEDICATION REFILL: Primary | ICD-10-CM

## 2025-02-22 LAB
ALBUMIN SERPL BCP-MCNC: 3.8 G/DL (ref 3.4–5)
ALP SERPL-CCNC: 54 U/L (ref 33–110)
ALT SERPL W P-5'-P-CCNC: 7 U/L (ref 7–45)
ANION GAP SERPL CALC-SCNC: 11 MMOL/L (ref 10–20)
AST SERPL W P-5'-P-CCNC: 15 U/L (ref 9–39)
ATRIAL RATE: 67 BPM
BASOPHILS # BLD AUTO: 0.01 X10*3/UL (ref 0–0.1)
BASOPHILS NFR BLD AUTO: 0.1 %
BILIRUB SERPL-MCNC: 0.5 MG/DL (ref 0–1.2)
BUN SERPL-MCNC: 10 MG/DL (ref 6–23)
CA-I BLD-SCNC: 0.8 MMOL/L (ref 1.1–1.33)
CALCIUM SERPL-MCNC: 6.6 MG/DL (ref 8.6–10.3)
CHLORIDE SERPL-SCNC: 101 MMOL/L (ref 98–107)
CO2 SERPL-SCNC: 30 MMOL/L (ref 21–32)
CREAT SERPL-MCNC: 0.8 MG/DL (ref 0.5–1.05)
EGFRCR SERPLBLD CKD-EPI 2021: >90 ML/MIN/1.73M*2
EOSINOPHIL # BLD AUTO: 0 X10*3/UL (ref 0–0.7)
EOSINOPHIL NFR BLD AUTO: 0 %
ERYTHROCYTE [DISTWIDTH] IN BLOOD BY AUTOMATED COUNT: 13 % (ref 11.5–14.5)
GLUCOSE SERPL-MCNC: 83 MG/DL (ref 74–99)
HCT VFR BLD AUTO: 35.5 % (ref 36–46)
HGB BLD-MCNC: 12.1 G/DL (ref 12–16)
HOLD SPECIMEN: NORMAL
IMM GRANULOCYTES # BLD AUTO: 0.03 X10*3/UL (ref 0–0.7)
IMM GRANULOCYTES NFR BLD AUTO: 0.4 % (ref 0–0.9)
LYMPHOCYTES # BLD AUTO: 2.17 X10*3/UL (ref 1.2–4.8)
LYMPHOCYTES NFR BLD AUTO: 29.3 %
MAGNESIUM SERPL-MCNC: 1.24 MG/DL (ref 1.6–2.4)
MCH RBC QN AUTO: 29.5 PG (ref 26–34)
MCHC RBC AUTO-ENTMCNC: 34.1 G/DL (ref 32–36)
MCV RBC AUTO: 87 FL (ref 80–100)
MONOCYTES # BLD AUTO: 0.58 X10*3/UL (ref 0.1–1)
MONOCYTES NFR BLD AUTO: 7.8 %
NEUTROPHILS # BLD AUTO: 4.62 X10*3/UL (ref 1.2–7.7)
NEUTROPHILS NFR BLD AUTO: 62.4 %
NRBC BLD-RTO: 0 /100 WBCS (ref 0–0)
P AXIS: 19 DEGREES
P OFFSET: 185 MS
P ONSET: 148 MS
PHOSPHATE SERPL-MCNC: 4.7 MG/DL (ref 2.5–4.9)
PLATELET # BLD AUTO: 296 X10*3/UL (ref 150–450)
POTASSIUM SERPL-SCNC: 3.2 MMOL/L (ref 3.5–5.3)
PR INTERVAL: 148 MS
PROT SERPL-MCNC: 6.4 G/DL (ref 6.4–8.2)
Q ONSET: 222 MS
QRS COUNT: 11 BEATS
QRS DURATION: 78 MS
QT INTERVAL: 420 MS
QTC CALCULATION(BAZETT): 443 MS
QTC FREDERICIA: 436 MS
R AXIS: 42 DEGREES
RBC # BLD AUTO: 4.1 X10*6/UL (ref 4–5.2)
SODIUM SERPL-SCNC: 139 MMOL/L (ref 136–145)
T AXIS: 48 DEGREES
T OFFSET: 432 MS
TSH SERPL-ACNC: 1.96 MIU/L (ref 0.44–3.98)
VENTRICULAR RATE: 67 BPM
WBC # BLD AUTO: 7.4 X10*3/UL (ref 4.4–11.3)

## 2025-02-22 PROCEDURE — 2500000002 HC RX 250 W HCPCS SELF ADMINISTERED DRUGS (ALT 637 FOR MEDICARE OP, ALT 636 FOR OP/ED)

## 2025-02-22 PROCEDURE — 2500000004 HC RX 250 GENERAL PHARMACY W/ HCPCS (ALT 636 FOR OP/ED)

## 2025-02-22 PROCEDURE — 2500000001 HC RX 250 WO HCPCS SELF ADMINISTERED DRUGS (ALT 637 FOR MEDICARE OP)

## 2025-02-22 PROCEDURE — 93005 ELECTROCARDIOGRAM TRACING: CPT

## 2025-02-22 PROCEDURE — 85025 COMPLETE CBC W/AUTO DIFF WBC: CPT

## 2025-02-22 PROCEDURE — 71046 X-RAY EXAM CHEST 2 VIEWS: CPT

## 2025-02-22 PROCEDURE — 83735 ASSAY OF MAGNESIUM: CPT

## 2025-02-22 PROCEDURE — 71046 X-RAY EXAM CHEST 2 VIEWS: CPT | Performed by: RADIOLOGY

## 2025-02-22 PROCEDURE — 84443 ASSAY THYROID STIM HORMONE: CPT

## 2025-02-22 PROCEDURE — 80053 COMPREHEN METABOLIC PANEL: CPT

## 2025-02-22 PROCEDURE — 36415 COLL VENOUS BLD VENIPUNCTURE: CPT

## 2025-02-22 PROCEDURE — 82330 ASSAY OF CALCIUM: CPT

## 2025-02-22 PROCEDURE — 99285 EMERGENCY DEPT VISIT HI MDM: CPT | Mod: 25 | Performed by: STUDENT IN AN ORGANIZED HEALTH CARE EDUCATION/TRAINING PROGRAM

## 2025-02-22 PROCEDURE — 84100 ASSAY OF PHOSPHORUS: CPT

## 2025-02-22 RX ORDER — CALCIUM GLUCONATE 20 MG/ML
1 INJECTION, SOLUTION INTRAVENOUS ONCE
Status: DISCONTINUED | OUTPATIENT
Start: 2025-02-22 | End: 2025-02-22 | Stop reason: HOSPADM

## 2025-02-22 RX ORDER — LANOLIN ALCOHOL/MO/W.PET/CERES
400 CREAM (GRAM) TOPICAL DAILY
Status: DISCONTINUED | OUTPATIENT
Start: 2025-02-22 | End: 2025-02-22 | Stop reason: HOSPADM

## 2025-02-22 RX ORDER — POTASSIUM CHLORIDE 20 MEQ/1
40 TABLET, EXTENDED RELEASE ORAL ONCE
Status: COMPLETED | OUTPATIENT
Start: 2025-02-22 | End: 2025-02-22

## 2025-02-22 RX ORDER — CALCITRIOL 0.25 UG/1
0.25 CAPSULE ORAL ONCE
Status: COMPLETED | OUTPATIENT
Start: 2025-02-22 | End: 2025-02-22

## 2025-02-22 RX ADMIN — POTASSIUM CHLORIDE 40 MEQ: 1500 TABLET, EXTENDED RELEASE ORAL at 16:14

## 2025-02-22 RX ADMIN — CALCITRIOL CAPSULES 0.25 MCG 0.25 MCG: 0.25 CAPSULE ORAL at 14:55

## 2025-02-22 RX ADMIN — MAGNESIUM OXIDE TAB 400 MG (241.3 MG ELEMENTAL MG) 400 MG: 400 (241.3 MG) TAB at 16:14

## 2025-02-22 ASSESSMENT — LIFESTYLE VARIABLES
TOTAL SCORE: 0
EVER HAD A DRINK FIRST THING IN THE MORNING TO STEADY YOUR NERVES TO GET RID OF A HANGOVER: NO
HAVE YOU EVER FELT YOU SHOULD CUT DOWN ON YOUR DRINKING: NO
EVER FELT BAD OR GUILTY ABOUT YOUR DRINKING: NO
HAVE PEOPLE ANNOYED YOU BY CRITICIZING YOUR DRINKING: NO

## 2025-02-22 ASSESSMENT — COLUMBIA-SUICIDE SEVERITY RATING SCALE - C-SSRS
1. IN THE PAST MONTH, HAVE YOU WISHED YOU WERE DEAD OR WISHED YOU COULD GO TO SLEEP AND NOT WAKE UP?: NO
6. HAVE YOU EVER DONE ANYTHING, STARTED TO DO ANYTHING, OR PREPARED TO DO ANYTHING TO END YOUR LIFE?: NO
2. HAVE YOU ACTUALLY HAD ANY THOUGHTS OF KILLING YOURSELF?: NO

## 2025-02-22 ASSESSMENT — PAIN - FUNCTIONAL ASSESSMENT: PAIN_FUNCTIONAL_ASSESSMENT: 0-10

## 2025-02-22 ASSESSMENT — PAIN SCALES - GENERAL: PAINLEVEL_OUTOF10: 0 - NO PAIN

## 2025-02-22 NOTE — PROGRESS NOTES
TCC Note: Received message from EMT in ER asking if the SW can please call MD in Room 3 of the ED regarding this pt. Informed EMT that we do not have a SW on today and asked what the problem was so we could try to assess. Was informed by EMT that the pt (She) said (she was having issues with) Insurance for medications. She has Addisons and hasn't taken her meds in 3 weeks and keeps coming back to the ER for this same issue. Suggested the EMT check with our Pharmacy to see if they had any coupons or Good RX, Singlecare and stated that I would see if we had a remote SW available. Informed them that I knew that one would be available tomorrow. Roberta Silverio, MSN, RN, TCC.

## 2025-02-22 NOTE — ED TRIAGE NOTES
PT.  WAS RECENTLY SEEN FOR ALEXANDER'S DISEASE, [facility] INSURANCE WON'T COVER ONE OF HER MEDICATIONS. PT. C/O FEELING FATIGUE AND SOB, STATES DOES NOT KNOW FOR HOW LONG. DENIES PAIN.

## 2025-02-22 NOTE — ED PROVIDER NOTES
History of Present Illness     History provided by: Patient  Limitations to History: None  External Records Reviewed with Brief Summary:  ED notes    HPI:  Kriss Malloy is a 49 y.o. female with history of King's disease, bipolar presents to the ED with concerns for prescription refills.  Patient states that she has not taken her Calcitrol for necessities for the past 3 weeks.  Patient states that she is continuing to have some shortness of breath, fatigue, myalgias.  Patient states that the shortness of breath is consistent is not significantly exacerbated exertion.  Patient denies any fevers, chills, nausea, vomiting, dysuria, hematuria.  Does not point of any pain anywhere else.  Patient recently tried to call the insurance and pharmacy states that this medication will not be covered by insurance.  Patient states that she was seen on 2025 similar symptoms and states that she does not feel any different than last time.  Patient states that she has had no sick contacts.    Physical Exam   Triage vitals:  T 36.7 °C (98.1 °F)  HR 95  BP 89/56  RR 16  O2 97 % None (Room air)    General: Awake, alert, in no acute distress, thin appearing  Eyes: Gaze conjugate.  No scleral icterus or injection  HENT: Normo-cephalic, atraumatic. No stridor  CV: Regular rate, regular rhythm. Radial pulses 2+ bilaterally  Resp: Breathing non-labored, speaking in full sentences.  Clear to auscultation bilaterally  GI: Soft, non-distended, non-tender. No rebound or guarding.  : Deferred  MSK/Extremities: No gross bony deformities. Moving all extremities  Skin: Warm. Appropriate color  Neuro: Alert. Oriented. Face symmetric. Speech is fluent.  Gross strength and sensation intact in b/l UE and LEs  Psych: Appropriate mood and affect    Medical Decision Making & ED Course   Medical Decision Makin y.o. female with history of King's disease, bipolar presents to the ED with concerns for prescription refills. Patient  presents vitally stable, no acute distress, nontoxic-appearing.  Will evaluate her for adrenal crisis.  However patient does not have any significant hypotension, abdominal tenderness, afebrile, is not confused.  Will also obtain some basic labs and EKG given that she has had history of chronic electrolyte abnormalities.  Will also obtain chest x-ray for concerns of shortness of breath however I have low suspicion for any pneumonia, pneumothorax or any other cardiac pathologies.  Additionally patient is satting well on room air at 97%.  Updates can be seen in ED course.        ----  Scoring Tools Utilized: None     Social Determinants of Health which Significantly Impact Care: None identified The following actions were taken to address these social determinants: Patient offered evaluation by Social Work    EKG Independent Interpretation: EKG interpreted by myself. Please see ED Course for full interpretation.    Independent Result Review and Interpretation: Relevant laboratory and radiographic results were reviewed and independently interpreted by myself.  As necessary, they are commented on in the ED Course.    Chronic conditions affecting the patient's care: As documented above in [facility]    The patient was discussed with the following consultants/services: None    Care Considerations: As documented above in [facility]    ED Course:  ED Course as of 02/22/25 2011   Sat Feb 22, 2025   1407 Chest x-ray shows no acute cardiopulmonary process. [YG]   1405 Emergency Medicine Attending Attestation:     [unfilled]    The patient was seen by the resident/fellow.  I have personally performed a substantive portion of the encounter.  I have seen and examined the patient; agree with the workup, evaluation, MDM, management and diagnosis.  The care plan has been discussed with the resident; I have reviewed the resident's note and agree with the documented findings.      Patient with past medical history of Melchor's disease who presents  the emergency department for generalized weakness, shortness of breath and fatigue.  Patient states that she has been taking her steroids but has been unable to take her calcitriol.  She states that whenever she is unable to take one of her meds, she feels crummy.  She denies any chest pain, abdominal pain, nausea/vomiting or diarrhea.  She denies any lower extremity edema.  On exam, patient is resting comfortably in the gurney no acute distress.  She has symmetric chest rise, clear breath sounds bilaterally.  She has a regular heart rate.  She has a soft benign abdominal exam.  She is alert and orient x 4 moves all 4 extremities.  Will evaluate for any concern for adrenal crisis and treat her symptoms as needed.    I independently interpreted patient's EKG and agree with the above mentioned interpretation.      Yadiel Chahal MD   [AM]   1529 EKG shows normal sinus rhythm, no axis deviation, QTc prolonged at 500, no ST ovation's, no depressions, no T wave inversions. [YG]   1530 Chest x-ray shows no acute cardiopulmonary process. [YG]   1530 CBC shows no leukocytosis, no anemia.  Ionized calcium at 0.8 and given calcium chloride of 1 g.  Magnesium slightly decreased at 1.24 and replenished with 400 mg of magnesium oxide.  CMP showed no electrolyte abnormality with exception for hypokalemia 3.2 hide given replacement.  Phosphorus is within normal limits.  [YG]   1620 TSH is within normal limits.  I ordered calcium gluconate for possible decrease ionized calcium however patient wanted the IV placed on her hand.  I spoke with her multiple times about this may cause vascular necrosis, pain and burning.  Patient states that she wanted the forearm.  Patient has stated that she does not want this medication.  I had a conversation about the risks of cardiac arrhythmias with not getting the calcium gluconate.  Patient notes that he is not altered.  [YG]   2009 We did also speak to the  who went to evaluate  whether or not patient had prescriptions and whether or not they were covered with insurance.  Social work was not here during the weekend therefore he spoke to .   states that patient has her prescriptions however has not been able to cover the cost.  We tried to look up the Rx codes for her and she has done this multiple times with other individuals however were unable to get her her medications completely free of charge.  Patient was given discharge for mental to follow-up with her primary care provider. [YG]      ED Course User Index  [AM] Yadiel Chahal MD  [YG] Abi Costa MD         Diagnoses as of 02/22/25 2011   Medication refill   [place] disease (Multi)     Disposition   As a result of the work-up, the patient was discharged home.  she was informed of her diagnosis and instructed to come back with any concerns or worsening of condition.  she and was agreeable to the plan as discussed above.  she was given the opportunity to ask questions.  All of the patient's questions were answered.    Procedures   Procedures    Patient seen and discussed with ED attending physician.    Abi Costa MD  Emergency Medicine     Abi Costa MD  Resident  02/22/25 2011

## 2025-02-28 LAB
ATRIAL RATE: 83 BPM
P AXIS: 70 DEGREES
P OFFSET: 189 MS
P ONSET: 152 MS
PR INTERVAL: 136 MS
Q ONSET: 220 MS
QRS COUNT: 13 BEATS
QRS DURATION: 74 MS
QT INTERVAL: 426 MS
QTC CALCULATION(BAZETT): 500 MS
QTC FREDERICIA: 474 MS
R AXIS: 69 DEGREES
T AXIS: 64 DEGREES
T OFFSET: 433 MS
VENTRICULAR RATE: 83 BPM

## 2025-04-14 ENCOUNTER — HOSPITAL ENCOUNTER (EMERGENCY)
Facility: HOSPITAL | Age: 50
Discharge: HOME | End: 2025-04-14
Payer: COMMERCIAL

## 2025-04-14 VITALS
TEMPERATURE: 97.3 F | HEART RATE: 95 BPM | HEIGHT: 66 IN | RESPIRATION RATE: 18 BRPM | OXYGEN SATURATION: 94 % | DIASTOLIC BLOOD PRESSURE: 56 MMHG | SYSTOLIC BLOOD PRESSURE: 101 MMHG | BODY MASS INDEX: 18.88 KG/M2

## 2025-04-14 PROCEDURE — 99281 EMR DPT VST MAYX REQ PHY/QHP: CPT

## 2025-04-14 ASSESSMENT — LIFESTYLE VARIABLES
EVER FELT BAD OR GUILTY ABOUT YOUR DRINKING: NO
EVER HAD A DRINK FIRST THING IN THE MORNING TO STEADY YOUR NERVES TO GET RID OF A HANGOVER: NO
HAVE PEOPLE ANNOYED YOU BY CRITICIZING YOUR DRINKING: NO
TOTAL SCORE: 0
HAVE YOU EVER FELT YOU SHOULD CUT DOWN ON YOUR DRINKING: NO

## 2025-04-14 ASSESSMENT — PAIN SCALES - GENERAL: PAINLEVEL_OUTOF10: 0 - NO PAIN

## 2025-04-14 ASSESSMENT — PAIN - FUNCTIONAL ASSESSMENT: PAIN_FUNCTIONAL_ASSESSMENT: 0-10

## 2025-04-14 NOTE — ED TRIAGE NOTES
Secondary to patient volumes and overcrowding, I performed a brief medical screening exam of the patient in triage, as the patient awaits space in the main ED.    History of Present Illness:  Kriss Malloy presents with   Chief Complaint   Patient presents with    Weakness, Gen    Nausea   States that she believes her Melchor's disease is flaring up.    Physical Exam:  General - In no acute distress  Respiratory - Breathing comfortably  Cardiac - Normal S1, S2, no m/g/r  Neurologic - Moving all extremities      Medical Decision Making:  Patient will require further evaluation in the main ED.    Initial diagnostic tests were ordered from triage.      The patient demonstrates understanding that this initial evaluation is a brief medical screening exam and the expectation is that they await for space in the main ED to be further evaluated.  The patient understands that, if they leave prior to further evaluation in the main ED after this initial evaluation in triage, they are doing so under their own accord knowing that their evaluation/work-up is not yet complete. The patient also understands that any preliminary diagnostic results, including abnormalities, may not be shared with them, if they choose to leave prior to further evaluation in the main ED.

## 2025-04-14 NOTE — ED TRIAGE NOTES
Patient arrives from home with complaints of weakness and nausea that has been ongoing since this morning.  Patient states she has a history of addisons disease.  Patient also appears to be having hallucinations while in triage, she is having a conversation with herself and when asked if she is having auditory or visual hallucinations she abruptly stops speaking and denies.

## 2025-04-17 ENCOUNTER — HOSPITAL ENCOUNTER (EMERGENCY)
Facility: HOSPITAL | Age: 50
Discharge: HOME | End: 2025-04-17
Attending: STUDENT IN AN ORGANIZED HEALTH CARE EDUCATION/TRAINING PROGRAM
Payer: COMMERCIAL

## 2025-04-17 ENCOUNTER — APPOINTMENT (OUTPATIENT)
Dept: CARDIOLOGY | Facility: HOSPITAL | Age: 50
End: 2025-04-17
Payer: COMMERCIAL

## 2025-04-17 ENCOUNTER — PHARMACY VISIT (OUTPATIENT)
Dept: PHARMACY | Facility: CLINIC | Age: 50
End: 2025-04-17
Payer: MEDICAID

## 2025-04-17 VITALS
RESPIRATION RATE: 19 BRPM | OXYGEN SATURATION: 97 % | DIASTOLIC BLOOD PRESSURE: 62 MMHG | SYSTOLIC BLOOD PRESSURE: 90 MMHG | WEIGHT: 117.06 LBS | HEART RATE: 68 BPM | BODY MASS INDEX: 18.81 KG/M2 | TEMPERATURE: 97.2 F | HEIGHT: 66 IN

## 2025-04-17 DIAGNOSIS — E27.1 ADDISON'S DISEASE (MULTI): ICD-10-CM

## 2025-04-17 DIAGNOSIS — E27.1 ADDISON DISEASE (MULTI): Primary | ICD-10-CM

## 2025-04-17 LAB
ANION GAP SERPL CALC-SCNC: 14 MMOL/L (ref 10–20)
BASOPHILS # BLD AUTO: 0.01 X10*3/UL (ref 0–0.1)
BASOPHILS NFR BLD AUTO: 0.1 %
BUN SERPL-MCNC: 13 MG/DL (ref 6–23)
CALCIUM SERPL-MCNC: 7.9 MG/DL (ref 8.6–10.3)
CHLORIDE SERPL-SCNC: 104 MMOL/L (ref 98–107)
CO2 SERPL-SCNC: 24 MMOL/L (ref 21–32)
CREAT SERPL-MCNC: 0.9 MG/DL (ref 0.5–1.05)
EGFRCR SERPLBLD CKD-EPI 2021: 80 ML/MIN/1.73M*2
EOSINOPHIL # BLD AUTO: 0 X10*3/UL (ref 0–0.7)
EOSINOPHIL NFR BLD AUTO: 0 %
ERYTHROCYTE [DISTWIDTH] IN BLOOD BY AUTOMATED COUNT: 11.9 % (ref 11.5–14.5)
GLUCOSE SERPL-MCNC: 82 MG/DL (ref 74–99)
HCT VFR BLD AUTO: 39.9 % (ref 36–46)
HGB BLD-MCNC: 13.2 G/DL (ref 12–16)
IMM GRANULOCYTES # BLD AUTO: 0.01 X10*3/UL (ref 0–0.7)
IMM GRANULOCYTES NFR BLD AUTO: 0.1 % (ref 0–0.9)
LYMPHOCYTES # BLD AUTO: 2.45 X10*3/UL (ref 1.2–4.8)
LYMPHOCYTES NFR BLD AUTO: 33 %
MAGNESIUM SERPL-MCNC: 1.43 MG/DL (ref 1.6–2.4)
MCH RBC QN AUTO: 28.8 PG (ref 26–34)
MCHC RBC AUTO-ENTMCNC: 33.1 G/DL (ref 32–36)
MCV RBC AUTO: 87 FL (ref 80–100)
MONOCYTES # BLD AUTO: 0.79 X10*3/UL (ref 0.1–1)
MONOCYTES NFR BLD AUTO: 10.6 %
NEUTROPHILS # BLD AUTO: 4.16 X10*3/UL (ref 1.2–7.7)
NEUTROPHILS NFR BLD AUTO: 56.2 %
NRBC BLD-RTO: 0 /100 WBCS (ref 0–0)
PHOSPHATE SERPL-MCNC: 4.9 MG/DL (ref 2.5–4.9)
PLATELET # BLD AUTO: 232 X10*3/UL (ref 150–450)
POTASSIUM SERPL-SCNC: 4.2 MMOL/L (ref 3.5–5.3)
RBC # BLD AUTO: 4.59 X10*6/UL (ref 4–5.2)
SODIUM SERPL-SCNC: 138 MMOL/L (ref 136–145)
WBC # BLD AUTO: 7.4 X10*3/UL (ref 4.4–11.3)

## 2025-04-17 PROCEDURE — 80048 BASIC METABOLIC PNL TOTAL CA: CPT | Performed by: STUDENT IN AN ORGANIZED HEALTH CARE EDUCATION/TRAINING PROGRAM

## 2025-04-17 PROCEDURE — 36415 COLL VENOUS BLD VENIPUNCTURE: CPT | Performed by: STUDENT IN AN ORGANIZED HEALTH CARE EDUCATION/TRAINING PROGRAM

## 2025-04-17 PROCEDURE — 85025 COMPLETE CBC W/AUTO DIFF WBC: CPT | Performed by: STUDENT IN AN ORGANIZED HEALTH CARE EDUCATION/TRAINING PROGRAM

## 2025-04-17 PROCEDURE — 99284 EMERGENCY DEPT VISIT MOD MDM: CPT | Performed by: STUDENT IN AN ORGANIZED HEALTH CARE EDUCATION/TRAINING PROGRAM

## 2025-04-17 PROCEDURE — 84100 ASSAY OF PHOSPHORUS: CPT | Performed by: STUDENT IN AN ORGANIZED HEALTH CARE EDUCATION/TRAINING PROGRAM

## 2025-04-17 PROCEDURE — 83735 ASSAY OF MAGNESIUM: CPT | Performed by: STUDENT IN AN ORGANIZED HEALTH CARE EDUCATION/TRAINING PROGRAM

## 2025-04-17 PROCEDURE — RXMED WILLOW AMBULATORY MEDICATION CHARGE

## 2025-04-17 PROCEDURE — 2500000001 HC RX 250 WO HCPCS SELF ADMINISTERED DRUGS (ALT 637 FOR MEDICARE OP): Performed by: STUDENT IN AN ORGANIZED HEALTH CARE EDUCATION/TRAINING PROGRAM

## 2025-04-17 PROCEDURE — 93005 ELECTROCARDIOGRAM TRACING: CPT

## 2025-04-17 RX ORDER — FLUDROCORTISONE ACETATE 0.1 MG/1
0.1 TABLET ORAL ONCE
Status: COMPLETED | OUTPATIENT
Start: 2025-04-17 | End: 2025-04-17

## 2025-04-17 RX ORDER — FLUDROCORTISONE ACETATE 0.1 MG/1
0.1 TABLET ORAL DAILY
Qty: 90 TABLET | Refills: 0 | Status: SHIPPED | OUTPATIENT
Start: 2025-04-17 | End: 2025-07-16

## 2025-04-17 RX ORDER — HYDROCORTISONE 10 MG/1
10 TABLET ORAL DAILY
Qty: 30 TABLET | Refills: 0 | Status: SHIPPED | OUTPATIENT
Start: 2025-04-17 | End: 2025-05-17

## 2025-04-17 RX ORDER — CALCITRIOL 0.25 UG/1
0.25 CAPSULE ORAL ONCE
Status: COMPLETED | OUTPATIENT
Start: 2025-04-17 | End: 2025-04-17

## 2025-04-17 RX ORDER — HYDROCORTISONE 5 MG/1
5 TABLET ORAL NIGHTLY
Qty: 30 TABLET | Refills: 0 | Status: SHIPPED | OUTPATIENT
Start: 2025-04-17 | End: 2025-05-17

## 2025-04-17 RX ORDER — HYDROCORTISONE 10 MG/1
TABLET ORAL
Qty: 75 TABLET | Refills: 0 | Status: SHIPPED | OUTPATIENT
Start: 2025-04-17 | End: 2025-05-17

## 2025-04-17 RX ORDER — CALCITRIOL 0.25 UG/1
0.25 CAPSULE ORAL DAILY
Qty: 90 CAPSULE | Refills: 0 | Status: SHIPPED | OUTPATIENT
Start: 2025-04-17 | End: 2025-07-16

## 2025-04-17 RX ORDER — HYDROCORTISONE 10 MG/1
10 TABLET ORAL ONCE
Status: COMPLETED | OUTPATIENT
Start: 2025-04-17 | End: 2025-04-17

## 2025-04-17 RX ADMIN — FLUDROCORTISONE ACETATE 0.1 MG: 0.1 TABLET ORAL at 16:32

## 2025-04-17 RX ADMIN — CALCITRIOL CAPSULES 0.25 MCG 0.25 MCG: 0.25 CAPSULE ORAL at 16:32

## 2025-04-17 RX ADMIN — HYDROCORTISONE 10 MG: 10 TABLET ORAL at 16:32

## 2025-04-17 SDOH — HEALTH STABILITY: MENTAL HEALTH: FOR HIGH RISK PATIENTS: 1:1 PATIENT OBSERVER AT ALL TIMES

## 2025-04-17 SDOH — HEALTH STABILITY: MENTAL HEALTH: NEEDS EXPRESSED: PHYSICAL

## 2025-04-17 SDOH — HEALTH STABILITY: MENTAL HEALTH: HAVE YOU WISHED YOU WERE DEAD OR WISHED YOU COULD GO TO SLEEP AND NOT WAKE UP?: NO

## 2025-04-17 SDOH — HEALTH STABILITY: MENTAL HEALTH: OTHER SUICIDE PRECAUTIONS INCLUDE: ELOPEMENT RISK IDENTIFIED

## 2025-04-17 SDOH — HEALTH STABILITY: MENTAL HEALTH: BEHAVIORS/MOOD: ANXIOUS

## 2025-04-17 SDOH — SOCIAL STABILITY: SOCIAL NETWORK: EMOTIONAL SUPPORT GIVEN: REASSURE

## 2025-04-17 SDOH — HEALTH STABILITY: MENTAL HEALTH: FOR HIGH RISK PATIENTS: ALL INTERVENTIONS ABOVE, PLUS:

## 2025-04-17 SDOH — HEALTH STABILITY: MENTAL HEALTH: HALLUCINATION: AUDITORY

## 2025-04-17 SDOH — HEALTH STABILITY: MENTAL HEALTH: SUICIDE ASSESSMENT: ADULT (C-SSRS)

## 2025-04-17 SDOH — HEALTH STABILITY: MENTAL HEALTH
OTHER SUICIDE PRECAUTIONS INCLUDE: PATIENT PLACED IN AN EASILY OBSERVABLE ROOM WITH DOOR/CURTAIN REMAINING OPEN;PATIENT PLACED IN GOWN (SNAPS OR PAPER GOWNS PREFERRED) AND WANDED;REMAINING RISKS IDENTIFIED AND MITIGATED;HOURLY BEHAVIORAL ASSESSMENT PERFORMED

## 2025-04-17 SDOH — HEALTH STABILITY: MENTAL HEALTH: CONTENT: PREOCCUPATION

## 2025-04-17 SDOH — HEALTH STABILITY: MENTAL HEALTH: BEHAVIORAL HEALTH(WDL): EXCEPTIONS TO WDL

## 2025-04-17 SDOH — HEALTH STABILITY: MENTAL HEALTH: HAVE YOU ACTUALLY HAD ANY THOUGHTS OF KILLING YOURSELF?: NO

## 2025-04-17 SDOH — HEALTH STABILITY: MENTAL HEALTH: HAVE YOU EVER DONE ANYTHING, STARTED TO DO ANYTHING, OR PREPARED TO DO ANYTHING TO END YOUR LIFE?: NO

## 2025-04-17 SDOH — HEALTH STABILITY: MENTAL HEALTH: BEHAVIORS/MOOD: ANXIOUS;DEFIANT;HALLUCINATIONS;UNCOOPERATIVE

## 2025-04-17 SDOH — HEALTH STABILITY: MENTAL HEALTH: BEHAVIORS/MOOD: AGITATED;ANXIOUS;DEFIANT;HALLUCINATIONS;HOSTILE;INAPPROPRIATE

## 2025-04-17 SDOH — HEALTH STABILITY: MENTAL HEALTH
OTHER SUICIDE PRECAUTIONS INCLUDE: PATIENT PLACED IN AN EASILY OBSERVABLE ROOM WITH DOOR/CURTAIN REMAINING OPEN;PATIENT PLACED IN GOWN (SNAPS OR PAPER GOWNS PREFERRED) AND WANDED;PERSONAL BELONGINGS SECURED

## 2025-04-17 ASSESSMENT — LIFESTYLE VARIABLES
HAVE YOU EVER FELT YOU SHOULD CUT DOWN ON YOUR DRINKING: NO
HAVE PEOPLE ANNOYED YOU BY CRITICIZING YOUR DRINKING: NO
EVER HAD A DRINK FIRST THING IN THE MORNING TO STEADY YOUR NERVES TO GET RID OF A HANGOVER: NO
EVER FELT BAD OR GUILTY ABOUT YOUR DRINKING: NO
TOTAL SCORE: 0

## 2025-04-17 ASSESSMENT — PAIN SCALES - GENERAL: PAINLEVEL_OUTOF10: 0 - NO PAIN

## 2025-04-17 NOTE — ED TRIAGE NOTES
"Pt biba due to hallucations. Ems states pt has hx of addisons and called because she \"wasn't feeling well\" but could not describe what was bothering her.  Ems states en route pt ripped off bp cuff and was refusing vitals and was laughing to herself in the back of the truck. PT denies SI/HI  "

## 2025-04-17 NOTE — ED PROVIDER NOTES
HPI   Chief Complaint   Patient presents with    Hallucinations       Patient is a 49-year-old female past medical history as below presents today for evaluation of concern for worsening Melchor's symptoms.  Patient is chronically noncompliant due to various psychosocial factors.  States she has no doctor and does not obtain medicines as an outpatient.  She has not been feeling well and believes it is due to her Melchor's.              Patient History   Medical History[1]  Surgical History[2]  Family History[3]  Social History[4]    Physical Exam   ED Triage Vitals [04/17/25 1159]   Temperature Heart Rate Respirations BP   36.9 °C (98.4 °F) 71 19 85/58      Pulse Ox Temp Source Heart Rate Source Patient Position   99 % Tympanic Monitor Lying      BP Location FiO2 (%)     Left arm --       Physical Exam  Vitals and nursing note reviewed.   Constitutional:       Appearance: Normal appearance.   HENT:      Head: Normocephalic and atraumatic.      Right Ear: External ear normal.      Left Ear: External ear normal.      Nose: Nose normal.   Eyes:      General: No scleral icterus.     Conjunctiva/sclera: Conjunctivae normal.   Pulmonary:      Effort: Pulmonary effort is normal.   Abdominal:      General: Abdomen is flat.   Musculoskeletal:         General: No deformity.      Cervical back: Neck supple.   Skin:     General: Skin is warm.   Neurological:      General: No focal deficit present.      Mental Status: She is alert and oriented to person, place, and time. Mental status is at baseline.   Psychiatric:         Mood and Affect: Mood normal.         Behavior: Behavior normal.           ED Course & MDM   ED Course as of 04/19/25 1656   Sat Apr 19, 2025   1655 ECG 12 lead  EKG shows sinus rhythm borderline T wave abnormality inferior leads normal intervals [SE]      ED Course User Index  [SE] John Lomas MD         Diagnoses as of 04/19/25 1656   Parke disease (Multi)   Parke's disease (Multi)                 No  data recorded     [place] Coma Scale Score: 15 (04/17/25 1204 : Princess Mckeon, KAVITA)                           Medical Decision Making  Presents for concerns for decompensated [place]'s disease has not been receiving her medications.  Labs are on remarkable she is not in any adrenal crisis.  She was given her typical daily meds as well as prescriptions brought to bedside 1 month supply.  Primary care referral given return precautions given follow-up with primary care.    Amount and/or Complexity of Data Reviewed  Labs: ordered.  ECG/medicine tests: ordered and independent interpretation performed. Decision-making details documented in ED Course.    Risk  Prescription drug management.  Diagnosis or treatment significantly limited by social determinants of health.        Procedure  Procedures       [1]   Past Medical History:  Diagnosis Date    [place] disease (Multi)    [2] No past surgical history on file.  [3] No family history on file.  [4]   Social History  Tobacco Use    Smoking status: Former     Types: Cigarettes    Smokeless tobacco: Not on file    Tobacco comments:     Smoked as a teenager   Vaping Use    Vaping status: Unknown   Substance Use Topics    Alcohol use: Not Currently    Drug use: Not Currently        John Lomas MD  04/19/25 2542

## 2025-04-18 LAB
ATRIAL RATE: 75 BPM
P AXIS: 51 DEGREES
PR INTERVAL: 147 MS
Q ONSET: 252 MS
QRS COUNT: 12 BEATS
QRS DURATION: 106 MS
QT INTERVAL: 460 MS
QTC CALCULATION(BAZETT): 514 MS
QTC FREDERICIA: 495 MS
R AXIS: 54 DEGREES
T AXIS: 5 DEGREES
T OFFSET: 482 MS
VENTRICULAR RATE: 75 BPM

## 2025-05-13 ENCOUNTER — HOSPITAL ENCOUNTER (EMERGENCY)
Facility: HOSPITAL | Age: 50
Discharge: LEFT BEFORE DISCHARGE | End: 2025-05-13
Attending: EMERGENCY MEDICINE
Payer: COMMERCIAL

## 2025-05-13 VITALS
SYSTOLIC BLOOD PRESSURE: 97 MMHG | RESPIRATION RATE: 16 BRPM | BODY MASS INDEX: 18.8 KG/M2 | WEIGHT: 117 LBS | HEART RATE: 88 BPM | DIASTOLIC BLOOD PRESSURE: 50 MMHG | TEMPERATURE: 98.6 F | OXYGEN SATURATION: 100 % | HEIGHT: 66 IN

## 2025-05-13 DIAGNOSIS — R53.83 OTHER FATIGUE: Primary | ICD-10-CM

## 2025-05-13 DIAGNOSIS — E27.1 ADDISON DISEASE (MULTI): ICD-10-CM

## 2025-05-13 DIAGNOSIS — E27.1 ADDISON'S DISEASE (MULTI): ICD-10-CM

## 2025-05-13 LAB — GLUCOSE BLD MANUAL STRIP-MCNC: 100 MG/DL (ref 74–99)

## 2025-05-13 PROCEDURE — 36415 COLL VENOUS BLD VENIPUNCTURE: CPT | Performed by: PHYSICIAN ASSISTANT

## 2025-05-13 PROCEDURE — 2500000001 HC RX 250 WO HCPCS SELF ADMINISTERED DRUGS (ALT 637 FOR MEDICARE OP)

## 2025-05-13 PROCEDURE — 82947 ASSAY GLUCOSE BLOOD QUANT: CPT

## 2025-05-13 PROCEDURE — 99283 EMERGENCY DEPT VISIT LOW MDM: CPT | Performed by: EMERGENCY MEDICINE

## 2025-05-13 RX ORDER — CALCITRIOL 0.25 UG/1
0.25 CAPSULE ORAL DAILY
Qty: 90 CAPSULE | Refills: 0 | Status: SHIPPED | OUTPATIENT
Start: 2025-05-13 | End: 2025-08-11

## 2025-05-13 RX ORDER — FLUDROCORTISONE ACETATE 0.1 MG/1
0.1 TABLET ORAL ONCE
Status: COMPLETED | OUTPATIENT
Start: 2025-05-13 | End: 2025-05-13

## 2025-05-13 RX ORDER — HYDROCORTISONE 10 MG/1
10 TABLET ORAL ONCE
Status: COMPLETED | OUTPATIENT
Start: 2025-05-13 | End: 2025-05-13

## 2025-05-13 RX ORDER — FLUDROCORTISONE ACETATE 0.1 MG/1
0.1 TABLET ORAL DAILY
Qty: 30 TABLET | Refills: 0 | Status: SHIPPED | OUTPATIENT
Start: 2025-05-13

## 2025-05-13 RX ORDER — FLUDROCORTISONE ACETATE 0.1 MG/1
0.1 TABLET ORAL DAILY
Qty: 30 TABLET | Refills: 0 | Status: SHIPPED | OUTPATIENT
Start: 2025-05-13 | End: 2025-05-13

## 2025-05-13 RX ORDER — PSYLLIUM HUSK 0.4 G
1 CAPSULE ORAL 2 TIMES DAILY
Qty: 20 TABLET | Refills: 0 | Status: SHIPPED | OUTPATIENT
Start: 2025-05-13 | End: 2025-05-23

## 2025-05-13 RX ORDER — HYDROCORTISONE 10 MG/1
10 TABLET ORAL DAILY
Qty: 30 TABLET | Refills: 0 | Status: SHIPPED | OUTPATIENT
Start: 2025-05-13 | End: 2025-06-12

## 2025-05-13 RX ORDER — HYDROCORTISONE 10 MG/1
10 TABLET ORAL DAILY
Qty: 30 TABLET | Refills: 0 | Status: SHIPPED | OUTPATIENT
Start: 2025-05-13 | End: 2025-05-13

## 2025-05-13 RX ORDER — CALCITRIOL 0.25 UG/1
0.25 CAPSULE ORAL DAILY
Qty: 90 CAPSULE | Refills: 0 | Status: SHIPPED | OUTPATIENT
Start: 2025-05-13 | End: 2025-05-13

## 2025-05-13 RX ORDER — CALCITRIOL 0.25 UG/1
0.25 CAPSULE ORAL ONCE
Status: COMPLETED | OUTPATIENT
Start: 2025-05-13 | End: 2025-05-13

## 2025-05-13 RX ORDER — PSYLLIUM HUSK 0.4 G
1 CAPSULE ORAL 2 TIMES DAILY
Qty: 20 TABLET | Refills: 0 | Status: SHIPPED | OUTPATIENT
Start: 2025-05-13 | End: 2025-05-13

## 2025-05-13 RX ADMIN — CALCITRIOL CAPSULES 0.25 MCG 0.25 MCG: 0.25 CAPSULE ORAL at 17:06

## 2025-05-13 RX ADMIN — HYDROCORTISONE 10 MG: 10 TABLET ORAL at 17:06

## 2025-05-13 RX ADMIN — FLUDROCORTISONE ACETATE 0.1 MG: 0.1 TABLET ORAL at 17:06

## 2025-05-13 NOTE — DISCHARGE INSTRUCTIONS
You noted fatigue typical for your Melchor's disease.  You are noted to refuse lab work in the emergency department.  Follow-up with primary care soon as possible.  You can return to the emergency department at anytime.

## 2025-05-13 NOTE — ED NOTES
Pt refused repeat vitals, labwork and to sign paper. Pt vrbaalizes understanding      Dasha Bustillos RN  05/13/25 2832

## 2025-05-13 NOTE — ED PROVIDER NOTES
CC: No chief complaint on file.     HPI:  Patient is a 49-year-old female with a past medical history significant for Melchor's disease schizophrenia who presented to the ED for fatigue.  She notes generalized fatigue over the past couple of days typical for Cochran's disease.  Notes that she has been nonadherent to her Melchor's medications.  Notes generalized fatigue.  Denies having any other symptoms.  Denied SI, HI, and AVH.  Denied fevers, chills, chest pain or difficulty breathing, headache, trauma, falls, abdominal pain, neck pain, back pain, cough, congestion, nausea, vomiting, dysuria, and abnormal bowel movements.    Limitations to history: None  Independent historian(s): Patient  Records Reviewed: Recent available ED and inpatient notes reviewed in EMR.    PMHx/PSHx:  Per HPI.   - has a past medical history of Cochran disease (Multi).  - has no past surgical history on file.    Medications:  Reviewed in EMR. See EMR for complete list of medications and doses.    Allergies:  Patient has no known allergies.    Social History:  - Tobacco:  reports that she has quit smoking. Her smoking use included cigarettes. She does not have any smokeless tobacco history on file.   - Alcohol:  reports that she does not currently use alcohol.   - Illicit Drugs:  reports that she does not currently use drugs.     ROS:  Per HPI.       ???????????????????????????????????????????????????????????????  Triage Vitals:  T 37 °C (98.6 °F)  HR 88  BP 97/50  RR 16  O2 100 %      Physical Exam  Vitals and nursing note reviewed.   Constitutional:       General: She is not in acute distress.  HENT:      Head: Normocephalic and atraumatic.      Nose: Nose normal.      Mouth/Throat:      Mouth: Mucous membranes are moist.   Eyes:      Conjunctiva/sclera: Conjunctivae normal.   Cardiovascular:      Rate and Rhythm: Normal rate and regular rhythm.      Pulses: Normal pulses.   Pulmonary:      Effort: Pulmonary effort is normal. No  respiratory distress.      Breath sounds: Normal breath sounds.   Abdominal:      Palpations: Abdomen is soft.      Tenderness: There is no abdominal tenderness.   Skin:     General: Skin is warm.   Neurological:      General: No focal deficit present.      Mental Status: She is alert and oriented to person, place, and time.   Psychiatric:      Comments: Denied SI, HI, and AVH         ???????????????????????????????????????????????????????????????  Labs:   Labs Reviewed   POCT GLUCOSE - Abnormal       Result Value    POCT Glucose 100 (*)    CBC WITH AUTO DIFFERENTIAL   MAGNESIUM   COMPREHENSIVE METABOLIC PANEL   PHOSPHORUS   POCT GLUCOSE METER        Imaging:   No orders to display      MDM:  Patient is a 49-year-old female with a past medical history significant for Fentress's disease schizophrenia who presented to the ED for fatigue.  Patient presented HDS.  Physical exam finding significant for 49-year-old female in no acute distress.  Patient was noted to be verbally aggressive to staff from the attempted to obtain IV access.  No findings concerning for sepsis.  Basic labs ordered to evaluate for electrolyte abnormalities and anemia.  Low clinical concern for an acute cardiac process.  Patient noted refuse lab work.  Patient noted to be significantly verbally aggressive.  Patient ordered home calcitriol, fludrocortisone, and hydrocortisone.  Patient does not want lab work. This patient has the appropriate insight and judgement to their condition, is free from distracting injury or cognitive impairment, and in my medical judgment has the capacity to make their own decisions. They presented with fatigue, and I am concerned their symptoms may indicate Fentress crisis. The patient vocalized understanding of this. As vitals and exam is not 100% sensitive in ruling out the disease process I am concerned for, I believe they should undergo further testing/monitoring. We are currently treating the patient with home  medications, and are recommending hospitalization/further testing. Given that I am concerned about Melchor crisis, the complications of this condition if left untreated include: . The patient expressed not wanting lab work. After discussion, the patient is unwilling to undergo lab work. They are declining further care at this time, and have elected to sign out against medical advice. I am unable to convince the patient to stay in the hospital. I have asked them to return to the hospital as soon as possible to complete their treatment/evaluation and have informed them that they will be welcome to do so. I will be providing the patient with a prescription for home meds and follow up with primary care along with discharge paperwork. Prior to signing out against medical advice, the patient had the opportunity to ask questions and these were answered. Dasha Bustillos RN at bedside for AMA.    ED Course:  Diagnoses as of 05/13/25 1545   Other fatigue       Social Determinants Limiting Care:  None identified    Disposition:  AMA    Guy Lynch MD   Emergency Medicine PGY-3  [facility]    Comment: Please note this report has been produced using speech recognition software and may contain errors related to that system including errors in grammar, punctuation, and spelling as well as words and phrases that may be inappropriate.  If there are any questions or concerns please feel free to contact the dictating provider for clarification.    Procedures ? [employer] last updated 5/13/2025 3:30 PM        Guy Lynch MD  Resident  05/13/25 1540

## 2025-05-13 NOTE — ED TRIAGE NOTES
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

## 2025-05-27 ENCOUNTER — HOSPITAL ENCOUNTER (EMERGENCY)
Facility: HOSPITAL | Age: 50
Discharge: HOME | End: 2025-05-28
Attending: EMERGENCY MEDICINE
Payer: COMMERCIAL

## 2025-05-27 ENCOUNTER — APPOINTMENT (OUTPATIENT)
Dept: RADIOLOGY | Facility: HOSPITAL | Age: 50
End: 2025-05-27
Payer: COMMERCIAL

## 2025-05-27 DIAGNOSIS — R45.1 AGITATION: Primary | ICD-10-CM

## 2025-05-27 DIAGNOSIS — R53.1 GENERALIZED WEAKNESS: ICD-10-CM

## 2025-05-27 LAB
ALBUMIN SERPL BCP-MCNC: 4.1 G/DL (ref 3.4–5)
ALP SERPL-CCNC: 55 U/L (ref 33–110)
ALT SERPL W P-5'-P-CCNC: 8 U/L (ref 7–45)
ANION GAP SERPL CALC-SCNC: 13 MMOL/L (ref 10–20)
APAP SERPL-MCNC: <10 UG/ML (ref ?–30)
AST SERPL W P-5'-P-CCNC: 29 U/L (ref 9–39)
B-HCG SERPL-ACNC: 6 MIU/ML
BILIRUB SERPL-MCNC: 0.8 MG/DL (ref 0–1.2)
BUN SERPL-MCNC: 28 MG/DL (ref 6–23)
CALCIUM SERPL-MCNC: 8.8 MG/DL (ref 8.6–10.3)
CHLORIDE SERPL-SCNC: 100 MMOL/L (ref 98–107)
CO2 SERPL-SCNC: 26 MMOL/L (ref 21–32)
CREAT SERPL-MCNC: 0.95 MG/DL (ref 0.5–1.05)
EGFRCR SERPLBLD CKD-EPI 2021: 75 ML/MIN/1.73M*2
ETHANOL SERPL-MCNC: <10 MG/DL
GLUCOSE SERPL-MCNC: 95 MG/DL (ref 74–99)
LACTATE SERPL-SCNC: 0.6 MMOL/L (ref 0.4–2)
LIPASE SERPL-CCNC: 30 U/L (ref 9–82)
POTASSIUM SERPL-SCNC: 4.4 MMOL/L (ref 3.5–5.3)
PROT SERPL-MCNC: 7.1 G/DL (ref 6.4–8.2)
SALICYLATES SERPL-MCNC: <3 MG/DL (ref ?–20)
SODIUM SERPL-SCNC: 135 MMOL/L (ref 136–145)

## 2025-05-27 PROCEDURE — 80143 DRUG ASSAY ACETAMINOPHEN: CPT | Performed by: EMERGENCY MEDICINE

## 2025-05-27 PROCEDURE — 70450 CT HEAD/BRAIN W/O DYE: CPT | Performed by: SURGERY

## 2025-05-27 PROCEDURE — 70450 CT HEAD/BRAIN W/O DYE: CPT

## 2025-05-27 PROCEDURE — 72125 CT NECK SPINE W/O DYE: CPT | Performed by: SURGERY

## 2025-05-27 PROCEDURE — 84075 ASSAY ALKALINE PHOSPHATASE: CPT | Performed by: EMERGENCY MEDICINE

## 2025-05-27 PROCEDURE — 99285 EMERGENCY DEPT VISIT HI MDM: CPT | Mod: 25 | Performed by: EMERGENCY MEDICINE

## 2025-05-27 PROCEDURE — 84702 CHORIONIC GONADOTROPIN TEST: CPT | Performed by: EMERGENCY MEDICINE

## 2025-05-27 PROCEDURE — 83605 ASSAY OF LACTIC ACID: CPT | Performed by: EMERGENCY MEDICINE

## 2025-05-27 PROCEDURE — 74176 CT ABD & PELVIS W/O CONTRAST: CPT

## 2025-05-27 PROCEDURE — 74176 CT ABD & PELVIS W/O CONTRAST: CPT | Mod: FOREIGN READ | Performed by: RADIOLOGY

## 2025-05-27 PROCEDURE — 83690 ASSAY OF LIPASE: CPT | Performed by: EMERGENCY MEDICINE

## 2025-05-27 PROCEDURE — 72125 CT NECK SPINE W/O DYE: CPT

## 2025-05-27 PROCEDURE — 99291 CRITICAL CARE FIRST HOUR: CPT | Performed by: EMERGENCY MEDICINE

## 2025-05-27 PROCEDURE — 36415 COLL VENOUS BLD VENIPUNCTURE: CPT | Performed by: EMERGENCY MEDICINE

## 2025-05-27 SDOH — HEALTH STABILITY: MENTAL HEALTH: BEHAVIORS/MOOD: APPROPRIATE FOR SITUATION;CALM;COOPERATIVE

## 2025-05-27 SDOH — HEALTH STABILITY: MENTAL HEALTH: CONTENT: UNREMARKABLE

## 2025-05-27 SDOH — HEALTH STABILITY: MENTAL HEALTH: HAVE YOU EVER DONE ANYTHING, STARTED TO DO ANYTHING, OR PREPARED TO DO ANYTHING TO END YOUR LIFE?: NO

## 2025-05-27 SDOH — HEALTH STABILITY: MENTAL HEALTH: BEHAVIORAL HEALTH(WDL): EXCEPTIONS TO WDL

## 2025-05-27 SDOH — HEALTH STABILITY: MENTAL HEALTH: SUICIDE ASSESSMENT: ADULT (C-SSRS)

## 2025-05-27 SDOH — HEALTH STABILITY: MENTAL HEALTH: HAVE YOU ACTUALLY HAD ANY THOUGHTS OF KILLING YOURSELF?: NO

## 2025-05-27 SDOH — HEALTH STABILITY: MENTAL HEALTH: HAVE YOU WISHED YOU WERE DEAD OR WISHED YOU COULD GO TO SLEEP AND NOT WAKE UP?: NO

## 2025-05-27 ASSESSMENT — PAIN SCALES - GENERAL: PAINLEVEL_OUTOF10: 0 - NO PAIN

## 2025-05-27 ASSESSMENT — PAIN - FUNCTIONAL ASSESSMENT: PAIN_FUNCTIONAL_ASSESSMENT: 0-10

## 2025-05-28 ENCOUNTER — APPOINTMENT (OUTPATIENT)
Dept: CARDIOLOGY | Facility: HOSPITAL | Age: 50
End: 2025-05-28
Payer: COMMERCIAL

## 2025-05-28 ENCOUNTER — APPOINTMENT (OUTPATIENT)
Dept: RADIOLOGY | Facility: HOSPITAL | Age: 50
End: 2025-05-28
Payer: COMMERCIAL

## 2025-05-28 VITALS
HEART RATE: 87 BPM | DIASTOLIC BLOOD PRESSURE: 67 MMHG | TEMPERATURE: 97.2 F | SYSTOLIC BLOOD PRESSURE: 105 MMHG | OXYGEN SATURATION: 97 % | RESPIRATION RATE: 16 BRPM

## 2025-05-28 LAB
AMPHETAMINES UR QL SCN: NORMAL
APPEARANCE UR: CLEAR
ATRIAL RATE: 73 BPM
BARBITURATES UR QL SCN: NORMAL
BENZODIAZ UR QL SCN: NORMAL
BILIRUB UR STRIP.AUTO-MCNC: NEGATIVE MG/DL
BZE UR QL SCN: NORMAL
CANNABINOIDS UR QL SCN: NORMAL
COLOR UR: YELLOW
FENTANYL+NORFENTANYL UR QL SCN: NORMAL
GLUCOSE UR STRIP.AUTO-MCNC: NORMAL MG/DL
KETONES UR STRIP.AUTO-MCNC: NEGATIVE MG/DL
LEUKOCYTE ESTERASE UR QL STRIP.AUTO: ABNORMAL
METHADONE UR QL SCN: NORMAL
MUCOUS THREADS #/AREA URNS AUTO: NORMAL /LPF
NITRITE UR QL STRIP.AUTO: NEGATIVE
OPIATES UR QL SCN: NORMAL
OXYCODONE+OXYMORPHONE UR QL SCN: NORMAL
P AXIS: 59 DEGREES
P OFFSET: 189 MS
P ONSET: 155 MS
PCP UR QL SCN: NORMAL
PH UR STRIP.AUTO: 5.5 [PH]
PR INTERVAL: 134 MS
PROT UR STRIP.AUTO-MCNC: NEGATIVE MG/DL
Q ONSET: 222 MS
QRS COUNT: 12 BEATS
QRS DURATION: 84 MS
QT INTERVAL: 446 MS
QTC CALCULATION(BAZETT): 491 MS
QTC FREDERICIA: 476 MS
R AXIS: 71 DEGREES
RBC # UR STRIP.AUTO: NEGATIVE MG/DL
RBC #/AREA URNS AUTO: NORMAL /HPF
SP GR UR STRIP.AUTO: 1.02
T AXIS: -55 DEGREES
T OFFSET: 445 MS
UROBILINOGEN UR STRIP.AUTO-MCNC: NORMAL MG/DL
VENTRICULAR RATE: 73 BPM
WBC #/AREA URNS AUTO: NORMAL /HPF

## 2025-05-28 PROCEDURE — 99203 OFFICE O/P NEW LOW 30 MIN: CPT | Performed by: NURSE PRACTITIONER

## 2025-05-28 PROCEDURE — 70450 CT HEAD/BRAIN W/O DYE: CPT

## 2025-05-28 PROCEDURE — 81001 URINALYSIS AUTO W/SCOPE: CPT | Performed by: EMERGENCY MEDICINE

## 2025-05-28 PROCEDURE — 93005 ELECTROCARDIOGRAM TRACING: CPT

## 2025-05-28 PROCEDURE — 80307 DRUG TEST PRSMV CHEM ANLYZR: CPT | Performed by: EMERGENCY MEDICINE

## 2025-05-28 PROCEDURE — 70450 CT HEAD/BRAIN W/O DYE: CPT | Performed by: RADIOLOGY

## 2025-05-28 ASSESSMENT — LIFESTYLE VARIABLES
TOTAL SCORE: 0
EVER FELT BAD OR GUILTY ABOUT YOUR DRINKING: NO
HAVE YOU EVER FELT YOU SHOULD CUT DOWN ON YOUR DRINKING: NO
EVER HAD A DRINK FIRST THING IN THE MORNING TO STEADY YOUR NERVES TO GET RID OF A HANGOVER: NO
HAVE PEOPLE ANNOYED YOU BY CRITICIZING YOUR DRINKING: NO

## 2025-05-28 NOTE — ED PROVIDER NOTES
"  Emergency Department Provider Note       History of Present Illness     History provided by: Patient  Limitations to History: poor historian  External Records Reviewed with Brief Summary: none    HPI:  Kriss Malloy is a 49 y.o. female BIBEMS for generalized weakness/feeling unwell - EMS was called bc patililiya was lying on the ground for 3hr.  Patient poor historian and just repeats \"I don't feel right\".  She does admit to abd pain without n/v/d or fever.  Review of EMR notes several ED visits for Chilmark's disease when she runs out of her meds and feels weak (usually prescribed by the ED as she is not following up well as outpatient with endocrinology).  Pt denies SI/HI.    Physical Exam   Triage vitals:  T 36.2 °C (97.2 °F)  HR 92  /79  RR 16  O2 96 % None (Room air)    General: Awake, alert, in no acute distress  Eyes: Gaze conjugate.  No scleral icterus or injection  HENT: Normo-cephalic, atraumatic. No stridor  CV: Regular rate, regular rhythm  Resp: Breathing non-labored, speaking in full sentences.  Clear to auscultation bilaterally  GI: Soft, non-distended, infraumbilical abd pain, no rebound or guarding.  MSK/Extremities: No gross bony deformities. Moving all extremities  Skin: Warm. Appropriate color  Neuro: Alert. Oriented. Face symmetric. Speech is fluent.  Gross strength and sensation intact in b/l UE and LEs  Psych: guarded affect with poor eye contact, vague answers      Medical Decision Making & ED Course   Medical Decision Makin y.o. female ***  ----  {Scoring Tools Utilized:79102}    Differential diagnoses considered include but are not limited to: ***    Social Determinants of Health which Significantly Impact Care: {Social Determinants of Health which Significantly Impact Care:70369} {The following actions were taken to address these social determinants (Optional):62273}    EKG Independent Interpretation: {EKG Independent Interpretation:40533}    Independent Result Review " "and Interpretation: {Independent Result Review and Interpretation:28464::\"Relevant laboratory and radiographic results were reviewed and independently interpreted by myself.  As necessary, they are commented on in the ED Course.\"}    Chronic conditions affecting the patient's care: {Chronic conditions affecting the patient's care:54921::\"As documented above in MDM\"}    The patient was discussed with the following consultants/services: {The patient was discussed with the following consultants/services:51033}    Care Considerations: {Care Considerations:14367::\"As documented above in MDM\"}    ED Course:       Disposition   {ED Disposition:36449}    Procedures   Procedures    {ED Provider Level (Optional):34424}    Elyse H Klerman, MD  Emergency Medicine                                                         " 06/08/25 2210   Agitation   Generalized weakness       Disposition   Patient was signed out to Dr. Marie at 0700 pending completion of their work-up.  Please see the next provider's transition of care note for the remainder of the patient's care.     Procedures   Critical Care    Performed by: Elyse H Klerman, MD  Authorized by: Tierney Marie MD    Critical care provider statement:     Critical care time (minutes):  55    Critical care time was exclusive of:  Separately billable procedures and treating other patients    Critical care was necessary to treat or prevent imminent or life-threatening deterioration of the following conditions:  Trauma    Critical care was time spent personally by me on the following activities:  Development of treatment plan with patient or surrogate, discussions with consultants, examination of patient, obtaining history from patient or surrogate, ordering and performing treatments and interventions, ordering and review of laboratory studies, ordering and review of radiographic studies and review of old charts  Comments:      SAH (likely traumatic) requiring q1h neuro checks, trauma/NSGY consults, repeat imaging, close monitoring          Elyse H Klerman, MD  Emergency Medicine                                                            Elyse H Klerman, MD  06/08/25 1816

## 2025-05-28 NOTE — ED NOTES
Writer discussed discharge information with patient. Patient verbalized understanding. Questions answered. No acute distress upon discharge.      Oksana Lagunas RN  05/28/25 8840

## 2025-05-28 NOTE — ED TRIAGE NOTES
"Pt arrived via EMS. Pt was found outside of [facility] having laid there for 3 hours. EMS states that she has a possible psych history. Pt does not endorse any intention of harming herself or others. Only medical complaint is that she says she feels \"a little weak\". Denies CP, SOB, of dyspnea.  "

## 2025-05-28 NOTE — ED PROVIDER NOTES
Continuation of care:     Patient is a 49-year-old female found lying on the ground.  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  Laboratory studies here demonstrating a mild hyponatremia of 135.  Initial head CT was concerning for possible subarachnoid hemorrhage, however repeat scan at the 6-hour caleb is without evidence of bleed.  Neurosurgery was consulted and requesting CTA, however patient declining.  Trauma surgery requesting CT of the chest, however patient declines.  Chest x-ray ordered, however patient declines chest x-ray.    Repeat imaging discussed with neurosurgery and they will be signing off as the patient does not have any evidence of intracranial hemorrhage.  Because the patient has no evidence of traumatic injury trauma surgery will also be signing off.    At this time the patient does not have any laboratory or imaging abnormalities that would require admission to the hospital, therefore she will be discharged home    MD Tierney Contreras MD  05/29/25 2632

## 2025-05-28 NOTE — CONSULTS
Reason For Consult  SAH    History Of Present Illness  Kriss Malloy is a 49 y.o. female presenting with weakness.  [facility] of [facility] after laying there for over 3 hours.  It is uncertain whether the patient fell as she is a poor historian.  Initial imaging demonstrated a small to trace subarachnoid hemorrhage along the right cerebellar hemisphere.  Neurosurgery consulted.     Past Medical History  She has a past medical history of Melchor disease (Multi).    Surgical History  She has no past surgical history on file.     Social History  She reports that she has quit smoking. Her smoking use included cigarettes. She does not have any smokeless tobacco history on file. She reports that she does not currently use alcohol. She reports that she does not currently use drugs.    Family History  Family History[1]     Allergies  Patient has no known allergies.    Review of Systems  Patient denies headache, vision changes, shortness of breath, chest pain, abdominal pain, numbness or tingling in arms or legs, bowel or bladder changes.       Physical Exam  General: Well developed, awake/alert/oriented x3, no distress, alert and cooperative  Skin: Warm and dry, no lesions, no rashes  ENMT: Mucous membranes moist, no apparent injury, no lesions seen  Head/Neck: Neck Supple, no apparent injury  Respiratory/Thorax: Normal breath sounds with good chest expansion, thorax symmetric  Cardiovascular: No pitting edema, no JVD    Cranial nerves   II/III: Visual fields are full. Pupils are reactive bilaterally.  III/IV/VI: Extraocular movements are full with no nystagmus.   V: Facial sensation is intact to light touch.  VII: Face is symmetric.  VIII: hearing is intact bilaterally.  IX/X: Palate elevates symmetrically to phonation.  XI: Sternocleidomastoid is 5/5 to strength testing.  XII: Tongue is midline.    Nate Coma Scale  Best Eye Response: 4: Opens eyes spontaneously   Best Verbal Response: 5: Oriented,  converses normally   Best Motor Response: 6: Obeys commands   Nate Coma Scale Score: 15    Motor Strength: 5/5 Throughout bilateral upper extremities and bilateral lower extremities    Muscle Bulk: Normal and symmetric in all extremities     Paraspinal muscle spasm/tenderness absent    Midline tenderness absent    Sensation to light touch intact       Last Recorded Vitals  Blood pressure 105/67, pulse 87, temperature 36.2 °C (97.2 °F), temperature source Temporal, resp. rate 16, SpO2 97%.    Relevant Results  I personally reviewed the CT of the head from 05/27/2025 at 2159 that does demonstrate a trace subarachnoid hemorrhage along the right sella Ariane hemisphere that appears acute.  Also reviewed CT head from 05/28/2025 at 0503 that shows resolution of the SAH.     Assessment/Plan     Kriss Malloy is a 49 y.o. female presenting with weakness.  Patient was found outside of a [facility] after laying there for over 3 hours.  It is uncertain whether the patient fell as she is a poor historian.  Initial imaging demonstrated a small to trace subarachnoid hemorrhage along the right cerebellar hemisphere.  Neurosurgery consulted.    I personally reviewed the CT of the head from 05/27/2025 at 2159 that does demonstrate a trace subarachnoid hemorrhage along the right sella Ariane hemisphere that appears acute.  Also reviewed CT head from 05/28/2025 at 0503 that shows resolution of the SAH.  Recommended CTA of the head to evaluate for possible causes in the setting of unwitnessed possible trauma versus underlying disease.  Patient refused IV access for contrast as well as refusing CTA.    On physical exam patient is alert and orient x 3.  No focal deficits on neurologic exam.  She participated in the exam but was clearly bothered by my presence as she was trying to sleep.  She had 5/5 strength in all 4 extremities.  Denied any headaches, lightheadedness, dizziness, blurred or double vision.  She denied any neck or back  pain.  Sensation to light touch was intact.  She denies any loss of bowel or bladder control or saddle anesthesia.    Since repeat CT head demonstrated resolution of previously noted SAH no surgical interventions are warranted. Patient is denying any further testing despite recommended CTA.  Patient can follow-up outpatient as needed with neurosurgery CARIN.  Otherwise patient okay for discharge from a neurosurgery standpoint when deemed medically ready by primary team and other consulting services.  Neurosurgery signing off.    I spent 35 minutes in the professional and overall care of this patient.      Samantha A Meeson, APRN-CNP         [1] No family history on file.

## 2025-06-28 ENCOUNTER — APPOINTMENT (OUTPATIENT)
Dept: RADIOLOGY | Facility: HOSPITAL | Age: 50
End: 2025-06-28
Payer: COMMERCIAL

## 2025-06-28 ENCOUNTER — APPOINTMENT (OUTPATIENT)
Dept: CARDIOLOGY | Facility: HOSPITAL | Age: 50
End: 2025-06-28
Payer: COMMERCIAL

## 2025-06-28 ENCOUNTER — HOSPITAL ENCOUNTER (EMERGENCY)
Facility: HOSPITAL | Age: 50
Discharge: ANOTHER HOSPITAL | End: 2025-06-28
Attending: EMERGENCY MEDICINE
Payer: COMMERCIAL

## 2025-06-28 ENCOUNTER — HOSPITAL ENCOUNTER (INPATIENT)
Facility: HOSPITAL | Age: 50
End: 2025-06-28
Attending: INTERNAL MEDICINE | Admitting: HOSPITALIST
Payer: COMMERCIAL

## 2025-06-28 VITALS
HEART RATE: 65 BPM | BODY MASS INDEX: 17.2 KG/M2 | HEIGHT: 67 IN | SYSTOLIC BLOOD PRESSURE: 129 MMHG | WEIGHT: 109.57 LBS | OXYGEN SATURATION: 100 % | DIASTOLIC BLOOD PRESSURE: 86 MMHG | TEMPERATURE: 98 F | RESPIRATION RATE: 16 BRPM

## 2025-06-28 DIAGNOSIS — F20.3 UNDIFFERENTIATED SCHIZOPHRENIA (MULTI): Chronic | ICD-10-CM

## 2025-06-28 DIAGNOSIS — E83.51 HYPOCALCEMIA: ICD-10-CM

## 2025-06-28 DIAGNOSIS — R53.1 GENERALIZED WEAKNESS: ICD-10-CM

## 2025-06-28 DIAGNOSIS — R20.2 PARESTHESIA: ICD-10-CM

## 2025-06-28 DIAGNOSIS — E27.1 ADDISON'S DISEASE (MULTI): ICD-10-CM

## 2025-06-28 DIAGNOSIS — F41.9 ANXIETY: ICD-10-CM

## 2025-06-28 DIAGNOSIS — E83.51 HYPOCALCEMIA: Primary | ICD-10-CM

## 2025-06-28 DIAGNOSIS — E27.1 ADDISON DISEASE (MULTI): ICD-10-CM

## 2025-06-28 DIAGNOSIS — E87.6 HYPOKALEMIA: ICD-10-CM

## 2025-06-28 DIAGNOSIS — R06.02 SOB (SHORTNESS OF BREATH): ICD-10-CM

## 2025-06-28 DIAGNOSIS — E27.2: Primary | ICD-10-CM

## 2025-06-28 DIAGNOSIS — R79.89 ELEVATED D-DIMER: ICD-10-CM

## 2025-06-28 DIAGNOSIS — R94.31 PROLONGED Q-T INTERVAL ON ECG: ICD-10-CM

## 2025-06-28 DIAGNOSIS — K21.9 GASTROESOPHAGEAL REFLUX DISEASE, UNSPECIFIED WHETHER ESOPHAGITIS PRESENT: ICD-10-CM

## 2025-06-28 LAB
ALBUMIN SERPL BCP-MCNC: 3.1 G/DL (ref 3.4–5)
ALP SERPL-CCNC: 54 U/L (ref 33–110)
ALT SERPL W P-5'-P-CCNC: 17 U/L (ref 7–45)
ANION GAP SERPL CALC-SCNC: 13 MMOL/L (ref 10–20)
ANION GAP SERPL CALC-SCNC: 14 MMOL/L (ref 10–20)
APPEARANCE UR: CLEAR
AST SERPL W P-5'-P-CCNC: 13 U/L (ref 9–39)
BASOPHILS # BLD AUTO: 0.01 X10*3/UL (ref 0–0.1)
BASOPHILS NFR BLD AUTO: 0.1 %
BILIRUB SERPL-MCNC: 0.3 MG/DL (ref 0–1.2)
BILIRUB UR STRIP.AUTO-MCNC: NEGATIVE MG/DL
BNP SERPL-MCNC: 198 PG/ML (ref 0–99)
BUN SERPL-MCNC: 15 MG/DL (ref 6–23)
BUN SERPL-MCNC: 19 MG/DL (ref 6–23)
CA-I BLD-SCNC: 0.73 MMOL/L (ref 1.1–1.33)
CA-I BLD-SCNC: NORMAL MMOL/L
CALCIUM SERPL-MCNC: 5.5 MG/DL (ref 8.6–10.3)
CALCIUM SERPL-MCNC: 5.8 MG/DL (ref 8.6–10.3)
CARDIAC TROPONIN I PNL SERPL HS: 3 NG/L (ref 0–13)
CHLORIDE SERPL-SCNC: 105 MMOL/L (ref 98–107)
CHLORIDE SERPL-SCNC: 106 MMOL/L (ref 98–107)
CO2 SERPL-SCNC: 25 MMOL/L (ref 21–32)
CO2 SERPL-SCNC: 27 MMOL/L (ref 21–32)
COLOR UR: YELLOW
CREAT SERPL-MCNC: 0.53 MG/DL (ref 0.5–1.05)
CREAT SERPL-MCNC: 0.57 MG/DL (ref 0.5–1.05)
D DIMER PPP FEU-MCNC: 1099 NG/ML FEU
EGFRCR SERPLBLD CKD-EPI 2021: >90 ML/MIN/1.73M*2
EGFRCR SERPLBLD CKD-EPI 2021: >90 ML/MIN/1.73M*2
EOSINOPHIL # BLD AUTO: 0.01 X10*3/UL (ref 0–0.7)
EOSINOPHIL NFR BLD AUTO: 0.1 %
ERYTHROCYTE [DISTWIDTH] IN BLOOD BY AUTOMATED COUNT: 14.6 % (ref 11.5–14.5)
GLUCOSE SERPL-MCNC: 121 MG/DL (ref 74–99)
GLUCOSE SERPL-MCNC: 96 MG/DL (ref 74–99)
GLUCOSE UR STRIP.AUTO-MCNC: NORMAL MG/DL
HCT VFR BLD AUTO: 28.6 % (ref 36–46)
HGB BLD-MCNC: 9.6 G/DL (ref 12–16)
IMM GRANULOCYTES # BLD AUTO: 0.03 X10*3/UL (ref 0–0.7)
IMM GRANULOCYTES NFR BLD AUTO: 0.3 % (ref 0–0.9)
KETONES UR STRIP.AUTO-MCNC: NEGATIVE MG/DL
LEUKOCYTE ESTERASE UR QL STRIP.AUTO: ABNORMAL
LYMPHOCYTES # BLD AUTO: 2.12 X10*3/UL (ref 1.2–4.8)
LYMPHOCYTES NFR BLD AUTO: 21.6 %
MAGNESIUM SERPL-MCNC: 1.28 MG/DL (ref 1.6–2.4)
MAGNESIUM SERPL-MCNC: 1.53 MG/DL (ref 1.6–2.4)
MCH RBC QN AUTO: 29.5 PG (ref 26–34)
MCHC RBC AUTO-ENTMCNC: 33.6 G/DL (ref 32–36)
MCV RBC AUTO: 88 FL (ref 80–100)
MONOCYTES # BLD AUTO: 0.93 X10*3/UL (ref 0.1–1)
MONOCYTES NFR BLD AUTO: 9.5 %
MUCOUS THREADS #/AREA URNS AUTO: ABNORMAL /LPF
NEUTROPHILS # BLD AUTO: 6.7 X10*3/UL (ref 1.2–7.7)
NEUTROPHILS NFR BLD AUTO: 68.4 %
NITRITE UR QL STRIP.AUTO: NEGATIVE
NRBC BLD-RTO: 0 /100 WBCS (ref 0–0)
PH UR STRIP.AUTO: 7 [PH]
PLATELET # BLD AUTO: 198 X10*3/UL (ref 150–450)
POTASSIUM SERPL-SCNC: 3.1 MMOL/L (ref 3.5–5.3)
POTASSIUM SERPL-SCNC: 4 MMOL/L (ref 3.5–5.3)
PROT SERPL-MCNC: 5.1 G/DL (ref 6.4–8.2)
PROT UR STRIP.AUTO-MCNC: NEGATIVE MG/DL
RBC # BLD AUTO: 3.25 X10*6/UL (ref 4–5.2)
RBC # UR STRIP.AUTO: NEGATIVE MG/DL
RBC #/AREA URNS AUTO: ABNORMAL /HPF
SODIUM SERPL-SCNC: 140 MMOL/L (ref 136–145)
SODIUM SERPL-SCNC: 143 MMOL/L (ref 136–145)
SP GR UR STRIP.AUTO: 1.04
SQUAMOUS #/AREA URNS AUTO: ABNORMAL /HPF
UROBILINOGEN UR STRIP.AUTO-MCNC: NORMAL MG/DL
WBC # BLD AUTO: 9.8 X10*3/UL (ref 4.4–11.3)
WBC #/AREA URNS AUTO: ABNORMAL /HPF

## 2025-06-28 PROCEDURE — 99291 CRITICAL CARE FIRST HOUR: CPT | Mod: 25

## 2025-06-28 PROCEDURE — 99285 EMERGENCY DEPT VISIT HI MDM: CPT | Mod: 25

## 2025-06-28 PROCEDURE — 80048 BASIC METABOLIC PNL TOTAL CA: CPT | Performed by: HOSPITALIST

## 2025-06-28 PROCEDURE — 84075 ASSAY ALKALINE PHOSPHATASE: CPT | Performed by: EMERGENCY MEDICINE

## 2025-06-28 PROCEDURE — 71275 CT ANGIOGRAPHY CHEST: CPT

## 2025-06-28 PROCEDURE — 83735 ASSAY OF MAGNESIUM: CPT | Performed by: HOSPITALIST

## 2025-06-28 PROCEDURE — 82330 ASSAY OF CALCIUM: CPT | Performed by: EMERGENCY MEDICINE

## 2025-06-28 PROCEDURE — 84484 ASSAY OF TROPONIN QUANT: CPT | Performed by: EMERGENCY MEDICINE

## 2025-06-28 PROCEDURE — 85025 COMPLETE CBC W/AUTO DIFF WBC: CPT | Performed by: EMERGENCY MEDICINE

## 2025-06-28 PROCEDURE — 96367 TX/PROPH/DG ADDL SEQ IV INF: CPT

## 2025-06-28 PROCEDURE — 36415 COLL VENOUS BLD VENIPUNCTURE: CPT | Performed by: EMERGENCY MEDICINE

## 2025-06-28 PROCEDURE — 93005 ELECTROCARDIOGRAM TRACING: CPT

## 2025-06-28 PROCEDURE — 96366 THER/PROPH/DIAG IV INF ADDON: CPT

## 2025-06-28 PROCEDURE — 2500000004 HC RX 250 GENERAL PHARMACY W/ HCPCS (ALT 636 FOR OP/ED): Mod: JZ,SE | Performed by: EMERGENCY MEDICINE

## 2025-06-28 PROCEDURE — 83735 ASSAY OF MAGNESIUM: CPT | Performed by: EMERGENCY MEDICINE

## 2025-06-28 PROCEDURE — 2500000004 HC RX 250 GENERAL PHARMACY W/ HCPCS (ALT 636 FOR OP/ED): Mod: SE | Performed by: EMERGENCY MEDICINE

## 2025-06-28 PROCEDURE — 81001 URINALYSIS AUTO W/SCOPE: CPT | Performed by: EMERGENCY MEDICINE

## 2025-06-28 PROCEDURE — 99223 1ST HOSP IP/OBS HIGH 75: CPT | Performed by: HOSPITALIST

## 2025-06-28 PROCEDURE — 71045 X-RAY EXAM CHEST 1 VIEW: CPT | Performed by: RADIOLOGY

## 2025-06-28 PROCEDURE — 2550000001 HC RX 255 CONTRASTS: Mod: JZ,SE | Performed by: EMERGENCY MEDICINE

## 2025-06-28 PROCEDURE — 87086 URINE CULTURE/COLONY COUNT: CPT | Mod: GENLAB | Performed by: EMERGENCY MEDICINE

## 2025-06-28 PROCEDURE — 2500000004 HC RX 250 GENERAL PHARMACY W/ HCPCS (ALT 636 FOR OP/ED): Mod: JZ | Performed by: HOSPITALIST

## 2025-06-28 PROCEDURE — 96361 HYDRATE IV INFUSION ADD-ON: CPT

## 2025-06-28 PROCEDURE — 82330 ASSAY OF CALCIUM: CPT | Performed by: HOSPITALIST

## 2025-06-28 PROCEDURE — 2500000005 HC RX 250 GENERAL PHARMACY W/O HCPCS: Performed by: NURSE PRACTITIONER

## 2025-06-28 PROCEDURE — 99291 CRITICAL CARE FIRST HOUR: CPT | Performed by: EMERGENCY MEDICINE

## 2025-06-28 PROCEDURE — 85379 FIBRIN DEGRADATION QUANT: CPT | Performed by: EMERGENCY MEDICINE

## 2025-06-28 PROCEDURE — 96368 THER/DIAG CONCURRENT INF: CPT

## 2025-06-28 PROCEDURE — 2500000001 HC RX 250 WO HCPCS SELF ADMINISTERED DRUGS (ALT 637 FOR MEDICARE OP): Mod: SE | Performed by: EMERGENCY MEDICINE

## 2025-06-28 PROCEDURE — 2500000005 HC RX 250 GENERAL PHARMACY W/O HCPCS: Mod: SE | Performed by: EMERGENCY MEDICINE

## 2025-06-28 PROCEDURE — 2500000002 HC RX 250 W HCPCS SELF ADMINISTERED DRUGS (ALT 637 FOR MEDICARE OP, ALT 636 FOR OP/ED): Mod: SE | Performed by: EMERGENCY MEDICINE

## 2025-06-28 PROCEDURE — 96365 THER/PROPH/DIAG IV INF INIT: CPT | Mod: 59

## 2025-06-28 PROCEDURE — 71045 X-RAY EXAM CHEST 1 VIEW: CPT

## 2025-06-28 PROCEDURE — 2500000004 HC RX 250 GENERAL PHARMACY W/ HCPCS (ALT 636 FOR OP/ED): Mod: SE

## 2025-06-28 PROCEDURE — 71275 CT ANGIOGRAPHY CHEST: CPT | Mod: FOREIGN READ | Performed by: RADIOLOGY

## 2025-06-28 PROCEDURE — 83880 ASSAY OF NATRIURETIC PEPTIDE: CPT | Performed by: EMERGENCY MEDICINE

## 2025-06-28 PROCEDURE — 1200000002 HC GENERAL ROOM WITH TELEMETRY DAILY

## 2025-06-28 PROCEDURE — 96375 TX/PRO/DX INJ NEW DRUG ADDON: CPT | Mod: 59

## 2025-06-28 RX ORDER — CALCIUM GLUCONATE 20 MG/ML
1 INJECTION, SOLUTION INTRAVENOUS ONCE
Status: COMPLETED | OUTPATIENT
Start: 2025-06-28 | End: 2025-06-28

## 2025-06-28 RX ORDER — SODIUM CHLORIDE 9 MG/ML
125 INJECTION, SOLUTION INTRAVENOUS CONTINUOUS
Status: DISCONTINUED | OUTPATIENT
Start: 2025-06-28 | End: 2025-06-28 | Stop reason: HOSPADM

## 2025-06-28 RX ORDER — FLUDROCORTISONE ACETATE 0.1 MG/1
0.1 TABLET ORAL DAILY
Status: DISCONTINUED | OUTPATIENT
Start: 2025-06-28 | End: 2025-06-28 | Stop reason: HOSPADM

## 2025-06-28 RX ORDER — TALC
3 POWDER (GRAM) TOPICAL NIGHTLY
Status: DISCONTINUED | OUTPATIENT
Start: 2025-06-28 | End: 2025-06-28 | Stop reason: HOSPADM

## 2025-06-28 RX ORDER — FLUDROCORTISONE ACETATE 0.1 MG/1
0.1 TABLET ORAL DAILY
Status: DISCONTINUED | OUTPATIENT
Start: 2025-06-29 | End: 2025-07-03 | Stop reason: HOSPADM

## 2025-06-28 RX ORDER — FERROUS SULFATE 325(65) MG
65 TABLET ORAL
Status: DISCONTINUED | OUTPATIENT
Start: 2025-06-28 | End: 2025-06-28 | Stop reason: HOSPADM

## 2025-06-28 RX ORDER — POTASSIUM CHLORIDE 14.9 MG/ML
20 INJECTION INTRAVENOUS
Status: COMPLETED | OUTPATIENT
Start: 2025-06-28 | End: 2025-06-28

## 2025-06-28 RX ORDER — HYDROCORTISONE 10 MG/1
10 TABLET ORAL NIGHTLY
Status: DISCONTINUED | OUTPATIENT
Start: 2025-06-28 | End: 2025-06-28

## 2025-06-28 RX ORDER — HYDROXYZINE HYDROCHLORIDE 25 MG/1
25 TABLET, FILM COATED ORAL EVERY 6 HOURS PRN
Status: DISCONTINUED | OUTPATIENT
Start: 2025-06-28 | End: 2025-06-28 | Stop reason: HOSPADM

## 2025-06-28 RX ORDER — DIAZEPAM 5 MG/ML
5 INJECTION, SOLUTION INTRAMUSCULAR; INTRAVENOUS ONCE
Status: DISCONTINUED | OUTPATIENT
Start: 2025-06-28 | End: 2025-06-28

## 2025-06-28 RX ORDER — MAGNESIUM SULFATE HEPTAHYDRATE 40 MG/ML
2 INJECTION, SOLUTION INTRAVENOUS ONCE
Status: COMPLETED | OUTPATIENT
Start: 2025-06-28 | End: 2025-06-28

## 2025-06-28 RX ORDER — HYDROCORTISONE 10 MG/1
15 TABLET ORAL 2 TIMES DAILY
Status: DISCONTINUED | OUTPATIENT
Start: 2025-06-28 | End: 2025-06-28

## 2025-06-28 RX ORDER — HYDROXYZINE PAMOATE 25 MG/1
25 CAPSULE ORAL EVERY 6 HOURS PRN
COMMUNITY

## 2025-06-28 RX ORDER — CALCITRIOL 0.25 UG/1
0.25 CAPSULE ORAL DAILY
Status: DISCONTINUED | OUTPATIENT
Start: 2025-06-28 | End: 2025-06-28 | Stop reason: HOSPADM

## 2025-06-28 RX ORDER — POTASSIUM CHLORIDE 750 MG/1
10 TABLET, FILM COATED, EXTENDED RELEASE ORAL ONCE
Status: DISCONTINUED | OUTPATIENT
Start: 2025-06-28 | End: 2025-06-28 | Stop reason: HOSPADM

## 2025-06-28 RX ORDER — HYDROCORTISONE 10 MG/1
15 TABLET ORAL 2 TIMES DAILY
Status: DISCONTINUED | OUTPATIENT
Start: 2025-06-28 | End: 2025-06-28 | Stop reason: HOSPADM

## 2025-06-28 RX ORDER — POTASSIUM CHLORIDE 20 MEQ/1
40 TABLET, EXTENDED RELEASE ORAL DAILY
Status: DISCONTINUED | OUTPATIENT
Start: 2025-06-28 | End: 2025-06-28 | Stop reason: HOSPADM

## 2025-06-28 RX ORDER — HALOPERIDOL 5 MG/1
5 TABLET ORAL 2 TIMES DAILY PRN
COMMUNITY

## 2025-06-28 RX ORDER — DEXTROSE MONOHYDRATE AND SODIUM CHLORIDE 5; .9 G/100ML; G/100ML
50 INJECTION, SOLUTION INTRAVENOUS CONTINUOUS
Status: DISCONTINUED | OUTPATIENT
Start: 2025-06-28 | End: 2025-06-28

## 2025-06-28 RX ORDER — CALCIUM CHLORIDE INJECTION 100 MG/ML
INJECTION, SOLUTION INTRAVENOUS
Status: COMPLETED
Start: 2025-06-28 | End: 2025-06-28

## 2025-06-28 RX ORDER — HYDROCORTISONE 10 MG/1
10 TABLET ORAL
Status: DISCONTINUED | OUTPATIENT
Start: 2025-06-29 | End: 2025-06-28

## 2025-06-28 RX ORDER — TALC
3 POWDER (GRAM) TOPICAL NIGHTLY PRN
Status: DISCONTINUED | OUTPATIENT
Start: 2025-06-28 | End: 2025-07-03 | Stop reason: HOSPADM

## 2025-06-28 RX ORDER — SODIUM CHLORIDE AND POTASSIUM CHLORIDE 150; 900 MG/100ML; MG/100ML
75 INJECTION, SOLUTION INTRAVENOUS CONTINUOUS
Status: DISCONTINUED | OUTPATIENT
Start: 2025-06-28 | End: 2025-06-30

## 2025-06-28 RX ORDER — PANTOPRAZOLE SODIUM 40 MG/1
40 TABLET, DELAYED RELEASE ORAL
Status: DISCONTINUED | OUTPATIENT
Start: 2025-06-29 | End: 2025-07-03 | Stop reason: HOSPADM

## 2025-06-28 RX ORDER — RISPERIDONE 1 MG/1
0.5 TABLET ORAL 2 TIMES DAILY
Status: DISCONTINUED | OUTPATIENT
Start: 2025-06-28 | End: 2025-06-28 | Stop reason: HOSPADM

## 2025-06-28 RX ORDER — CALCITRIOL 0.25 UG/1
0.25 CAPSULE ORAL DAILY
Status: DISCONTINUED | OUTPATIENT
Start: 2025-06-29 | End: 2025-06-28

## 2025-06-28 RX ORDER — CHOLECALCIFEROL (VITAMIN D3) 25 MCG
125 TABLET ORAL DAILY
Status: DISCONTINUED | OUTPATIENT
Start: 2025-06-29 | End: 2025-07-03 | Stop reason: HOSPADM

## 2025-06-28 RX ORDER — HYDROCORTISONE 10 MG/1
20 TABLET ORAL EVERY MORNING
Status: DISCONTINUED | OUTPATIENT
Start: 2025-06-29 | End: 2025-06-28

## 2025-06-28 RX ORDER — RISPERIDONE 0.5 MG/1
0.5 TABLET ORAL 2 TIMES DAILY
Status: DISCONTINUED | OUTPATIENT
Start: 2025-06-28 | End: 2025-06-29

## 2025-06-28 RX ORDER — PALIPERIDONE 3 MG/1
3 TABLET, EXTENDED RELEASE ORAL NIGHTLY
COMMUNITY

## 2025-06-28 RX ORDER — ACETAMINOPHEN 500 MG
125 TABLET ORAL DAILY
Status: DISCONTINUED | OUTPATIENT
Start: 2025-06-28 | End: 2025-06-28 | Stop reason: HOSPADM

## 2025-06-28 RX ORDER — CALCITRIOL 0.25 UG/1
0.25 CAPSULE ORAL DAILY
Status: DISCONTINUED | OUTPATIENT
Start: 2025-06-29 | End: 2025-06-29

## 2025-06-28 RX ORDER — MAGNESIUM SULFATE HEPTAHYDRATE 40 MG/ML
INJECTION, SOLUTION INTRAVENOUS
Status: COMPLETED
Start: 2025-06-28 | End: 2025-06-28

## 2025-06-28 RX ORDER — QUETIAPINE FUMARATE 25 MG/1
25 TABLET, FILM COATED ORAL NIGHTLY
Status: DISCONTINUED | OUTPATIENT
Start: 2025-06-28 | End: 2025-06-29

## 2025-06-28 RX ADMIN — CALCIUM GLUCONATE 1 G: 20 INJECTION, SOLUTION INTRAVENOUS at 15:54

## 2025-06-28 RX ADMIN — SODIUM CHLORIDE AND POTASSIUM CHLORIDE 75 ML/HR: 9; 1.49 INJECTION, SOLUTION INTRAVENOUS at 15:54

## 2025-06-28 RX ADMIN — RISPERIDONE 0.5 MG: 1 TABLET, FILM COATED ORAL at 10:19

## 2025-06-28 RX ADMIN — CALCIUM CHLORIDE 1 G: 100 INJECTION, SOLUTION INTRAVENOUS at 07:06

## 2025-06-28 RX ADMIN — Medication 3 MG: at 21:17

## 2025-06-28 RX ADMIN — MAGNESIUM SULFATE HEPTAHYDRATE 2 G: 2 INJECTION, SOLUTION INTRAVENOUS at 16:28

## 2025-06-28 RX ADMIN — FLUDROCORTISONE ACETATE 0.1 MG: 0.1 TABLET ORAL at 10:22

## 2025-06-28 RX ADMIN — POTASSIUM CHLORIDE 40 MEQ: 1500 TABLET, EXTENDED RELEASE ORAL at 08:40

## 2025-06-28 RX ADMIN — SODIUM CHLORIDE 1000 ML: 9 INJECTION, SOLUTION INTRAVENOUS at 06:16

## 2025-06-28 RX ADMIN — MAGNESIUM SULFATE HEPTAHYDRATE 2 G: 40 INJECTION, SOLUTION INTRAVENOUS at 08:46

## 2025-06-28 RX ADMIN — HYDROCORTISONE SODIUM SUCCINATE 25 MG: 100 INJECTION, POWDER, FOR SOLUTION INTRAMUSCULAR; INTRAVENOUS at 15:54

## 2025-06-28 RX ADMIN — FERROUS SULFATE TAB 325 MG (65 MG ELEMENTAL FE) 1 TABLET: 325 (65 FE) TAB at 10:20

## 2025-06-28 RX ADMIN — POTASSIUM CHLORIDE 20 MEQ: 14.9 INJECTION, SOLUTION INTRAVENOUS at 11:29

## 2025-06-28 RX ADMIN — POTASSIUM CHLORIDE 20 MEQ: 14.9 INJECTION, SOLUTION INTRAVENOUS at 07:55

## 2025-06-28 RX ADMIN — CHOLECALCIFEROL TAB 125 MCG (5000 UNIT) 125 MCG: 125 TAB at 10:20

## 2025-06-28 RX ADMIN — HYDROCORTISONE SODIUM SUCCINATE 25 MG: 100 INJECTION, POWDER, FOR SOLUTION INTRAMUSCULAR; INTRAVENOUS at 23:32

## 2025-06-28 RX ADMIN — HYDROCORTISONE SODIUM SUCCINATE 100 MG: 100 INJECTION, POWDER, FOR SOLUTION INTRAMUSCULAR; INTRAVENOUS at 06:54

## 2025-06-28 RX ADMIN — CALCITRIOL CAPSULES 0.25 MCG 0.25 MCG: 0.25 CAPSULE ORAL at 10:18

## 2025-06-28 RX ADMIN — IOHEXOL 75 ML: 350 INJECTION, SOLUTION INTRAVENOUS at 08:14

## 2025-06-28 SDOH — ECONOMIC STABILITY: HOUSING INSECURITY: IN THE LAST 12 MONTHS, WAS THERE A TIME WHEN YOU WERE NOT ABLE TO PAY THE MORTGAGE OR RENT ON TIME?: NO

## 2025-06-28 SDOH — ECONOMIC STABILITY: TRANSPORTATION INSECURITY: IN THE PAST 12 MONTHS, HAS LACK OF TRANSPORTATION KEPT YOU FROM MEDICAL APPOINTMENTS OR FROM GETTING MEDICATIONS?: YES

## 2025-06-28 SDOH — ECONOMIC STABILITY: FOOD INSECURITY: WITHIN THE PAST 12 MONTHS, YOU WORRIED THAT YOUR FOOD WOULD RUN OUT BEFORE YOU GOT THE MONEY TO BUY MORE.: NEVER TRUE

## 2025-06-28 SDOH — SOCIAL STABILITY: SOCIAL INSECURITY: ARE YOU OR HAVE YOU BEEN THREATENED OR ABUSED PHYSICALLY, EMOTIONALLY, OR SEXUALLY BY ANYONE?: NO

## 2025-06-28 SDOH — SOCIAL STABILITY: SOCIAL INSECURITY: HAVE YOU HAD THOUGHTS OF HARMING ANYONE ELSE?: NO

## 2025-06-28 SDOH — SOCIAL STABILITY: SOCIAL INSECURITY: WITHIN THE LAST YEAR, HAVE YOU BEEN AFRAID OF YOUR PARTNER OR EX-PARTNER?: NO

## 2025-06-28 SDOH — ECONOMIC STABILITY: FOOD INSECURITY: HOW HARD IS IT FOR YOU TO PAY FOR THE VERY BASICS LIKE FOOD, HOUSING, MEDICAL CARE, AND HEATING?: SOMEWHAT HARD

## 2025-06-28 SDOH — ECONOMIC STABILITY: FOOD INSECURITY: WITHIN THE PAST 12 MONTHS, THE FOOD YOU BOUGHT JUST DIDN'T LAST AND YOU DIDN'T HAVE MONEY TO GET MORE.: NEVER TRUE

## 2025-06-28 SDOH — ECONOMIC STABILITY: INCOME INSECURITY: IN THE PAST 12 MONTHS HAS THE ELECTRIC, GAS, OIL, OR WATER COMPANY THREATENED TO SHUT OFF SERVICES IN YOUR HOME?: NO

## 2025-06-28 SDOH — ECONOMIC STABILITY: HOUSING INSECURITY: IN THE PAST 12 MONTHS, HOW MANY TIMES HAVE YOU MOVED WHERE YOU WERE LIVING?: 0

## 2025-06-28 SDOH — SOCIAL STABILITY: SOCIAL INSECURITY: WITHIN THE LAST YEAR, HAVE YOU BEEN HUMILIATED OR EMOTIONALLY ABUSED IN OTHER WAYS BY YOUR PARTNER OR EX-PARTNER?: NO

## 2025-06-28 SDOH — SOCIAL STABILITY: SOCIAL INSECURITY: DO YOU FEEL UNSAFE GOING BACK TO THE PLACE WHERE YOU ARE LIVING?: NO

## 2025-06-28 SDOH — ECONOMIC STABILITY: HOUSING INSECURITY: AT ANY TIME IN THE PAST 12 MONTHS, WERE YOU HOMELESS OR LIVING IN A SHELTER (INCLUDING NOW)?: NO

## 2025-06-28 SDOH — SOCIAL STABILITY: SOCIAL INSECURITY: HAVE YOU HAD ANY THOUGHTS OF HARMING ANYONE ELSE?: NO

## 2025-06-28 SDOH — SOCIAL STABILITY: SOCIAL INSECURITY: DO YOU FEEL ANYONE HAS EXPLOITED OR TAKEN ADVANTAGE OF YOU FINANCIALLY OR OF YOUR PERSONAL PROPERTY?: NO

## 2025-06-28 SDOH — SOCIAL STABILITY: SOCIAL INSECURITY: DOES ANYONE TRY TO KEEP YOU FROM HAVING/CONTACTING OTHER FRIENDS OR DOING THINGS OUTSIDE YOUR HOME?: NO

## 2025-06-28 SDOH — SOCIAL STABILITY: SOCIAL INSECURITY: WERE YOU ABLE TO COMPLETE ALL THE BEHAVIORAL HEALTH SCREENINGS?: YES

## 2025-06-28 SDOH — SOCIAL STABILITY: SOCIAL INSECURITY: ARE THERE ANY APPARENT SIGNS OF INJURIES/BEHAVIORS THAT COULD BE RELATED TO ABUSE/NEGLECT?: NO

## 2025-06-28 SDOH — SOCIAL STABILITY: SOCIAL INSECURITY: ABUSE: ADULT

## 2025-06-28 SDOH — SOCIAL STABILITY: SOCIAL INSECURITY: HAS ANYONE EVER THREATENED TO HURT YOUR FAMILY OR YOUR PETS?: NO

## 2025-06-28 ASSESSMENT — COGNITIVE AND FUNCTIONAL STATUS - GENERAL
DAILY ACTIVITIY SCORE: 24
PATIENT BASELINE BEDBOUND: NO
CLIMB 3 TO 5 STEPS WITH RAILING: A LITTLE
MOBILITY SCORE: 22
WALKING IN HOSPITAL ROOM: A LITTLE

## 2025-06-28 ASSESSMENT — ACTIVITIES OF DAILY LIVING (ADL)
DRESSING YOURSELF: INDEPENDENT
FEEDING YOURSELF: INDEPENDENT
WALKS IN HOME: INDEPENDENT
TOILETING: INDEPENDENT
BATHING: INDEPENDENT
PATIENT'S MEMORY ADEQUATE TO SAFELY COMPLETE DAILY ACTIVITIES?: YES
HEARING - RIGHT EAR: FUNCTIONAL
HEARING - LEFT EAR: FUNCTIONAL
GROOMING: INDEPENDENT
ADEQUATE_TO_COMPLETE_ADL: YES
LACK_OF_TRANSPORTATION: YES
JUDGMENT_ADEQUATE_SAFELY_COMPLETE_DAILY_ACTIVITIES: YES

## 2025-06-28 ASSESSMENT — PAIN SCALES - GENERAL
PAINLEVEL_OUTOF10: 2
PAINLEVEL_OUTOF10: 4
PAINLEVEL_OUTOF10: 0 - NO PAIN

## 2025-06-28 ASSESSMENT — LIFESTYLE VARIABLES
SKIP TO QUESTIONS 9-10: 1
HOW OFTEN DO YOU HAVE A DRINK CONTAINING ALCOHOL: NEVER
AUDIT-C TOTAL SCORE: 0
HOW MANY STANDARD DRINKS CONTAINING ALCOHOL DO YOU HAVE ON A TYPICAL DAY: PATIENT DOES NOT DRINK
AUDIT-C TOTAL SCORE: 0
HOW OFTEN DO YOU HAVE 6 OR MORE DRINKS ON ONE OCCASION: NEVER

## 2025-06-28 ASSESSMENT — PAIN DESCRIPTION - LOCATION: LOCATION: CHEST

## 2025-06-28 ASSESSMENT — PAIN - FUNCTIONAL ASSESSMENT
PAIN_FUNCTIONAL_ASSESSMENT: 0-10

## 2025-06-28 ASSESSMENT — PAIN DESCRIPTION - DESCRIPTORS
DESCRIPTORS: TIGHTNESS
DESCRIPTORS: PRESSURE

## 2025-06-28 ASSESSMENT — PATIENT HEALTH QUESTIONNAIRE - PHQ9
2. FEELING DOWN, DEPRESSED OR HOPELESS: NOT AT ALL
SUM OF ALL RESPONSES TO PHQ9 QUESTIONS 1 & 2: 0
1. LITTLE INTEREST OR PLEASURE IN DOING THINGS: NOT AT ALL

## 2025-06-28 NOTE — ED TRIAGE NOTES
Patient arrives by [facility] for c/o tingling all over, chest tightness and difficulty breathing. Patient  has restless-type movement of all extremities on arrival, ,assertive with certain aspects of care such as site of iv, states she does not have chest pain but tightness, when asked to clarify where tingling is, she states all over, when asked to expand on specifics, she says 'all over means all over'

## 2025-06-28 NOTE — H&P
History and Physical        ASSESSMENT AND PLAN:     # Generalized weakness  # Hypocalcemia  # Hypomagnesemia  # History of polyglandular autoimmune deficiency (hypothyroidism, Cimarron's disease, hypocalcemia)  -P/w worsening tingling, numbness, chest pain.  -Patient endorses not taking her medications for 3 weeks.  - Calcium 5.5, magnesium 1.28, potassium 3.1    Plan:  -Admit to stepdown  -Repeat calcium level, obtain ionized calcium, repeat BMP and magnesium  - Replete as needed  - Start hydrocortisone 25 mg every 8 hours  - Restart fludrocortisone  - Restart calcitriol, vitamin D  - PT/OT    # Prolonged QT  # Chest pain, atypical  - Multifactorial, will continue to replete IV magnesium  -Chest pain atypical, CT angiogram shows no acute findings.  Serial troponins negative.  - Obtain repeat EKG  - Cardiac monitoring  - Hold Seroquel due to prolonged QT    # Pyuria  - Urinalysis abnormal  - Denies symptoms  - Continue to monitor    # Paranoid schizophrenia  # Schizoaffective disorder  - Continue risperidone, hold Seroquel for now  - Psychiatry consulted    VTE Prophylaxis: SCD      Howard Streeter MD    HISTORY OF PRESENT ILLNESS:   Chief Complaint: Weakness, numbness    History Of Present Illness:    Kriss Malloy is a 49 y.o. female with a significant past medical history of Cimarron's disease, hypothyroidism, history of hypocalcemia, hypoparathyroidism, history of paranoid schizophrenia, who was transferred to [facility] for numbness, tingling, weakness and chest pain.    She states that she has been at a psychiatric facility, and has not taken her medication for approximately 3 weeks.  She states progressively in the last couple of days she has been feeling more weak, today started complaining of tingling, and chest pain which prompted her to be brought to the emergency room at [facility].    ER course: Potassium 3.1, magnesium 1.28, calcium 5.5, D-dimer 1099;  "hydrocortisone 100 mg x 1, sodium chloride 1L, calcium chloride 1 g IV; CT angiogram showed no evidence of pulmonary embolism, mild mosaic perfusion suggestive of air trapping.    She was diagnosed with adrenal insufficiency in her 20s.  The patient reports that she takes hydrocortisone 15 mg in the morning, 10 mg in the afternoon, and 5 mg around bedtime. She also takes fludrocortisone 0.1 mcg daily.      Review of systems: 10 point review of systems is otherwise negative except as mentioned above.    PAST HISTORIES:       Past Medical History:  She has a past medical history of Ogle disease (Multi).    Past Surgical History:  She has no past surgical history on file.      Social History:  She reports that she has quit smoking. Her smoking use included cigarettes. She does not have any smokeless tobacco history on file. She reports that she does not currently use alcohol. She reports that she does not currently use drugs.    Family History:  Family History[1]     Allergies:  Patient has no known allergies.    OBJECTIVE:       Last Recorded Vitals:  Vitals:    06/28/25 1415   BP: 122/78   BP Location: Left arm   Patient Position: Sitting   Pulse: 55   Resp: 14   Temp: 36.6 °C (97.8 °F)   TempSrc: Temporal   SpO2: 100%   Weight: 55.2 kg (121 lb 11.1 oz)   Height: 1.689 m (5' 6.5\")       Last I/O:  No intake/output data recorded.    Physical Exam    PHYSICAL EXAM:   GENERAL: Laying in bed, does not appear to be in any distress.   HEENT: HEAD: Normocephalic atraumatic.  Neck: Supple.  Eyes: Pupils are reactive to direct light.   CVS: S1, S2 heard. Regular rate and rhythm  LUNGS: Clear to auscultate bilaterally. No wheezing or rhonchi appreciated.  ABDOMEN: Soft, nontender to palpate. Positive bowel sounds. No guarding or rebound appreciated.  NEUROLOGICAL: No focal neurological deficits appreciated. Cranial nerves are grossly intact.  EXTREMITIES: Dorsalis pedis pulses can be appreciated. No edema " appreciated.  SKIN:  Grossly intact, warm and dry.  Onychomycosis of nailbeds.      Scheduled Medications  Scheduled Medications[2]  PRN Medications  PRN Medications[3]  Continuous Medications  Continuous Medications[4]    Outpatient Medications:  Prior to Admission medications    Medication Sig Start Date End Date Taking? Authorizing Provider   acetaminophen (Tylenol) 325 mg tablet Take 2 tablets (650 mg) by mouth every 4 hours if needed for mild pain (1 - 3), fever (temp greater than 38.0 C) or headaches. 12/29/24   Reji Arciniega DO   calcitriol (Rocaltrol) 0.25 mcg capsule Take 1 capsule (0.25 mcg) by mouth once daily. 2/12/25 2/12/26  Andre Vasquez MD   calcitriol (Rocaltrol) 0.25 mcg capsule Take 1 capsule (0.25 mcg) by mouth once daily. 5/13/25 8/11/25  Guy Lynch MD   divalproex (Depakote) 500 mg EC tablet Take 2 tablets (1,000 mg) by mouth once daily at bedtime. Do not crush, chew, or split.  Patient not taking: Reported on 4/17/2025 12/29/24   Reji Arciniega DO   docusate sodium (Colace) 100 mg capsule Take 1 capsule (100 mg) by mouth 2 times a day as needed for constipation. 12/29/24   Reji Arciniega DO   fludrocortisone (Florinef) 0.1 mg tablet Take 1 tablet (0.1 mg) by mouth once daily. 4/17/25 7/16/25  John Lomas MD   fludrocortisone (Florinef) 0.1 mg tablet Take 1 tablet (0.1 mg) by mouth once daily. 5/13/25   Guy Lynch MD   hydrocortisone (Cortef) 10 mg tablet Take 1.5 tablets (15 mg) by mouth once daily in the morning. 2/12/25 3/14/25  Andre Vasquez MD   hydrocortisone (Cortef) 10 mg tablet Take 1.5 tablets (15 mg) by mouth once daily in the morning. 2/12/25 3/14/25  Andre Vasquez MD   hydrocortisone (Cortef) 10 mg tablet Take 1.5 tablets (15 mg) by mouth once daily in the morning AND 1 tablet (10 mg) once daily at 2pm as directed. 4/17/25 5/17/25  John Lomas MD   hydrocortisone (Cortef) 10 mg tablet Take 1 tablet (10 mg) by mouth once daily. Take 1 tablet every day at  2 pm. 5/13/25 6/12/25  Guy Lynch MD   hydrocortisone (Cortef) 5 mg tablet Take 1 tablet (5 mg) by mouth once daily in the evening. Take with meals. 2/12/25   Andre Vasquez MD   hydrocortisone (Cortef) 5 mg tablet Take 1 tablet (5 mg) by mouth once daily at bedtime. 4/17/25 5/17/25  John Lomas MD   LORazepam (Ativan) 0.5 mg tablet Take 1 tablet (0.5 mg) by mouth every 8 hours if needed for anxiety.  Patient not taking: Reported on 4/17/2025 1/1/25   Reji Arciniega DO   magnesium oxide (Mag-Ox) 400 mg (241.3 mg magnesium) tablet Take 1 tablet (400 mg) by mouth once daily. 2/12/25   Andre Vasquez MD   melatonin 5 mg tablet Take 1 tablet (5 mg) by mouth as needed at bedtime for sleep. 12/29/24   Reji rAciniega DO   omeprazole (PriLOSEC) 20 mg DR capsule Take 1 capsule (20 mg) by mouth once daily.    Historical Provider, MD   Oysco 500/D 500 mg-5 mcg (200 unit) tablet Take 1 tablet by mouth 2 times a day.    Historical Provider, MD   QUEtiapine (SEROquel) 25 mg tablet Take 1 tablet (25 mg) by mouth once daily at bedtime. 2/12/25   Andre Vasquez MD   risperiDONE (RisperDAL) 4 mg tablet Take 1 tablet (4 mg) by mouth 2 times a day.    Historical Provider, MD   Vitamin D3 125 mcg (5,000 unit) tablet Take 1 tablet (5,000 Units) by mouth once daily. 2/12/25   Andre Vasquez MD       LABS AND IMAGING:     Labs:  Results for orders placed or performed during the hospital encounter of 06/28/25 (from the past 24 hours)   CBC with Differential   Result Value Ref Range    WBC 9.8 4.4 - 11.3 x10*3/uL    nRBC 0.0 0.0 - 0.0 /100 WBCs    RBC 3.25 (L) 4.00 - 5.20 x10*6/uL    Hemoglobin 9.6 (L) 12.0 - 16.0 g/dL    Hematocrit 28.6 (L) 36.0 - 46.0 %    MCV 88 80 - 100 fL    MCH 29.5 26.0 - 34.0 pg    MCHC 33.6 32.0 - 36.0 g/dL    RDW 14.6 (H) 11.5 - 14.5 %    Platelets 198 150 - 450 x10*3/uL    Neutrophils % 68.4 40.0 - 80.0 %    Immature Granulocytes %, Automated 0.3 0.0 - 0.9 %    Lymphocytes % 21.6 13.0 - 44.0 %     Monocytes % 9.5 2.0 - 10.0 %    Eosinophils % 0.1 0.0 - 6.0 %    Basophils % 0.1 0.0 - 2.0 %    Neutrophils Absolute 6.70 1.20 - 7.70 x10*3/uL    Immature Granulocytes Absolute, Automated 0.03 0.00 - 0.70 x10*3/uL    Lymphocytes Absolute 2.12 1.20 - 4.80 x10*3/uL    Monocytes Absolute 0.93 0.10 - 1.00 x10*3/uL    Eosinophils Absolute 0.01 0.00 - 0.70 x10*3/uL    Basophils Absolute 0.01 0.00 - 0.10 x10*3/uL   Comprehensive Metabolic Panel   Result Value Ref Range    Glucose 96 74 - 99 mg/dL    Sodium 143 136 - 145 mmol/L    Potassium 3.1 (L) 3.5 - 5.3 mmol/L    Chloride 106 98 - 107 mmol/L    Bicarbonate 27 21 - 32 mmol/L    Anion Gap 13 10 - 20 mmol/L    Urea Nitrogen 19 6 - 23 mg/dL    Creatinine 0.53 0.50 - 1.05 mg/dL    eGFR >90 >60 mL/min/1.73m*2    Calcium 5.5 (LL) 8.6 - 10.3 mg/dL    Albumin 3.1 (L) 3.4 - 5.0 g/dL    Alkaline Phosphatase 54 33 - 110 U/L    Total Protein 5.1 (L) 6.4 - 8.2 g/dL    AST 13 9 - 39 U/L    Bilirubin, Total 0.3 0.0 - 1.2 mg/dL    ALT 17 7 - 45 U/L   Brain Natriuretic Peptide   Result Value Ref Range     (H) 0 - 99 pg/mL   Troponin I, High Sensitivity, Initial   Result Value Ref Range    Troponin I, High Sensitivity 3 0 - 13 ng/L   Troponin, High Sensitivity, 1 Hour   Result Value Ref Range    Troponin I, High Sensitivity 3 0 - 13 ng/L   D-dimer, quantitative   Result Value Ref Range    D-Dimer Non VTE, Quant (ng/mL FEU) 1,099 (H) <=500 ng/mL FEU   Troponin I, High Sensitivity   Result Value Ref Range    Troponin I, High Sensitivity 3 0 - 13 ng/L   Magnesium   Result Value Ref Range    Magnesium 1.28 (L) 1.60 - 2.40 mg/dL   Calcium, Ionized   Result Value Ref Range    POCT Calcium, Ionized     Urinalysis with Reflex Culture and Microscopic   Result Value Ref Range    Color, Urine Yellow Light-Yellow, Yellow, Dark-Yellow    Appearance, Urine Clear Clear    Specific Gravity, Urine 1.037 (N) 1.005 - 1.035    pH, Urine 7.0 5.0, 5.5, 6.0, 6.5, 7.0, 7.5, 8.0    Protein, Urine NEGATIVE  NEGATIVE, 10 (TRACE), 20 (TRACE) mg/dL    Glucose, Urine Normal Normal mg/dL    Blood, Urine NEGATIVE NEGATIVE mg/dL    Ketones, Urine NEGATIVE NEGATIVE mg/dL    Bilirubin, Urine NEGATIVE NEGATIVE mg/dL    Urobilinogen, Urine Normal Normal mg/dL    Nitrite, Urine NEGATIVE NEGATIVE    Leukocyte Esterase, Urine 250 Kevon/uL (A) NEGATIVE   Microscopic Only, Urine   Result Value Ref Range    WBC, Urine 11-20 (A) 1-5, NONE /HPF    RBC, Urine NONE NONE, 1-2, 3-5 /HPF    Squamous Epithelial Cells, Urine 1-9 (SPARSE) Reference range not established. /HPF    Mucus, Urine 4+ Reference range not established. /LPF        Imaging:  CT angio chest for pulmonary embolism  Narrative: STUDY:  CT Angiogram of the Chest; 6/28/2025 8:16  INDICATION:  Shortness of breath, elevated D-dimer.  COMPARISON:  2/22/2025 XR Chest, 6/14/2023 CTA Chest.  ACCESSION NUMBER(S):  [id number]  ORDERING CLINICIAN:  ENMANUEL RIVAS  TECHNIQUE:  CTA of the chest was performed with intravenous contrast.   Images are reviewed and processed at a workstation according to the CT  angiogram protocol with 3-D and/or MIP post processing imaging  generated.  Omnipaque 350 75 mL was administered intravenously.    Automated mA/kV exposure control was utilized and patient examination  was performed in strict accordance with principles of ALARA.  FINDINGS:  Pulmonary arteries are adequately opacified without acute or chronic  filling defects.  The thoracic aorta is normal in course and caliber  without dissection or aneurysm.  The heart is normal in size without pericardial effusion.  Thoracic  lymph nodes are not enlarged.  There is no pleural effusion, pleural thickening, or pneumothorax.   The airways are patent.  Biapical pleural-parenchymal scarring.  Mild mosaic perfusion.  Upper abdomen demonstrates no acute pathology.  There are no acute fractures.  No suspicious bony lesions.  Impression: 1.No pulmonary embolism or other acute intrathoracic  process.  2.Mild mosaic perfusion suggestive of air trapping or small  airways disease.  Signed by Lai Ordaz MD  XR chest 1 view  Narrative: Interpreted By:  Finkelstein, Evan,   STUDY:  XR CHEST 1 VIEW;  6/28/2025 5:26 am      INDICATION:  Signs/Symptoms:Chest Pain.          COMPARISON:  None.      ACCESSION NUMBER(S):  [id number]      ORDERING CLINICIAN:  LOLA BARTLETT      FINDINGS:          CARDIOMEDIASTINAL SILHOUETTE:  Cardiomediastinal silhouette is normal in size and configuration.      LUNGS:  No pulmonary consolidation, pleural effusion or pneumothorax.      ABDOMEN:  No remarkable upper abdominal findings.      BONES:  No acute osseous abnormality.      Impression: No radiographic evidence of acute cardiopulmonary pathology.      MACRO:  None.      Signed by: Evan Finkelstein 6/28/2025 5:28 AM  Dictation workstation:   [id number]             [1] No family history on file.  [2] [START ON 6/29/2025] calcitriol, 0.25 mcg, oral, Daily  [START ON 6/29/2025] calcitriol, 0.25 mcg, oral, Daily  [START ON 6/29/2025] cholecalciferol, 125 mcg, oral, Daily  [START ON 6/29/2025] fludrocortisone, 0.1 mg, oral, Daily  hydrocortisone, 15 mg, oral, BID  [START ON 6/29/2025] pantoprazole, 40 mg, oral, Daily before breakfast  QUEtiapine, 25 mg, oral, Nightly  risperiDONE, 0.5 mg, oral, BID  [3] [4]

## 2025-06-28 NOTE — CARE PLAN
SW spoke to [facility] ([phone]) and confirmed that pt is from their facility and since pt is being admitted a new referral will need to be submitted when pt is stable.  SW will follow.

## 2025-06-28 NOTE — ED PROVIDER NOTES
HPI   Chief Complaint   Patient presents with    Tingling    Chest Pain    Shortness of Breath       Patient comes by ambulance from [facility] across the street complaining of tingling with difficulty breathing and chest tightness.  Her main medical problem is her [place]'s disease.  She is on the correct medications with Florinef, calcitriol, and prednisone.  She just on the 25th was admitted to [facility] in her problem started yesterday she said.  She has not missed any medication.  Tonight she says she is tingling all over and her chest is tight difficult to breathe.            Patient History   Medical History[1]  Surgical History[2]  Family History[3]  Social History[4]    Physical Exam   ED Triage Vitals [06/28/25 0415]   Temperature Heart Rate Respirations BP   36.7 °C (98 °F) 66 18 100/67      Pulse Ox Temp src Heart Rate Source Patient Position   100 % -- -- --      BP Location FiO2 (%)     -- --       Physical Exam  Vitals and nursing note reviewed.   Constitutional:       Appearance: She is ill-appearing.      Comments: Very thin woman   HENT:      Head: Normocephalic and atraumatic.      Right Ear: Ear canal normal.      Left Ear: Ear canal normal.      Nose: Nose normal.      Mouth/Throat:      Mouth: Mucous membranes are moist.   Cardiovascular:      Rate and Rhythm: Normal rate.   Pulmonary:      Effort: Pulmonary effort is normal.      Breath sounds: Normal breath sounds.   Abdominal:      General: Abdomen is flat.      Palpations: Abdomen is soft.   Musculoskeletal:         General: Normal range of motion.      Cervical back: Normal range of motion.   Skin:     General: Skin is warm and dry.   Neurological:      General: No focal deficit present.      Mental Status: She is alert.           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  She takes Florinef calcitriol and prednisone.  She has been at [facility] since 6/25.  Symptoms began 6/26.  She is tingling all over her chest is tight and she is having trouble breathing.  Blood pressure 90/49.  Calcium 5.5.  Potassium 3.1.  She received Solu-Cortef 100 mg IV, calcium chloride 1 g IV, and a 1 L saline bolus. [BT]   0727 POTASSIUM(!): 3.1  Noted.  Replace with oral and IV potassium. [BT]   0734 ECG 12 lead  EKG interpreted by me performed at 518 on 6/20/2025 shows sinus rhythm with rate of 63.  Normal axis.  T wave flattening in V4 through V6 with T wave inversions in 1 and aVL.  QT prolonged QTc of 573.  No acute injury pattern. [BT]   0737 D-Dimer Non VTE, Quant (ng/mL FEU)(!): 1,099  D-dimer 1099.  Will check CT chest PE study to evaluate for PE. [BT]   0807 I ordered home meds per med rec at [facility].  She was placed on Florinef, calcitriol, vitamin D, iron, hydrocortisone, magnesium, risperidone (substitution for paliperidone ER), and potassium. [BT]   0808 Patient was unwilling to have CT chest PE study performed.  She does not currently understand the consequences of refusing this test.  I filled out an ED capacity statement.  I had ordered Valium 5 mg IV for anxiety.  Shortly after speaking with patient she agreed to have CT chest performed. [BT]   0809 ECG 12 lead  Repeat EKG interpreted by me performed at 7:45 AM on 6/28/2025 shows sinus rhythm with rate of 62.  Flat T wave in lead V3, V4, V5.  Normal axis.  T wave inversions in V6, 1, aVL.  QT prolonged with QTc 539.  No acute injury pattern. [BT]   0833 MAGNESIUM(!): 1.28  Replaced with IV Magnesium [BT]   1022 I spoke with medicine hospitalist Dr. Martinez at [facility] who accepted patient for transfer.  Transferred to [facility]. [BT]   1023 CT angio chest for pulmonary embolism  CT chest PE study negative for PE.  Air trapping or small airways disease noted. [BT]   1024 Troponin I  Series, High Sensitivity (0, 1 HR)  Troponin negative x 2.  EKG without acute injury pattern.  Doubt ACS. [BT]   1024 I spoke with MICU staff Dr. Mccray at [facility].  No critical care needs at this time.  He advised admission to [facility] telemetry floor.  I spoke with medicine hospitalist Michelle Delgado, NP here at [facility].  Her concern is given the prolonged QT should patient deteriorate she will require transfer.  She advised hospitalization at [facility] under medicine in case she deteriorates requiring ICU level care. [BT]      ED Course User Index  [BT] Ciro Desouza, DO         Diagnoses as of 06/29/25 1438   Hypocalcemia   Hypokalemia   Phelps disease (Multi)   SOB (shortness of breath)   Paresthesia   Prolonged Q-T interval on ECG   Elevated d-dimer   Addisonian crisis (Multi)                 No data recorded                                 Medical Decision Making  [facility] with tingling diffusely over her body and a blood pressure with systolic in the low 90s.  She said she hurt all over and was having some difficulty breathing and her chest felt tight.  Denied any vomiting or diarrhea.  No urinary symptoms.  I looked at her chart quickly and saw that she was an addisonian patient so administered not only a bolus of saline but also 100 mg of Solu-Cortef IV, reviewed cultures and ordered an infectious source workup.  Before I signed the patient out to Dr. Desouza, my oncoming relief, I did look at her EKG and saw that she did have a prolonged QT in keeping with her calcium level of 5.5.  This was read by me.  The rhythm was normal sinus with a rate of 65.  Axes were normal.        Procedure  Procedures         [1]   Past Medical History:  Diagnosis Date    Phelps disease (Multi)    [2] History reviewed. No pertinent surgical history.  [3] No family history on file.  [4]   Social History  Tobacco Use    Smoking status: Former     Types: Cigarettes    Smokeless  tobacco: Not on file    Tobacco comments:     Smoked as a teenager   Vaping Use    Vaping status: Unknown   Substance Use Topics    Alcohol use: Not Currently    Drug use: Not Currently        Sanjay Diaz MD  06/29/25 7667

## 2025-06-28 NOTE — ED NOTES
Patient request to eat, sandwich and ginger ale given to patient. Patient consumed all     Torrie Hines RN  06/28/25 0582

## 2025-06-28 NOTE — ED PROVIDER NOTES
Emergency Medicine Transition of Care Note.    I received Kriss Malloy in signout from Dr. Diaz.  Please see the previous ED provider note for all HPI, PE and MDM up to the time of signout at 07:00. This is in addition to the primary record.    In brief Kriss Malloy is an 49 y.o. 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  She takes Florinef calcitriol and prednisone.  She has been at [facility] since 6/25.  Symptoms began 6/26.  She is tingling all over her chest is tight and she is having trouble breathing.  Blood pressure 90/49.  Calcium 5.5.  Potassium 3.1.  She received Solu-Cortef 100 mg IV, calcium chloride 1 g IV, and a 1 L saline bolus. [BT]   0727 POTASSIUM(!): 3.1  Noted.  Replace with oral and IV potassium. [BT]   0734 ECG 12 lead  EKG interpreted by me performed at 518 on 6/20/2025 shows sinus rhythm with rate of 63.  Normal axis.  T wave flattening in V4 through V6 with T wave inversions in 1 and aVL.  QT prolonged QTc of 573.  No acute injury pattern. [BT]   0737 D-Dimer Non VTE, Quant (ng/mL FEU)(!): 1,099  D-dimer 1099.  Will check CT chest PE study to evaluate for PE. [BT]   0807 I ordered home meds per med rec at [facility].  She was placed on Florinef, calcitriol, vitamin D, iron, hydrocortisone, magnesium, risperidone (substitution for paliperidone ER), and potassium. [BT]   0808 Patient was unwilling to have CT chest PE study performed.  She does not currently understand the consequences of refusing this test.  I filled out an ED capacity statement.  I had ordered Valium 5 mg IV for anxiety.  Shortly after  speaking with patient she agreed to have CT chest performed. [BT]   0809 ECG 12 lead  Repeat EKG interpreted by me performed at 7:45 AM on 6/28/2025 shows sinus rhythm with rate of 62.  Flat T wave in lead V3, V4, V5.  Normal axis.  T wave inversions in V6, 1, aVL.  QT prolonged with QTc 539.  No acute injury pattern. [BT]   0833 MAGNESIUM(!): 1.28  Replaced with IV Magnesium [BT]   1022 I spoke with medicine hospitalist Dr. Martinez at [facility] who accepted patient for transfer.  Transferred to [facility]. [BT]   1023 CT angio chest for pulmonary embolism  CT chest PE study negative for PE.  Air trapping or small airways disease noted. [BT]   1024 Troponin I Series, High Sensitivity (0, 1 HR)  Troponin negative x 2.  EKG without acute injury pattern.  Doubt ACS. [BT]   1024 I spoke with MICU staff Dr. Mccray at [facility].  No critical care needs at this time.  He advised admission to [facility] telemetry floor.  I spoke with medicine hospitalist Michelle Delgado NP here at [place].  Her concern is given the prolonged QT should patient deteriorate she will require transfer.  She advised hospitalization at [facility] under medicine in case she deteriorates requiring ICU level care. [BT]      ED Course User Index  [BT] Ciro Desouza, DO         Diagnoses as of 06/28/25 1026   Hypocalcemia   Hypokalemia   Melchor disease (Multi)   SOB (shortness of breath)   Paresthesia   Prolonged Q-T interval on ECG   Elevated d-dimer   Addisonian crisis (Multi)     Medical Decision Making      Final diagnoses:   [E83.51] Hypocalcemia   [E87.6] Hypokalemia   [E27.1] Stanley disease (Multi)   [R06.02] SOB (shortness of breath)   [R20.2] Paresthesia   [R94.31] Prolonged Q-T interval on ECG   [R79.89] Elevated d-dimer   [E27.2] Addisonian crisis (Multi)     CT angio chest for pulmonary embolism   Final Result   1.No pulmonary embolism or other acute intrathoracic process.   2.Mild mosaic perfusion suggestive of air trapping or small   airways  disease.   Signed by Lai Ordaz MD      XR chest 1 view   Final Result   No radiographic evidence of acute cardiopulmonary pathology.        MACRO:   None.        Signed by: Evan Finkelstein 6/28/2025 5:28 AM   Dictation workstation:   [id number]                Procedure  Critical Care    Performed by: Ciro Desouza DO  Authorized by: Ciro Desouza DO    Critical care provider statement:     Critical care time (minutes):  40    Critical care time was exclusive of:  Separately billable procedures and treating other patients and teaching time    Critical care was necessary to treat or prevent imminent or life-threatening deterioration of the following conditions:  Endocrine crisis and metabolic crisis    Critical care was time spent personally by me on the following activities:  Blood draw for specimens, development of treatment plan with patient or surrogate, obtaining history from patient or surrogate, examination of patient, evaluation of patient's response to treatment, pulse oximetry, re-evaluation of patient's condition, ordering and performing treatments and interventions, review of old charts, discussions with consultants, ordering and review of laboratory studies and ordering and review of radiographic studies    Care discussed with: accepting provider at another facility        DO Ciro Purdy DO  06/28/25 1027

## 2025-06-28 NOTE — PROGRESS NOTES
06/28/25 1453   Discharge Planning   Living Arrangements Other (Comment)  (GALEN confirming patient from [facility] facility)   Support Systems Family members   Assistance Needed A&OX2-3 (alert and oriented to person,place,situation -year stated '2025'then'2026; room air baseline and currently room air-denies CPAP;drives but no car; no PCP;   Type of Residence Inpatient behavioral health unit/facility   Do you have animals or pets at home? No   Who is requesting discharge planning? Provider   Expected Discharge Disposition Psych  (Patient stated from [facility]. GALEN aware and following up with [facility] to confirm)   Does the patient need discharge transport arranged? Yes   RoundTrip coordination needed? Yes   Has discharge transport been arranged? No

## 2025-06-28 NOTE — PROGRESS NOTES
"Capacity Assessment Tool    \"Capacity\" is the \"ability\" to make a decision.  The decision in question must be specific (one decision), relevant to a patient's current condition (appropriate), and timely (neither prospective nor retrospective).    Capacity varies based on knowledge base (explanation/understanding of clinical information), cognitive processing, acute psychiatric illness, and other clinical conditions.    In order to be deemed \"capacitated\" to make a single decision at one point in time, a patient must demonstrate all 4 of the following elements:    *Ability to consistently communicate a choice (consistent over time with adequate information)  *Ability to understand the relevant information (accurate knowledge of condition)  *Ability to appreciate the situation and its consequences (risks/benefits, pros/cons)  *Ability to reason about treatment options (without undue influence of a person or condition, eg. suicidality or acute psychosis)      Current Decision    Clinical issue:   Elevated D-dimer, chest pain, shortness of breath.  Patient refusing CT PE.  I explained to her that a pulmonary embolism can be fatal and we need to get a CT scan of her chest to diagnose pulmonary embolism.  She is refusing the test.  She states \"It is all my Meriwether's disease.\"  I was unable to successfully reason with patient.  She is not willing to proceed with CT PE or other testing.  At this time, she lacks decision making capacity.  She does not comprehend the potentially fatal consequences of undiagnosed pulmonary emboli.      Did the appropriate team address relevant information with the patient:  Yes    Date: 6/28/2025    If \"NO\" is selected for appropriate team, then please discuss with the appropriate team.  The appropriate team should be encouraged to address relevant information with the patient AND reevaluate capacity when appropriate.    Capacity Evaluation    Patient demonstrates ability to consistently " "communicate choice:  Yes  refusing CTPE and further testing    Patient demonstrates ability to understand the relevant information:  No she does not understand the consequences of refusing imaging and further testing, which could be fatal.    Patient demonstrates ability to appreciate the situation and its consequences:  No    Patient demonstrates ability to reason about treatment options:  No She is unreasonable and unwilling to cooperate with further testing including CT of chest.    If ANY of the above items are answered \"NO,\" the patient LACKS CAPACITY for that specific decision at hand, at that specific time.  Further capacity evaluations can be done as needed.         "

## 2025-06-28 NOTE — ED NOTES
Patient to ED last night for evaluation of hypotension. Bp remains low, IVF bolus given and iv calcium initiated as well, labs pending at this time, breakfast to bedside, patient defers at this time, patient did eat earlier this am     Torrie Hines RN  06/28/25 3662

## 2025-06-29 ENCOUNTER — APPOINTMENT (OUTPATIENT)
Dept: CARDIOLOGY | Facility: HOSPITAL | Age: 50
End: 2025-06-29
Payer: COMMERCIAL

## 2025-06-29 VITALS
HEART RATE: 52 BPM | OXYGEN SATURATION: 100 % | HEIGHT: 67 IN | RESPIRATION RATE: 18 BRPM | WEIGHT: 121.69 LBS | DIASTOLIC BLOOD PRESSURE: 81 MMHG | SYSTOLIC BLOOD PRESSURE: 137 MMHG | TEMPERATURE: 98.1 F | BODY MASS INDEX: 19.1 KG/M2

## 2025-06-29 PROBLEM — F20.3 UNDIFFERENTIATED SCHIZOPHRENIA (MULTI): Chronic | Status: ACTIVE | Noted: 2025-06-29

## 2025-06-29 LAB
ANION GAP SERPL CALC-SCNC: 13 MMOL/L (ref 10–20)
ANION GAP SERPL CALC-SCNC: 17 MMOL/L (ref 10–20)
BACTERIA UR CULT: NORMAL
BUN SERPL-MCNC: 11 MG/DL (ref 6–23)
BUN SERPL-MCNC: 11 MG/DL (ref 6–23)
CA-I BLD-SCNC: 0.74 MMOL/L (ref 1.1–1.33)
CALCIUM SERPL-MCNC: 5.5 MG/DL (ref 8.6–10.3)
CALCIUM SERPL-MCNC: 6.1 MG/DL (ref 8.6–10.3)
CHLORIDE SERPL-SCNC: 104 MMOL/L (ref 98–107)
CHLORIDE SERPL-SCNC: 105 MMOL/L (ref 98–107)
CO2 SERPL-SCNC: 25 MMOL/L (ref 21–32)
CO2 SERPL-SCNC: 25 MMOL/L (ref 21–32)
CREAT SERPL-MCNC: 0.55 MG/DL (ref 0.5–1.05)
CREAT SERPL-MCNC: 0.65 MG/DL (ref 0.5–1.05)
EGFRCR SERPLBLD CKD-EPI 2021: >90 ML/MIN/1.73M*2
EGFRCR SERPLBLD CKD-EPI 2021: >90 ML/MIN/1.73M*2
ERYTHROCYTE [DISTWIDTH] IN BLOOD BY AUTOMATED COUNT: 15 % (ref 11.5–14.5)
GLUCOSE SERPL-MCNC: 123 MG/DL (ref 74–99)
GLUCOSE SERPL-MCNC: 126 MG/DL (ref 74–99)
HCT VFR BLD AUTO: 28.7 % (ref 36–46)
HGB BLD-MCNC: 9.2 G/DL (ref 12–16)
MAGNESIUM SERPL-MCNC: 1.52 MG/DL (ref 1.6–2.4)
MAGNESIUM SERPL-MCNC: 1.61 MG/DL (ref 1.6–2.4)
MCH RBC QN AUTO: 28.6 PG (ref 26–34)
MCHC RBC AUTO-ENTMCNC: 32.1 G/DL (ref 32–36)
MCV RBC AUTO: 89 FL (ref 80–100)
NRBC BLD-RTO: 0 /100 WBCS (ref 0–0)
PLATELET # BLD AUTO: 169 X10*3/UL (ref 150–450)
POTASSIUM SERPL-SCNC: 3.7 MMOL/L (ref 3.5–5.3)
POTASSIUM SERPL-SCNC: 4.1 MMOL/L (ref 3.5–5.3)
RBC # BLD AUTO: 3.22 X10*6/UL (ref 4–5.2)
SODIUM SERPL-SCNC: 139 MMOL/L (ref 136–145)
SODIUM SERPL-SCNC: 142 MMOL/L (ref 136–145)
WBC # BLD AUTO: 9.8 X10*3/UL (ref 4.4–11.3)

## 2025-06-29 PROCEDURE — 83735 ASSAY OF MAGNESIUM: CPT | Performed by: HOSPITALIST

## 2025-06-29 PROCEDURE — 2500000002 HC RX 250 W HCPCS SELF ADMINISTERED DRUGS (ALT 637 FOR MEDICARE OP, ALT 636 FOR OP/ED): Performed by: STUDENT IN AN ORGANIZED HEALTH CARE EDUCATION/TRAINING PROGRAM

## 2025-06-29 PROCEDURE — 93005 ELECTROCARDIOGRAM TRACING: CPT

## 2025-06-29 PROCEDURE — 85027 COMPLETE CBC AUTOMATED: CPT | Performed by: HOSPITALIST

## 2025-06-29 PROCEDURE — 36415 COLL VENOUS BLD VENIPUNCTURE: CPT | Performed by: STUDENT IN AN ORGANIZED HEALTH CARE EDUCATION/TRAINING PROGRAM

## 2025-06-29 PROCEDURE — 99254 IP/OBS CNSLTJ NEW/EST MOD 60: CPT | Performed by: INTERNAL MEDICINE

## 2025-06-29 PROCEDURE — 2500000001 HC RX 250 WO HCPCS SELF ADMINISTERED DRUGS (ALT 637 FOR MEDICARE OP): Performed by: STUDENT IN AN ORGANIZED HEALTH CARE EDUCATION/TRAINING PROGRAM

## 2025-06-29 PROCEDURE — 2500000001 HC RX 250 WO HCPCS SELF ADMINISTERED DRUGS (ALT 637 FOR MEDICARE OP): Performed by: INTERNAL MEDICINE

## 2025-06-29 PROCEDURE — 80048 BASIC METABOLIC PNL TOTAL CA: CPT | Performed by: STUDENT IN AN ORGANIZED HEALTH CARE EDUCATION/TRAINING PROGRAM

## 2025-06-29 PROCEDURE — 2500000002 HC RX 250 W HCPCS SELF ADMINISTERED DRUGS (ALT 637 FOR MEDICARE OP, ALT 636 FOR OP/ED): Performed by: HOSPITALIST

## 2025-06-29 PROCEDURE — 36415 COLL VENOUS BLD VENIPUNCTURE: CPT | Performed by: HOSPITALIST

## 2025-06-29 PROCEDURE — 2500000002 HC RX 250 W HCPCS SELF ADMINISTERED DRUGS (ALT 637 FOR MEDICARE OP, ALT 636 FOR OP/ED): Performed by: NURSE PRACTITIONER

## 2025-06-29 PROCEDURE — 99233 SBSQ HOSP IP/OBS HIGH 50: CPT | Performed by: STUDENT IN AN ORGANIZED HEALTH CARE EDUCATION/TRAINING PROGRAM

## 2025-06-29 PROCEDURE — 82330 ASSAY OF CALCIUM: CPT | Performed by: STUDENT IN AN ORGANIZED HEALTH CARE EDUCATION/TRAINING PROGRAM

## 2025-06-29 PROCEDURE — 1200000002 HC GENERAL ROOM WITH TELEMETRY DAILY

## 2025-06-29 PROCEDURE — 80048 BASIC METABOLIC PNL TOTAL CA: CPT | Performed by: HOSPITALIST

## 2025-06-29 PROCEDURE — 2500000004 HC RX 250 GENERAL PHARMACY W/ HCPCS (ALT 636 FOR OP/ED): Performed by: STUDENT IN AN ORGANIZED HEALTH CARE EDUCATION/TRAINING PROGRAM

## 2025-06-29 PROCEDURE — 83735 ASSAY OF MAGNESIUM: CPT | Performed by: STUDENT IN AN ORGANIZED HEALTH CARE EDUCATION/TRAINING PROGRAM

## 2025-06-29 PROCEDURE — 2500000004 HC RX 250 GENERAL PHARMACY W/ HCPCS (ALT 636 FOR OP/ED): Performed by: NURSE PRACTITIONER

## 2025-06-29 PROCEDURE — 2500000001 HC RX 250 WO HCPCS SELF ADMINISTERED DRUGS (ALT 637 FOR MEDICARE OP): Performed by: HOSPITALIST

## 2025-06-29 PROCEDURE — 2500000004 HC RX 250 GENERAL PHARMACY W/ HCPCS (ALT 636 FOR OP/ED): Mod: JZ | Performed by: HOSPITALIST

## 2025-06-29 PROCEDURE — 99253 IP/OBS CNSLTJ NEW/EST LOW 45: CPT | Performed by: PSYCHIATRY & NEUROLOGY

## 2025-06-29 RX ORDER — POTASSIUM CHLORIDE 20 MEQ/1
40 TABLET, EXTENDED RELEASE ORAL ONCE
Status: COMPLETED | OUTPATIENT
Start: 2025-06-29 | End: 2025-06-29

## 2025-06-29 RX ORDER — HYDROCORTISONE 10 MG/1
20 TABLET ORAL 3 TIMES DAILY
Status: DISCONTINUED | OUTPATIENT
Start: 2025-06-29 | End: 2025-07-02

## 2025-06-29 RX ORDER — POTASSIUM CHLORIDE 750 MG/1
10 CAPSULE, EXTENDED RELEASE ORAL 2 TIMES DAILY
COMMUNITY

## 2025-06-29 RX ORDER — CALCIUM GLUCONATE 20 MG/ML
1 INJECTION, SOLUTION INTRAVENOUS EVERY 4 HOURS
Status: COMPLETED | OUTPATIENT
Start: 2025-06-29 | End: 2025-06-29

## 2025-06-29 RX ORDER — CALCITRIOL 0.25 UG/1
0.25 CAPSULE ORAL 2 TIMES DAILY
Status: DISCONTINUED | OUTPATIENT
Start: 2025-06-29 | End: 2025-07-01

## 2025-06-29 RX ORDER — FERROUS SULFATE 325(65) MG
1 TABLET ORAL
Status: DISCONTINUED | OUTPATIENT
Start: 2025-06-29 | End: 2025-07-03 | Stop reason: HOSPADM

## 2025-06-29 RX ORDER — FERROUS SULFATE 325(65) MG
325 TABLET ORAL
COMMUNITY

## 2025-06-29 RX ORDER — MAGNESIUM SULFATE HEPTAHYDRATE 40 MG/ML
2 INJECTION, SOLUTION INTRAVENOUS ONCE
Status: COMPLETED | OUTPATIENT
Start: 2025-06-29 | End: 2025-06-29

## 2025-06-29 RX ORDER — RISPERIDONE 1 MG/1
1 TABLET ORAL 2 TIMES DAILY
Status: DISCONTINUED | OUTPATIENT
Start: 2025-06-29 | End: 2025-07-03 | Stop reason: HOSPADM

## 2025-06-29 RX ORDER — CALCIUM GLUCONATE 20 MG/ML
1 INJECTION, SOLUTION INTRAVENOUS EVERY 4 HOURS
Status: COMPLETED | OUTPATIENT
Start: 2025-06-29 | End: 2025-06-30

## 2025-06-29 RX ADMIN — HYDROCORTISONE 20 MG: 10 TABLET ORAL at 14:55

## 2025-06-29 RX ADMIN — Medication 125 MCG: at 08:21

## 2025-06-29 RX ADMIN — POTASSIUM CHLORIDE 40 MEQ: 1500 TABLET, EXTENDED RELEASE ORAL at 06:19

## 2025-06-29 RX ADMIN — CALCIUM GLUCONATE 1 G: 20 INJECTION, SOLUTION INTRAVENOUS at 08:20

## 2025-06-29 RX ADMIN — CALCIUM GLUCONATE 1 G: 20 INJECTION, SOLUTION INTRAVENOUS at 21:49

## 2025-06-29 RX ADMIN — HYDROCORTISONE SODIUM SUCCINATE 25 MG: 100 INJECTION, POWDER, FOR SOLUTION INTRAMUSCULAR; INTRAVENOUS at 06:20

## 2025-06-29 RX ADMIN — RISPERIDONE 1 MG: 1 TABLET, FILM COATED ORAL at 20:37

## 2025-06-29 RX ADMIN — CALCITRIOL CAPSULES 0.25 MCG 0.25 MCG: 0.25 CAPSULE ORAL at 08:21

## 2025-06-29 RX ADMIN — SODIUM CHLORIDE AND POTASSIUM CHLORIDE 75 ML/HR: 9; 1.49 INJECTION, SOLUTION INTRAVENOUS at 16:48

## 2025-06-29 RX ADMIN — HYDROCORTISONE 20 MG: 10 TABLET ORAL at 20:37

## 2025-06-29 RX ADMIN — CALCIUM GLUCONATE 1 G: 20 INJECTION, SOLUTION INTRAVENOUS at 10:31

## 2025-06-29 RX ADMIN — HYDROCORTISONE 20 MG: 10 TABLET ORAL at 10:33

## 2025-06-29 RX ADMIN — SODIUM CHLORIDE AND POTASSIUM CHLORIDE 75 ML/HR: 9; 1.49 INJECTION, SOLUTION INTRAVENOUS at 03:47

## 2025-06-29 RX ADMIN — MAGNESIUM SULFATE HEPTAHYDRATE 2 G: 2 INJECTION, SOLUTION INTRAVENOUS at 06:20

## 2025-06-29 RX ADMIN — CALCIUM GLUCONATE 1 G: 20 INJECTION, SOLUTION INTRAVENOUS at 18:49

## 2025-06-29 RX ADMIN — FLUDROCORTISONE ACETATE 0.1 MG: 0.1 TABLET ORAL at 08:21

## 2025-06-29 RX ADMIN — MAGNESIUM SULFATE HEPTAHYDRATE 2 G: 2 INJECTION, SOLUTION INTRAVENOUS at 18:14

## 2025-06-29 RX ADMIN — CALCITRIOL CAPSULES 0.25 MCG 0.25 MCG: 0.25 CAPSULE ORAL at 20:37

## 2025-06-29 RX ADMIN — FERROUS SULFATE TAB 325 MG (65 MG ELEMENTAL FE) 1 TABLET: 325 (65 FE) TAB at 08:21

## 2025-06-29 RX ADMIN — RISPERIDONE 0.5 MG: 0.5 TABLET, FILM COATED ORAL at 08:21

## 2025-06-29 ASSESSMENT — ENCOUNTER SYMPTOMS
DIARRHEA: 0
DIFFICULTY URINATING: 0
FREQUENCY: 0
SHORTNESS OF BREATH: 0
FATIGUE: 1
VOMITING: 0
CHEST TIGHTNESS: 1
ABDOMINAL PAIN: 0
UNEXPECTED WEIGHT CHANGE: 0
NAUSEA: 0
CONSTIPATION: 0
WEAKNESS: 1
ENDOCRINE COMMENTS: AS ABOVE

## 2025-06-29 ASSESSMENT — PAIN - FUNCTIONAL ASSESSMENT
PAIN_FUNCTIONAL_ASSESSMENT: 0-10
PAIN_FUNCTIONAL_ASSESSMENT: 0-10

## 2025-06-29 ASSESSMENT — PAIN SCALES - GENERAL
PAINLEVEL_OUTOF10: 0 - NO PAIN
PAINLEVEL_OUTOF10: 0 - NO PAIN

## 2025-06-29 NOTE — CARE PLAN
The patient's goals for the shift include  vic    The clinical goals for the shift include Patient will have a mag of 2 by end of shift    Problem: Pain - Adult  Goal: Verbalizes/displays adequate comfort level or baseline comfort level  6/29/2025 1203 by Beatriz Woods RN  Outcome: Progressing  6/29/2025 1203 by Beatriz Woods RN  Outcome: Progressing     Problem: Discharge Planning  Goal: Discharge to home or other facility with appropriate resources  6/29/2025 1203 by Beatriz Woods RN  Outcome: Progressing  6/29/2025 1203 by Beatriz Woods RN  Outcome: Progressing     Problem: Safety - Adult  Goal: Free from fall injury  6/29/2025 1203 by Beatriz Woods RN  Outcome: Progressing  6/29/2025 1203 by Beatriz Woods RN  Outcome: Progressing

## 2025-06-29 NOTE — PROGRESS NOTES
Occupational Therapy                 Therapy Communication Note    Patient Name: Kriss Malloy  MRN: [id number]  Department: [address]  Room: [address] [place] [address]  Today's Date: 6/29/2025     Discipline: Occupational Therapy    Missed Visit: OT Missed Visit: Yes     Missed Visit Reason: Missed Visit Reason: Cancel (Colleborated with nursing staff and pt is walking hallways without any assistive devices and is able to complete ADLs independently so no OT services are needed at this time.)    Missed Time: Cancel

## 2025-06-29 NOTE — CONSULTS
"Inpatient consult to Psychiatry  Consult performed by: Maria D Ybarra MD  Consult ordered by: Diego Raines MD  Reason for consult: qtc question, pressured speech, electrolyte abnormalities        Referring Provider  Internal medicine    History Of Present Illness  Kriss Malloy is a 49 y.o. female presenting with Melchor disease, hypocalcemia, prolonged qtc. Consult was requested for assessment for rapid speech. Patient was seen and examined.  Patient stated she has not mental health history, patient was muttering under her breath, noted eps in both hands. I introduced myself stating I was asked to her see her regarding her meds and electrolyte effects on EKG. Patient stated, \"I dont have schizophrenia, my parents have schizophrenia\". I had not asked her if she had schizophrenia.  She was guarded, with pressured speech. She gave psychosocial history, see below. She denied AH, VH, delusions could not be elicited as she gave select questions answers, stated \"I dont like to repeat myself\", irritable. Did take her risperidone 0.5mg this am for her nurse.    Medications on op reviewed history: Fluphenazine, haldol, invega, risperidone now on Seroquel, cogentin in 2024.      Past Medical History  She has a past medical history of Brunswick disease (Multi).  Long history of antipsychotic agents, prior psychiatric admission she stated years ago  Surgical History  She has no past surgical history on file.     Social History  She reports that she has quit smoking. Her smoking use included cigarettes. She does not have any smokeless tobacco history on file. She reports that she does not currently use alcohol. She reports that she does not currently use drugs. Living with sister in [place] as she is chronically homeless, per her, not employed     Allergies  Patient has no known allergies.    Review of Systems    Psychiatric ROS - Adult  Anxiety: Negative  Depression: negative  Delirium: negative  Psychosis: delusions guarded " "rule out paranoia  Sherrie: talking fast  Safety Issues: taking risperidone in the hospital  Psychiatric ROS Comment:na     Physical Exam     Mental Status Exam  General: female with menta health, addisons disease  Appearance: frail, in gown, laying in bed  Attitude: guarded, answers questions with \" I am not mentally ill\"  Behavior: direct eye contact  Motor Activity: eps in upper arms, suspect medication induced, history of antipsychotics  Speech: rapid, mumbled at times, unimpaired  Mood: frustrated  Affect: irritable, mild  Thought Process: grossly organized  Thought Content: Denied suicidal ideation, denied homicidal ideation, no delusions were elicited, reported or noted during this assessment period   Thought Perception: Denied auditory, visual, somatic, tactile and olfactory hallucinatory experiences    Cognition: Alert and oriented to person, place, time, purpose, short and long term memory are grossly intact, language is grossly intact  Insight: limited to mental health, is taking her medical meds  Judgement: limited relating to her mental caty, taking medical meds    Psychiatric Risk Assessment  Violence Risk Assessment: lower socioeconomic class, major mental illness, and unemployment  Acute Risk of Harm to Others is Considered: low   Suicide Risk Assessment: current psychiatric illness and unmarried  Protective Factors against Suicide: adherence to  treatment and sense of responsibility toward family  Acute Risk of Harm to Self is Considered: low    Last Recorded Vitals  Blood pressure 125/81, pulse 75, temperature 36.1 °C (96.9 °F), temperature source Temporal, resp. rate 21, height 1.689 m (5' 6.5\"), weight 55.2 kg (121 lb 11.1 oz), SpO2 100%.    Relevant Results             Assessment/Plan   Assessment & Plan  Hypocalcemia    Undifferentiated schizophrenia (Multi)  On chart review  Dc seroquel due to qtc  Did take risperidone so far  Pharmacy consult if needed  Please reach out to family, regarding dc " planning     Does not meet inpatient criteria at this time       I spent 45 minutes in the professional and overall care of this patient.      Medication Consent  Medication Consent: risks, benefits, side effects reviewed for all ordered meds    Maria D Ybarra MD

## 2025-06-29 NOTE — PROGRESS NOTES
Kriss Malloy is a 49 y.o. female on day 1 of admission presenting with Hypocalcemia.      Subjective   No acute events overnight. Patient intermittently refusing medications. Denies any numbness or tingling today.       Objective     Last Recorded Vitals  /81 (BP Location: Left arm, Patient Position: Sitting)   Pulse 62   Temp 36.1 °C (96.9 °F) (Temporal)   Resp 14   Wt 55.2 kg (121 lb 11.1 oz)   SpO2 97%   Intake/Output last 3 Shifts:    Intake/Output Summary (Last 24 hours) at 6/29/2025 1444  Last data filed at 6/29/2025 0952  Gross per 24 hour   Intake 1246.25 ml   Output --   Net 1246.25 ml       Admission Weight  Weight: 55.2 kg (121 lb 11.1 oz) (06/28/25 1415)    Daily Weight  06/28/25 : 55.2 kg (121 lb 11.1 oz)    Image Results  CT angio chest for pulmonary embolism  Narrative: STUDY:  CT Angiogram of the Chest; 6/28/2025 8:16  INDICATION:  Shortness of breath, elevated D-dimer.  COMPARISON:  2/22/2025 XR Chest, 6/14/2023 CTA Chest.  ACCESSION NUMBER(S):  [id number]  ORDERING CLINICIAN:  ENMANUEL RIVAS  TECHNIQUE:  CTA of the chest was performed with intravenous contrast.   Images are reviewed and processed at a workstation according to the CT  angiogram protocol with 3-D and/or MIP post processing imaging  generated.  Omnipaque 350 75 mL was administered intravenously.    Automated mA/kV exposure control was utilized and patient examination  was performed in strict accordance with principles of ALARA.  FINDINGS:  Pulmonary arteries are adequately opacified without acute or chronic  filling defects.  The thoracic aorta is normal in course and caliber  without dissection or aneurysm.  The heart is normal in size without pericardial effusion.  Thoracic  lymph nodes are not enlarged.  There is no pleural effusion, pleural thickening, or pneumothorax.   The airways are patent.  Biapical pleural-parenchymal scarring.  Mild mosaic perfusion.  Upper abdomen demonstrates no acute  pathology.  There are no acute fractures.  No suspicious bony lesions.  Impression: 1.No pulmonary embolism or other acute intrathoracic process.  2.Mild mosaic perfusion suggestive of air trapping or small  airways disease.  Signed by Lai Ordaz MD  XR chest 1 view  Narrative: Interpreted By:  Finkelstein, Evan,   STUDY:  XR CHEST 1 VIEW;  6/28/2025 5:26 am      INDICATION:  Signs/Symptoms:Chest Pain.          COMPARISON:  None.      ACCESSION NUMBER(S):  [id number]      ORDERING CLINICIAN:  LOLA BARTLETT      FINDINGS:          CARDIOMEDIASTINAL SILHOUETTE:  Cardiomediastinal silhouette is normal in size and configuration.      LUNGS:  No pulmonary consolidation, pleural effusion or pneumothorax.      ABDOMEN:  No remarkable upper abdominal findings.      BONES:  No acute osseous abnormality.      Impression: No radiographic evidence of acute cardiopulmonary pathology.      MACRO:  None.      Signed by: Evan Finkelstein 6/28/2025 5:28 AM  Dictation workstation:   [id number]      Physical Exam  Constitutional:       General: She is not in acute distress.  HENT:      Mouth/Throat:      Mouth: Mucous membranes are moist.   Eyes:      Extraocular Movements: Extraocular movements intact.   Cardiovascular:      Rate and Rhythm: Normal rate and regular rhythm.   Pulmonary:      Effort: Pulmonary effort is normal.      Breath sounds: Normal breath sounds.   Abdominal:      General: Abdomen is flat. Bowel sounds are normal.      Palpations: Abdomen is soft.   Musculoskeletal:         General: Normal range of motion.   Skin:     General: Skin is warm and dry.   Neurological:      General: No focal deficit present.      Mental Status: She is alert.      Motor: No weakness.   Psychiatric:      Comments: Pressured speech. Poor insight         Relevant Results  Scheduled medications  Scheduled Medications[1]  Continuous medications  Continuous Medications[2]  PRN medications  PRN Medications[3]                 Assessment & Plan  Hypocalcemia    Undifferentiated schizophrenia (Multi)    # Generalized weakness  # Hypocalcemia  # Hypomagnesemia  # History of polyglandular autoimmune deficiency (hypothyroidism, Melchor's disease, hypocalcemia)  -P/w worsening tingling, numbness, chest pain.  -Patient endorses not taking her medications for 3 weeks.  - Calcium 5.5, magnesium 1.28, potassium 3.1  Plan:  -Admit to stepdown  -Repeat calcium level, obtain ionized calcium, repeat BMP and magnesium this evening and am  - Replete as needed  - Endo consulted and discussed with Dr. Tay: hydrocortisone 20 mg every 8 hours, fludrocortisone, and calcitriol and vitamin D  - PT/OT     # Prolonged QT  # Chest pain, atypical: resolved  - Multifactorial, will continue to replete IV magnesium and Calcium  -Chest pain atypical, CT angiogram shows no acute findings.  Serial troponins negative.  - Obtain repeat EKG tomorrow  - Cardiac monitoring  - Hold Seroquel due to prolonged QT     # Pyuria  - Urinalysis abnormal  - Denies symptoms  - Continue to monitor     # Paranoid schizophrenia  # Schizoaffective disorder  - Continue risperidone, hold Seroquel for now given Qtc prolongation  - Psychiatry consulted, appreciate their assistance. Discussed with them. Patient has poor insight. Will increase risperidone given pressures speech.     VTE Prophylaxis: SCD      Dispo: Likely back to [facility] once medically stable.         Diego Raines MD           [1] calcitriol, 0.25 mcg, oral, BID  cholecalciferol, 125 mcg, oral, Daily  ferrous sulfate, 1 tablet, oral, Daily with breakfast  fludrocortisone, 0.1 mg, oral, Daily  hydrocortisone, 20 mg, oral, TID  pantoprazole, 40 mg, oral, Daily before breakfast  risperiDONE, 1 mg, oral, BID  [2] potassium chloride in 0.9%NaCl, 75 mL/hr, Last Rate: 75 mL/hr (06/29/25 8266)  [3] PRN medications: melatonin

## 2025-06-29 NOTE — ASSESSMENT & PLAN NOTE
On chart review  Dc seroquel due to qtc  Did take risperidone so far  Pharmacy consult if needed  Please reach out to family, regarding dc planning     Does not meet inpatient criteria at this time

## 2025-06-29 NOTE — PROGRESS NOTES
Physical Therapy                 Therapy Communication Note    Patient Name: Kriss Malloy  MRN: [id number]  Department: [address]  Room: [address] [place] [address]  Today's Date: 6/29/2025     Discipline: Physical Therapy    Missed Visit: PT Missed Visit: Yes (No)     Missed Visit Reason: Missed Visit Reason: Cancel (spoke with Pt's RN who states that Pt has no PT skilled needs, states that Pt is independently ambulating in the hallway. No evaluaion.)    Missed Time: Cancel    Comment:

## 2025-06-29 NOTE — CONSULTS
"Inpatient consult to Endocrinology  Consult performed by: Artemio Tay MD  Consult ordered by: Howard Streeter MD          Reason For Consult  Adrenal insufficiency    History Of Present Illness  Kriss Malloy is a 49 y.o. female presenting with weakness, hypocalcemia.    Diagnosed autoimmune polyglandular failure x26 years:  Hypoparathyroidism  Hypothyroidism, resolved per patient  Adrenal insufficiency    Patient had just been admitted to [facility] for 14 days, discharged 3 days before readmission.     Patient offers conflicting history that she was in a facility and \"they\" were not giving her the replacement hormones.   However, she also says she lives with her sister and handles her own medications.     Outpatient regimen per patient  Hydrocortisone 15 mg QAM, 10 mg afternoon and 5 mg PM  Fludrocortisone 0.1 mg/day  Calcitriol 0.25 mcg BID  Calcium 500 mg BID    Patient complains of general weakness and tingling, tightness breathing.     Past Medical History  She has a past medical history of Covington disease (Multi).    Surgical History  She has no past surgical history on file.     Social History  She reports that she has quit smoking. Her smoking use included cigarettes. She does not have any smokeless tobacco history on file. She reports that she does not currently use alcohol. She reports that she does not currently use drugs.    Family History  Family History[1]     Allergies  Patient has no known allergies.    Review of Systems   Constitutional:  Positive for fatigue. Negative for unexpected weight change.   Eyes:  Negative for visual disturbance.   Respiratory:  Positive for chest tightness. Negative for shortness of breath.    Cardiovascular:  Negative for chest pain.   Gastrointestinal:  Negative for abdominal pain, constipation, diarrhea, nausea and vomiting.   Endocrine:        As above   Genitourinary:  Negative for difficulty urinating and frequency.   Neurological:  Positive " "for weakness.        Tingling        Physical Exam  Constitutional:       General: She is not in acute distress.     Appearance: She is underweight.   HENT:      Head: Normocephalic.      Comments: Chvostek positive right side     Mouth/Throat:      Mouth: Mucous membranes are moist.   Eyes:      Extraocular Movements: Extraocular movements intact.   Neck:      Thyroid: No thyromegaly.   Cardiovascular:      Pulses:           Radial pulses are 2+ on the right side and 2+ on the left side.   Abdominal:      Tenderness: There is no abdominal tenderness.   Musculoskeletal:      Right lower leg: No edema.      Left lower leg: No edema.   Skin:     Comments: Fungal right thumbnail   Neurological:      Mental Status: She is alert.      Motor: No tremor.              Last Recorded Vitals  Blood pressure 125/81, pulse 75, temperature 36.1 °C (96.9 °F), temperature source Temporal, resp. rate 21, height 1.689 m (5' 6.5\"), weight 55.2 kg (121 lb 11.1 oz), SpO2 100%.    Relevant Results   Latest Reference Range & Units 06/28/25 04:40 06/28/25 14:42 06/29/25 05:10   GLUCOSE 74 - 99 mg/dL 96 121 (H) 126 (H)   SODIUM 136 - 145 mmol/L 143 140 139   POTASSIUM 3.5 - 5.3 mmol/L 3.1 (L) 4.0 3.7   CHLORIDE 98 - 107 mmol/L 106 105 105   Bicarbonate 21 - 32 mmol/L 27 25 25   Anion Gap 10 - 20 mmol/L 13 14 13   Blood Urea Nitrogen 6 - 23 mg/dL 19 15 11   Creatinine 0.50 - 1.05 mg/dL 0.53 0.57 0.55   EGFR >60 mL/min/1.73m*2 >90 >90 >90   Calcium 8.6 - 10.3 mg/dL 5.5 (LL) 5.8 (L) 5.5 (LL)   Albumin 3.4 - 5.0 g/dL 3.1 (L)          Assessment/Plan   Assessment & Plan  Hypocalcemia      HYPOCALCEMIA, PRIMARY HYPOPARATHYROIDISM  ADRENAL INSUFFICIENCY  Polyglandular autoimmune endocrine failure  No current hypothyroidism or diabetes  Symptomatic hypocalcemia  Tingling, weakness  Positive Chvostek sign  States she was not given her correct replacements, but she was documented discharged from [facility] 3 days prior to admission on calcitriol, " calcium, hydrocortisone and fludrocortisone.       RECOMMENDATIONS   Continue IV calcium and magnesium dosing until symptoms resolve  Resume calcitriol 0.25 mcg BID  Follow serial calcium    Can convert hydrocortisone to double her maintenance dose, 20 mg TID        Artemio Tay MD         [1] No family history on file.

## 2025-06-30 ENCOUNTER — APPOINTMENT (OUTPATIENT)
Dept: CARDIOLOGY | Facility: HOSPITAL | Age: 50
End: 2025-06-30
Payer: COMMERCIAL

## 2025-06-30 LAB
ANION GAP SERPL CALC-SCNC: 14 MMOL/L (ref 10–20)
ATRIAL RATE: 48 BPM
ATRIAL RATE: 62 BPM
ATRIAL RATE: 63 BPM
ATRIAL RATE: 73 BPM
BUN SERPL-MCNC: 13 MG/DL (ref 6–23)
CA-I BLD-SCNC: 0.61 MMOL/L (ref 1.1–1.33)
CALCIUM SERPL-MCNC: 6 MG/DL (ref 8.6–10.3)
CHLORIDE SERPL-SCNC: 106 MMOL/L (ref 98–107)
CO2 SERPL-SCNC: 25 MMOL/L (ref 21–32)
CREAT SERPL-MCNC: 0.56 MG/DL (ref 0.5–1.05)
EGFRCR SERPLBLD CKD-EPI 2021: >90 ML/MIN/1.73M*2
ERYTHROCYTE [DISTWIDTH] IN BLOOD BY AUTOMATED COUNT: 15.2 % (ref 11.5–14.5)
GLUCOSE SERPL-MCNC: 81 MG/DL (ref 74–99)
HCT VFR BLD AUTO: 29.1 % (ref 36–46)
HGB BLD-MCNC: 10.2 G/DL (ref 12–16)
MAGNESIUM SERPL-MCNC: 1.61 MG/DL (ref 1.6–2.4)
MCH RBC QN AUTO: 29.1 PG (ref 26–34)
MCHC RBC AUTO-ENTMCNC: 35.1 G/DL (ref 32–36)
MCV RBC AUTO: 83 FL (ref 80–100)
NRBC BLD-RTO: 0 /100 WBCS (ref 0–0)
P AXIS: 17 DEGREES
P AXIS: 24 DEGREES
P AXIS: 29 DEGREES
P AXIS: 57 DEGREES
P OFFSET: 183 MS
P OFFSET: 184 MS
P OFFSET: 186 MS
P OFFSET: 192 MS
P ONSET: 152 MS
P ONSET: 154 MS
P ONSET: 155 MS
P ONSET: 164 MS
PLATELET # BLD AUTO: 116 X10*3/UL (ref 150–450)
POTASSIUM SERPL-SCNC: 3.7 MMOL/L (ref 3.5–5.3)
PR INTERVAL: 116 MS
PR INTERVAL: 132 MS
PR INTERVAL: 134 MS
PR INTERVAL: 138 MS
Q ONSET: 221 MS
Q ONSET: 222 MS
QRS COUNT: 10 BEATS
QRS COUNT: 11 BEATS
QRS COUNT: 12 BEATS
QRS COUNT: 8 BEATS
QRS DURATION: 68 MS
QRS DURATION: 70 MS
QRS DURATION: 74 MS
QRS DURATION: 74 MS
QT INTERVAL: 488 MS
QT INTERVAL: 532 MS
QT INTERVAL: 560 MS
QT INTERVAL: 598 MS
QTC CALCULATION(BAZETT): 534 MS
QTC CALCULATION(BAZETT): 537 MS
QTC CALCULATION(BAZETT): 539 MS
QTC CALCULATION(BAZETT): 573 MS
QTC FREDERICIA: 521 MS
QTC FREDERICIA: 538 MS
QTC FREDERICIA: 555 MS
QTC FREDERICIA: 569 MS
R AXIS: 57 DEGREES
R AXIS: 62 DEGREES
R AXIS: 71 DEGREES
R AXIS: 78 DEGREES
RBC # BLD AUTO: 3.5 X10*6/UL (ref 4–5.2)
SODIUM SERPL-SCNC: 141 MMOL/L (ref 136–145)
T AXIS: 103 DEGREES
T AXIS: 129 DEGREES
T AXIS: 129 DEGREES
T AXIS: 134 DEGREES
T OFFSET: 465 MS
T OFFSET: 488 MS
T OFFSET: 501 MS
T OFFSET: 520 MS
VENTRICULAR RATE: 48 BPM
VENTRICULAR RATE: 62 BPM
VENTRICULAR RATE: 63 BPM
VENTRICULAR RATE: 73 BPM
WBC # BLD AUTO: 11.4 X10*3/UL (ref 4.4–11.3)

## 2025-06-30 PROCEDURE — 99231 SBSQ HOSP IP/OBS SF/LOW 25: CPT | Performed by: INTERNAL MEDICINE

## 2025-06-30 PROCEDURE — 2500000001 HC RX 250 WO HCPCS SELF ADMINISTERED DRUGS (ALT 637 FOR MEDICARE OP): Performed by: INTERNAL MEDICINE

## 2025-06-30 PROCEDURE — 2500000001 HC RX 250 WO HCPCS SELF ADMINISTERED DRUGS (ALT 637 FOR MEDICARE OP): Performed by: HOSPITALIST

## 2025-06-30 PROCEDURE — 2500000004 HC RX 250 GENERAL PHARMACY W/ HCPCS (ALT 636 FOR OP/ED): Mod: JZ | Performed by: HOSPITALIST

## 2025-06-30 PROCEDURE — 2500000002 HC RX 250 W HCPCS SELF ADMINISTERED DRUGS (ALT 637 FOR MEDICARE OP, ALT 636 FOR OP/ED): Performed by: STUDENT IN AN ORGANIZED HEALTH CARE EDUCATION/TRAINING PROGRAM

## 2025-06-30 PROCEDURE — 82330 ASSAY OF CALCIUM: CPT | Performed by: STUDENT IN AN ORGANIZED HEALTH CARE EDUCATION/TRAINING PROGRAM

## 2025-06-30 PROCEDURE — 85027 COMPLETE CBC AUTOMATED: CPT | Performed by: STUDENT IN AN ORGANIZED HEALTH CARE EDUCATION/TRAINING PROGRAM

## 2025-06-30 PROCEDURE — 36415 COLL VENOUS BLD VENIPUNCTURE: CPT | Performed by: STUDENT IN AN ORGANIZED HEALTH CARE EDUCATION/TRAINING PROGRAM

## 2025-06-30 PROCEDURE — 2500000005 HC RX 250 GENERAL PHARMACY W/O HCPCS: Performed by: NURSE PRACTITIONER

## 2025-06-30 PROCEDURE — 2500000004 HC RX 250 GENERAL PHARMACY W/ HCPCS (ALT 636 FOR OP/ED): Performed by: INTERNAL MEDICINE

## 2025-06-30 PROCEDURE — 80048 BASIC METABOLIC PNL TOTAL CA: CPT | Performed by: STUDENT IN AN ORGANIZED HEALTH CARE EDUCATION/TRAINING PROGRAM

## 2025-06-30 PROCEDURE — 99233 SBSQ HOSP IP/OBS HIGH 50: CPT | Performed by: INTERNAL MEDICINE

## 2025-06-30 PROCEDURE — 93005 ELECTROCARDIOGRAM TRACING: CPT

## 2025-06-30 PROCEDURE — 2500000001 HC RX 250 WO HCPCS SELF ADMINISTERED DRUGS (ALT 637 FOR MEDICARE OP): Performed by: STUDENT IN AN ORGANIZED HEALTH CARE EDUCATION/TRAINING PROGRAM

## 2025-06-30 PROCEDURE — 1200000002 HC GENERAL ROOM WITH TELEMETRY DAILY

## 2025-06-30 PROCEDURE — 83735 ASSAY OF MAGNESIUM: CPT | Performed by: STUDENT IN AN ORGANIZED HEALTH CARE EDUCATION/TRAINING PROGRAM

## 2025-06-30 RX ORDER — CALCIUM CARBONATE 500(1250)
1250 TABLET ORAL 4 TIMES DAILY
Status: DISCONTINUED | OUTPATIENT
Start: 2025-06-30 | End: 2025-07-03 | Stop reason: HOSPADM

## 2025-06-30 RX ORDER — HALOPERIDOL LACTATE 5 MG/ML
5 INJECTION, SOLUTION INTRAMUSCULAR EVERY 6 HOURS PRN
Status: DISCONTINUED | OUTPATIENT
Start: 2025-06-30 | End: 2025-07-03 | Stop reason: HOSPADM

## 2025-06-30 RX ORDER — ZIPRASIDONE MESYLATE 20 MG/ML
10 INJECTION, POWDER, LYOPHILIZED, FOR SOLUTION INTRAMUSCULAR ONCE
Status: COMPLETED | OUTPATIENT
Start: 2025-06-30 | End: 2025-06-30

## 2025-06-30 RX ORDER — LORAZEPAM 2 MG/ML
2 INJECTION INTRAMUSCULAR ONCE
Status: COMPLETED | OUTPATIENT
Start: 2025-06-30 | End: 2025-06-30

## 2025-06-30 RX ORDER — CALCIUM GLUCONATE 20 MG/ML
1 INJECTION, SOLUTION INTRAVENOUS ONCE
Status: COMPLETED | OUTPATIENT
Start: 2025-06-30 | End: 2025-06-30

## 2025-06-30 RX ADMIN — CALCIUM 1 TABLET: 500 TABLET ORAL at 20:48

## 2025-06-30 RX ADMIN — CALCIUM 1 TABLET: 500 TABLET ORAL at 17:03

## 2025-06-30 RX ADMIN — Medication 3 MG: at 20:48

## 2025-06-30 RX ADMIN — FLUDROCORTISONE ACETATE 0.1 MG: 0.1 TABLET ORAL at 08:51

## 2025-06-30 RX ADMIN — HYDROCORTISONE 20 MG: 10 TABLET ORAL at 20:48

## 2025-06-30 RX ADMIN — FERROUS SULFATE TAB 325 MG (65 MG ELEMENTAL FE) 1 TABLET: 325 (65 FE) TAB at 08:50

## 2025-06-30 RX ADMIN — RISPERIDONE 1 MG: 1 TABLET, FILM COATED ORAL at 20:48

## 2025-06-30 RX ADMIN — Medication 125 MCG: at 08:51

## 2025-06-30 RX ADMIN — CALCITRIOL CAPSULES 0.25 MCG 0.25 MCG: 0.25 CAPSULE ORAL at 20:48

## 2025-06-30 RX ADMIN — CALCIUM 1 TABLET: 500 TABLET ORAL at 12:45

## 2025-06-30 RX ADMIN — HYDROCORTISONE 20 MG: 10 TABLET ORAL at 17:03

## 2025-06-30 RX ADMIN — LORAZEPAM 2 MG: 2 INJECTION INTRAMUSCULAR; INTRAVENOUS at 12:45

## 2025-06-30 RX ADMIN — CALCIUM 1 TABLET: 500 TABLET ORAL at 09:00

## 2025-06-30 RX ADMIN — HYDROCORTISONE 20 MG: 10 TABLET ORAL at 08:51

## 2025-06-30 RX ADMIN — CALCITRIOL CAPSULES 0.25 MCG 0.25 MCG: 0.25 CAPSULE ORAL at 08:51

## 2025-06-30 RX ADMIN — SODIUM CHLORIDE AND POTASSIUM CHLORIDE 75 ML/HR: 9; 1.49 INJECTION, SOLUTION INTRAVENOUS at 06:11

## 2025-06-30 RX ADMIN — ZIPRASIDONE MESYLATE 10 MG: 20 INJECTION, POWDER, LYOPHILIZED, FOR SOLUTION INTRAMUSCULAR at 11:31

## 2025-06-30 RX ADMIN — CALCIUM GLUCONATE 1 G: 20 INJECTION, SOLUTION INTRAVENOUS at 14:00

## 2025-06-30 ASSESSMENT — COGNITIVE AND FUNCTIONAL STATUS - GENERAL
MOBILITY SCORE: 24
DAILY ACTIVITIY SCORE: 24

## 2025-06-30 ASSESSMENT — PAIN - FUNCTIONAL ASSESSMENT: PAIN_FUNCTIONAL_ASSESSMENT: 0-10

## 2025-06-30 ASSESSMENT — PAIN SCALES - GENERAL
PAINLEVEL_OUTOF10: 0 - NO PAIN
PAINLEVEL_OUTOF10: 0 - NO PAIN

## 2025-06-30 NOTE — PROGRESS NOTES
Patient: Kriss Malloy  Room/bed: 243/243-A  Admitted on: 6/28/2025    Age: 49 y.o.   Gender: female  Code Status:  Full Code   Admitting Dx: Hypocalcemia [E83.51]    MRN: [id number]  PCP: Isabel Thomson MD       Subjective   Walking in [place] frequently. No breakfast this am. Says she will not take the risperadone, she 'is allowed to think out loud'.     Objective    Physical Exam  Constitutional:       Appearance: Normal appearance.   HENT:      Head: Normocephalic.      Nose: Nose normal.      Mouth/Throat:      Mouth: Mucous membranes are dry.   Eyes:      Extraocular Movements: Extraocular movements intact.      Pupils: Pupils are equal, round, and reactive to light.   Cardiovascular:      Rate and Rhythm: Normal rate and regular rhythm.      Pulses: Normal pulses.   Pulmonary:      Effort: Pulmonary effort is normal.      Breath sounds: Normal breath sounds.   Abdominal:      General: Bowel sounds are normal.   Musculoskeletal:         General: Normal range of motion.   Skin:     General: Skin is warm and dry.   Neurological:      Mental Status: She is alert.      Comments: Ambulating in [place] without difficulty  Cranial nerves intact   Psychiatric:      Comments: Fair eye contact  Appears slightly manic.  Constantly talking, appears to be externally stimulated, although when spoke to can refocus briefly and answer appropriately.   In constant movement          Temp:  [36.4 °C (97.6 °F)-36.9 °C (98.4 °F)] 36.9 °C (98.4 °F)  Heart Rate:  [45-78] 78  Resp:  [12-26] 24  BP: (105-137)/(74-84) 105/74    Vitals:    06/28/25 1415   Weight: 55.2 kg (121 lb 11.1 oz)     Labs pending today        I/Os    Intake/Output Summary (Last 24 hours) at 6/30/2025 0858  Last data filed at 6/30/2025 0610  Gross per 24 hour   Intake 2223.75 ml   Output --   Net 2223.75 ml       Labs:   Results from last 72 hours   Lab Units 06/29/25  1656 06/29/25  0510 06/28/25  1442   SODIUM mmol/L 142 139 140   POTASSIUM mmol/L 4.1 3.7  "4.0   CHLORIDE mmol/L 104 105 105   CO2 mmol/L 25 25 25   BUN mg/dL 11 11 15   CREATININE mg/dL 0.65 0.55 0.57   GLUCOSE mg/dL 123* 126* 121*   CALCIUM mg/dL 6.1* 5.5* 5.8*   ANION GAP mmol/L 17 13 14   EGFR mL/min/1.73m*2 >90 >90 >90      Results from last 72 hours   Lab Units 06/29/25  0510 06/28/25  0440   WBC AUTO x10*3/uL 9.8 9.8   HEMOGLOBIN g/dL 9.2* 9.6*   HEMATOCRIT % 28.7* 28.6*   PLATELETS AUTO x10*3/uL 169 198   NEUTROS PCT AUTO %  --  68.4   LYMPHS PCT AUTO %  --  21.6   MONOS PCT AUTO %  --  9.5   EOS PCT AUTO %  --  0.1      Lab Results   Component Value Date    CALCIUM 6.1 (L) 06/29/2025    PHOS 4.9 04/17/2025      Lab Results   Component Value Date    CRP 0.10 06/17/2023      [unfilled]     Micro/ID:   No results found for the last 90 days.                   No lab exists for component: \"AGALPCRNB\"   .ID  Lab Results   Component Value Date    URINECULTURE  06/28/2025     Clinically insignificant growth based on current clinical standards.    BLOODCULT  06/14/2023     No Growth at 1 days~No Growth at 2 days~No Growth at 3 days~NO GROWTH at 4 days - FINAL REPORT    BLOODCULT  06/14/2023     No Growth at 1 days~No Growth at 2 days~No Growth at 3 days~NO GROWTH at 4 days - FINAL REPORT       Images:  CT angio chest for pulmonary embolism  Narrative: STUDY:  CT Angiogram of the Chest; 6/28/2025 8:16  INDICATION:  Shortness of breath, elevated D-dimer.  COMPARISON:  2/22/2025 XR Chest, 6/14/2023 CTA Chest.  ACCESSION NUMBER(S):  [id number]  ORDERING CLINICIAN:  ENMANUEL RIVAS  TECHNIQUE:  CTA of the chest was performed with intravenous contrast.   Images are reviewed and processed at a workstation according to the CT  angiogram protocol with 3-D and/or MIP post processing imaging  generated.  Omnipaque 350 75 mL was administered intravenously.    Automated mA/kV exposure control was utilized and patient examination  was performed in strict accordance with principles of " ALARA.  FINDINGS:  Pulmonary arteries are adequately opacified without acute or chronic  filling defects.  The thoracic aorta is normal in course and caliber  without dissection or aneurysm.  The heart is normal in size without pericardial effusion.  Thoracic  lymph nodes are not enlarged.  There is no pleural effusion, pleural thickening, or pneumothorax.   The airways are patent.  Biapical pleural-parenchymal scarring.  Mild mosaic perfusion.  Upper abdomen demonstrates no acute pathology.  There are no acute fractures.  No suspicious bony lesions.  Impression: 1.No pulmonary embolism or other acute intrathoracic process.  2.Mild mosaic perfusion suggestive of air trapping or small  airways disease.  Signed by Lai Ordaz MD  XR chest 1 view  Narrative: Interpreted By:  Finkelstein, Evan,   STUDY:  XR CHEST 1 VIEW;  6/28/2025 5:26 am      INDICATION:  Signs/Symptoms:Chest Pain.          COMPARISON:  None.      ACCESSION NUMBER(S):  [id number]      ORDERING CLINICIAN:  LOLA BARTLETT      FINDINGS:          CARDIOMEDIASTINAL SILHOUETTE:  Cardiomediastinal silhouette is normal in size and configuration.      LUNGS:  No pulmonary consolidation, pleural effusion or pneumothorax.      ABDOMEN:  No remarkable upper abdominal findings.      BONES:  No acute osseous abnormality.      Impression: No radiographic evidence of acute cardiopulmonary pathology.      MACRO:  None.      Signed by: Evan Finkelstein 6/28/2025 5:28 AM  Dictation workstation:   [id number]       Meds    Scheduled medications  Scheduled Medications[1]  Continuous medications  Continuous Medications[2]  PRN medications  PRN Medications[3]     Assessment and Plan    Kriss Malloy is a 49 y.o. female transferred from [facility] with multiple electrolyte abnormalities due to her condition of polyglandular autoimmune deficiency syndrome.   Addisons. Continue florinef and supplemental dosing of hydrocortisone at this time.   Hypocalcemia. Still working  on this. Has been difficult to normalize. Appreciate endocrine assistance  Hypothyroid.   Prolonged QT. Pharmacy on consult  Paranoid schizophrenia. Schizoaffective disorder. She has not been agreeing to take the risperadone. Psych did evaluate and stated she did not meet inpt criteria for admission. I disagree at this point. Will ask to re-evaluate. Has been requiring sitter. Has been whispering threats while ambulating. Needs medicated .     Alicia Martinez MD         [1] calcitriol, 0.25 mcg, oral, BID  calcium carbonate, 1,250 mg of calcium carbonate, oral, 4x daily  cholecalciferol, 125 mcg, oral, Daily  ferrous sulfate, 1 tablet, oral, Daily with breakfast  fludrocortisone, 0.1 mg, oral, Daily  hydrocortisone, 20 mg, oral, TID  pantoprazole, 40 mg, oral, Daily before breakfast  risperiDONE, 1 mg, oral, BID  [2] potassium chloride in 0.9%NaCl, 75 mL/hr, Last Rate: 75 mL/hr (06/30/25 0611)  [3] PRN medications: melatonin

## 2025-06-30 NOTE — PROGRESS NOTES
06/30/25 0959   Discharge Planning   Living Arrangements Other (Comment)  (From [facility])   Support Systems Family members   Assistance Needed A&OX2-3 (alert and oriented to person,place,situation -year stated '2025'then'2026; room air baseline and currently room air-denies CPAP;drives but no car; no PCP   Type of Residence Inpatient behavioral health unit/facility   Do you have animals or pets at home? No   Who is requesting discharge planning? Provider   Home or Post Acute Services Other (Comment)   Expected Discharge Disposition [place]  (TBD- Patient from [facility]. Once medically cleared patient will need an eval from psych for further dc planning. SW following.)   Does the patient need discharge transport arranged? Yes   RoundTrip coordination needed? Yes   Has discharge transport been arranged? No   Stroke Family Assessment   Stroke Family Assessment Needed No   Intensity of Service   Intensity of Service 0-30 min

## 2025-06-30 NOTE — CARE PLAN
The patient's goals for the shift include  to be able to walk    The clinical goals for the shift include pt claudio remian hemodynamically stable through shift

## 2025-06-30 NOTE — PROGRESS NOTES
"Kriss Malloy is a 49 y.o. female on day 2 of admission presenting with Hypocalcemia.    Subjective   Patient states she didn't get any IV bags of calcium, refuses oral calcium because that is \"outpatient therapy.\"    Records indicate calcium gluconate 1 gm IV given at 21:49, 18:49, 10:31 and 08:20 yesterday.      Scheduled medications  Scheduled Medications[1]  Continuous medications  Continuous Medications[2]  PRN medications  PRN Medications[3]      Objective   Physical Exam  Constitutional:       Comments: Patient standing, beating her chest with a fist, unplugging IV pole.   Neurological:      Mental Status: She is alert.   Psychiatric:         Behavior: Behavior is agitated.         Last Recorded Vitals  Blood pressure 105/74, pulse 78, temperature 36.9 °C (98.4 °F), temperature source Temporal, resp. rate 24, height 1.689 m (5' 6.5\"), weight 55.2 kg (121 lb 11.1 oz), SpO2 99%.  Intake/Output last 3 Shifts:  I/O last 3 completed shifts:  In: 2986.3 (54.1 mL/kg) [P.O.:360; I.V.:2426.3 (44 mL/kg); IV Piggyback:200]  Out: - (0 mL/kg)   Weight: 55.2 kg     Relevant Results  Results from last 7 days   Lab Units 06/29/25  1656 06/29/25  0510 06/28/25  1442 06/28/25  0440   GLUCOSE mg/dL 123* 126* 121* 96      Latest Reference Range & Units 06/29/25 16:56   GLUCOSE 74 - 99 mg/dL 123 (H)   SODIUM 136 - 145 mmol/L 142   POTASSIUM 3.5 - 5.3 mmol/L 4.1   CHLORIDE 98 - 107 mmol/L 104   Bicarbonate 21 - 32 mmol/L 25   Anion Gap 10 - 20 mmol/L 17   Blood Urea Nitrogen 6 - 23 mg/dL 11   Creatinine 0.50 - 1.05 mg/dL 0.65   EGFR >60 mL/min/1.73m*2 >90   Calcium 8.6 - 10.3 mg/dL 6.1 (L)         Assessment & Plan  Hypocalcemia    Undifferentiated schizophrenia (Multi)    HYPOCALCEMIA, PRIMARY HYPOPARATHYROIDISM  ADRENAL INSUFFICIENCY  Polyglandular autoimmune endocrine failure  Symptomatic hypocalcemia  Tingling, weakness  Calcium improving last night, no lab results yet today.        RECOMMENDATIONS   Resume calcitriol " 0.25 mcg BID  Calcium carbonate 1250 mg (500 mg elemental) 4 times daily.  Recommend oral calcium as the maintenance with IV calcium for symptoms.  She rejects that plan.   Follow serial calcium     Hydrocortisone at double her maintenance dose, 20 mg TID      Artemio Tay MD         [1] calcitriol, 0.25 mcg, oral, BID  cholecalciferol, 125 mcg, oral, Daily  ferrous sulfate, 1 tablet, oral, Daily with breakfast  fludrocortisone, 0.1 mg, oral, Daily  hydrocortisone, 20 mg, oral, TID  pantoprazole, 40 mg, oral, Daily before breakfast  risperiDONE, 1 mg, oral, BID  [2] potassium chloride in 0.9%NaCl, 75 mL/hr, Last Rate: 75 mL/hr (06/30/25 0611)  [3] PRN medications: melatonin

## 2025-07-01 LAB
ANION GAP SERPL CALC-SCNC: 15 MMOL/L (ref 10–20)
BUN SERPL-MCNC: 12 MG/DL (ref 6–23)
CALCIUM SERPL-MCNC: 6 MG/DL (ref 8.6–10.3)
CHLORIDE SERPL-SCNC: 104 MMOL/L (ref 98–107)
CO2 SERPL-SCNC: 25 MMOL/L (ref 21–32)
CREAT SERPL-MCNC: 0.58 MG/DL (ref 0.5–1.05)
EGFRCR SERPLBLD CKD-EPI 2021: >90 ML/MIN/1.73M*2
GLUCOSE SERPL-MCNC: 134 MG/DL (ref 74–99)
HOLD SPECIMEN: NORMAL
PHOSPHATE SERPL-MCNC: 5.9 MG/DL (ref 2.5–4.9)
POTASSIUM SERPL-SCNC: 3.2 MMOL/L (ref 3.5–5.3)
SODIUM SERPL-SCNC: 141 MMOL/L (ref 136–145)

## 2025-07-01 PROCEDURE — 36415 COLL VENOUS BLD VENIPUNCTURE: CPT | Performed by: INTERNAL MEDICINE

## 2025-07-01 PROCEDURE — 2500000001 HC RX 250 WO HCPCS SELF ADMINISTERED DRUGS (ALT 637 FOR MEDICARE OP): Performed by: INTERNAL MEDICINE

## 2025-07-01 PROCEDURE — 2500000002 HC RX 250 W HCPCS SELF ADMINISTERED DRUGS (ALT 637 FOR MEDICARE OP, ALT 636 FOR OP/ED): Performed by: STUDENT IN AN ORGANIZED HEALTH CARE EDUCATION/TRAINING PROGRAM

## 2025-07-01 PROCEDURE — 84100 ASSAY OF PHOSPHORUS: CPT | Performed by: INTERNAL MEDICINE

## 2025-07-01 PROCEDURE — 1200000002 HC GENERAL ROOM WITH TELEMETRY DAILY

## 2025-07-01 PROCEDURE — 2500000001 HC RX 250 WO HCPCS SELF ADMINISTERED DRUGS (ALT 637 FOR MEDICARE OP): Performed by: STUDENT IN AN ORGANIZED HEALTH CARE EDUCATION/TRAINING PROGRAM

## 2025-07-01 PROCEDURE — 99231 SBSQ HOSP IP/OBS SF/LOW 25: CPT | Performed by: INTERNAL MEDICINE

## 2025-07-01 PROCEDURE — 2500000004 HC RX 250 GENERAL PHARMACY W/ HCPCS (ALT 636 FOR OP/ED): Performed by: INTERNAL MEDICINE

## 2025-07-01 PROCEDURE — 2500000001 HC RX 250 WO HCPCS SELF ADMINISTERED DRUGS (ALT 637 FOR MEDICARE OP): Performed by: HOSPITALIST

## 2025-07-01 PROCEDURE — 82374 ASSAY BLOOD CARBON DIOXIDE: CPT | Performed by: INTERNAL MEDICINE

## 2025-07-01 PROCEDURE — 99232 SBSQ HOSP IP/OBS MODERATE 35: CPT | Performed by: FAMILY MEDICINE

## 2025-07-01 RX ORDER — CALCITRIOL 0.25 UG/1
0.5 CAPSULE ORAL 2 TIMES DAILY
Status: DISCONTINUED | OUTPATIENT
Start: 2025-07-01 | End: 2025-07-03 | Stop reason: HOSPADM

## 2025-07-01 RX ORDER — CALCIUM GLUCONATE 98 MG/ML
1 INJECTION, SOLUTION INTRAVENOUS ONCE
Status: DISCONTINUED | OUTPATIENT
Start: 2025-07-01 | End: 2025-07-01

## 2025-07-01 RX ORDER — CALCIUM GLUCONATE 20 MG/ML
1 INJECTION, SOLUTION INTRAVENOUS ONCE
Status: COMPLETED | OUTPATIENT
Start: 2025-07-01 | End: 2025-07-01

## 2025-07-01 RX ORDER — SERTRALINE HYDROCHLORIDE 50 MG/1
25 TABLET, FILM COATED ORAL DAILY
Status: DISCONTINUED | OUTPATIENT
Start: 2025-07-02 | End: 2025-07-03 | Stop reason: HOSPADM

## 2025-07-01 RX ADMIN — CALCIUM 1 TABLET: 500 TABLET ORAL at 13:37

## 2025-07-01 RX ADMIN — CALCITRIOL CAPSULES 0.25 MCG 0.5 MCG: 0.25 CAPSULE ORAL at 09:39

## 2025-07-01 RX ADMIN — CALCIUM GLUCONATE 1 G: 20 INJECTION, SOLUTION INTRAVENOUS at 09:59

## 2025-07-01 RX ADMIN — CALCIUM 1 TABLET: 500 TABLET ORAL at 16:38

## 2025-07-01 RX ADMIN — FLUDROCORTISONE ACETATE 0.1 MG: 0.1 TABLET ORAL at 09:40

## 2025-07-01 RX ADMIN — HYDROCORTISONE 20 MG: 10 TABLET ORAL at 16:38

## 2025-07-01 RX ADMIN — Medication 125 MCG: at 09:39

## 2025-07-01 RX ADMIN — RISPERIDONE 1 MG: 1 TABLET, FILM COATED ORAL at 09:40

## 2025-07-01 RX ADMIN — CALCITRIOL CAPSULES 0.25 MCG 0.5 MCG: 0.25 CAPSULE ORAL at 20:44

## 2025-07-01 RX ADMIN — HYDROCORTISONE 20 MG: 10 TABLET ORAL at 20:44

## 2025-07-01 RX ADMIN — FERROUS SULFATE TAB 325 MG (65 MG ELEMENTAL FE) 1 TABLET: 325 (65 FE) TAB at 09:38

## 2025-07-01 RX ADMIN — CALCIUM 1 TABLET: 500 TABLET ORAL at 06:06

## 2025-07-01 RX ADMIN — HYDROCORTISONE 20 MG: 10 TABLET ORAL at 09:39

## 2025-07-01 RX ADMIN — RISPERIDONE 1 MG: 1 TABLET, FILM COATED ORAL at 20:44

## 2025-07-01 RX ADMIN — CALCIUM 1 TABLET: 500 TABLET ORAL at 20:44

## 2025-07-01 ASSESSMENT — PAIN - FUNCTIONAL ASSESSMENT
PAIN_FUNCTIONAL_ASSESSMENT: 0-10
PAIN_FUNCTIONAL_ASSESSMENT: 0-10

## 2025-07-01 ASSESSMENT — COGNITIVE AND FUNCTIONAL STATUS - GENERAL
DAILY ACTIVITIY SCORE: 24
MOBILITY SCORE: 24

## 2025-07-01 ASSESSMENT — PAIN SCALES - GENERAL
PAINLEVEL_OUTOF10: 0 - NO PAIN
PAINLEVEL_OUTOF10: 0 - NO PAIN

## 2025-07-01 NOTE — PROGRESS NOTES
07/01/25 1042   Discharge Planning   Assistance Needed Pt usually lives in [place] with her sister.  Called to sister re: discharge planning.  Sister states pt refuses medications, hallucinates and talks to herself at home.  She states pt's day consists of walking around, eating, visiting family etc.  Pt use to drive, but does not anymore.  Pt does not see community providers.  Sister states she is ok with pt coming home as long as pt has her Melchor's medication and something like Zoloft for her anxiety. Pt has not been on Zoloft, but sister states she thinks pt has anxiety.  Explained psychiatrist is recommending guardianship, but sister states she is unable to do this and there is no one else to do it as well.

## 2025-07-01 NOTE — CARE PLAN
The patient's goals for the shift include      The clinical goals for the shift include To remain safe throughout the shift.    Over the shift, the patient did not make progress toward the following goals. Barriers to progression include . Recommendations to address these barriers include .      Problem: Chronic Conditions and Co-morbidities  Goal: Patient's chronic conditions and co-morbidity symptoms are monitored and maintained or improved  Outcome: Progressing

## 2025-07-01 NOTE — PROGRESS NOTES
Kriss Malloy is a 49 y.o. female on day 3 of admission presenting with Hypocalcemia.      Subjective   Patient resting in bed, continues to talk to herself but will answer questions appropriately. States she plans on going home to live with her sister which is where she came from. No episodes of agitation overnight or today.     Objective     Last Recorded Vitals  /88 (BP Location: Left arm, Patient Position: Lying)   Pulse 64   Temp 37 °C (98.6 °F) (Temporal)   Resp 20   Wt 55.2 kg (121 lb 11.1 oz)   SpO2 99%   Intake/Output last 3 Shifts:    Intake/Output Summary (Last 24 hours) at 7/1/2025 1750  Last data filed at 7/1/2025 1029  Gross per 24 hour   Intake 550 ml   Output --   Net 550 ml       Admission Weight  Weight: 55.2 kg (121 lb 11.1 oz) (06/28/25 1415)    Daily Weight  06/28/25 : 55.2 kg (121 lb 11.1 oz)        Physical Exam  Constitutional:  no acute distress, constantly talking but unclear what she is saying  Skin: warm and dry  Head/Neck: Normocephalic, atraumatic  Eyes: clear sclera  ENMT: mucous membranes moist  Cardio: Regular rate and rhythm  Resp: CTA bilaterally, good respiratory effort  Gastrointestinal: Soft, nontender, nondistended  Musculoskeletal: ambulating in hallway  Psychological: obviously stimulated externally, denies SI/HI but constantly talking to someone else consistent with auditory hallucinations    Relevant Results  Scheduled medications  Scheduled Medications[1]  Continuous medications  Continuous Medications[2]  PRN medications  PRN Medications[3]    Results for orders placed or performed during the hospital encounter of 06/28/25 (from the past 24 hours)   Basic metabolic panel   Result Value Ref Range    Glucose 134 (H) 74 - 99 mg/dL    Sodium 141 136 - 145 mmol/L    Potassium 3.2 (L) 3.5 - 5.3 mmol/L    Chloride 104 98 - 107 mmol/L    Bicarbonate 25 21 - 32 mmol/L    Anion Gap 15 10 - 20 mmol/L    Urea Nitrogen 12 6 - 23 mg/dL    Creatinine 0.58 0.50 - 1.05  mg/dL    eGFR >90 >60 mL/min/1.73m*2    Calcium 6.0 (L) 8.6 - 10.3 mg/dL   Phosphorus   Result Value Ref Range    Phosphorus 5.9 (H) 2.5 - 4.9 mg/dL   Lavender Top   Result Value Ref Range    Extra Tube Hold for add-ons.          Assessment & Plan    50yo female with PMH of Addisons disease with hypocalcemia, hypothyroidism, and paranoid schizophreia with schizoaffective disorder.    Addisons disease with hypocalcemia  - continue hydrocortisone at double maintenance(20mg TID)  - discussed with sandro who is following, will give IV calcium gluconate today and then increase calcitriol to 0.5mcg BID  - will continue serial calciums    Paranoid schizophrenia  - continue risperidone  - start new zoloft per patient's sister's request for potential anxiety  - psych consulted, despite constant auditory hallucinations, appears this is chronic for patient and therefore does not meet inpt criteria    DVT Proph: ambulations    Dispo: Patient requires close inpatient monitoring in setting of symptomatic hypocalcemia requiring PO and IV repletion, no plans for discharge at this time.    Alma Rosa Cox DO           [1] calcitriol, 0.5 mcg, oral, BID  calcium carbonate, 1,250 mg of calcium carbonate, oral, 4x daily  cholecalciferol, 125 mcg, oral, Daily  ferrous sulfate, 1 tablet, oral, Daily with breakfast  fludrocortisone, 0.1 mg, oral, Daily  hydrocortisone, 20 mg, oral, TID  pantoprazole, 40 mg, oral, Daily before breakfast  risperiDONE, 1 mg, oral, BID  [START ON 7/2/2025] sertraline, 25 mg, oral, Daily  [2]    [3] PRN medications: haloperidol lactate, melatonin

## 2025-07-01 NOTE — PROGRESS NOTES
"Kriss Malloy is a 49 y.o. female on day 3 of admission presenting with Hypocalcemia.    Subjective   Calcium carbonate 1250 mg, 4 doses yesterday  One dose IV calcium gluconate at 14:00   Calcitriol 0.25 mg two doses yesterday    Persistent tingling \"all over\" per patient.  She is concerned that she is not getting IV \"bags\" of calcium.    Scheduled medications  Scheduled Medications[1]  Continuous medications  Continuous Medications[2]  PRN medications  PRN Medications[3]      Objective   Physical Exam  Constitutional:       General: She is not in acute distress.  Neurological:      Mental Status: She is alert.         Last Recorded Vitals  Blood pressure 133/88, pulse 64, temperature 37 °C (98.6 °F), temperature source Temporal, resp. rate 20, height 1.689 m (5' 6.5\"), weight 55.2 kg (121 lb 11.1 oz), SpO2 99%.  Intake/Output last 3 Shifts:  I/O last 3 completed shifts:  In: 1733.8 (31.4 mL/kg) [P.O.:620; I.V.:1013.8 (18.4 mL/kg); IV Piggyback:100]  Out: - (0 mL/kg)   Weight: 55.2 kg     Relevant Results   Latest Reference Range & Units 07/01/25 05:26   GLUCOSE 74 - 99 mg/dL 134 (H)   SODIUM 136 - 145 mmol/L 141   POTASSIUM 3.5 - 5.3 mmol/L 3.2 (L)   CHLORIDE 98 - 107 mmol/L 104   Bicarbonate 21 - 32 mmol/L 25   Anion Gap 10 - 20 mmol/L 15   Blood Urea Nitrogen 6 - 23 mg/dL 12   Creatinine 0.50 - 1.05 mg/dL 0.58   EGFR >60 mL/min/1.73m*2 >90   Calcium 8.6 - 10.3 mg/dL 6.0 (L)   PHOSPHORUS 2.5 - 4.9 mg/dL 5.9 (H)         Assessment & Plan  Hypocalcemia    Undifferentiated schizophrenia (Multi)    HYPOCALCEMIA, PRIMARY HYPOPARATHYROIDISM  ADRENAL INSUFFICIENCY  Polyglandular autoimmune endocrine failure  Symptomatic hypocalcemia  Tingling, weakness  Calcium improved but plateaued, phosphorus elevated      RECOMMENDATIONS   Calcium gluconate 1 gm IV once now for symptomatic hypocalcemia  Increase calcitriol to 0.5 mcg BID  Calcium carbonate 1250 mg (500 mg elemental) 4 times daily.  Recommend oral calcium as the " maintenance with IV calcium for symptoms.  Follow serial calcium     Hydrocortisone at double her maintenance dose, 20 mg TID      Artemio Tay MD         [1] calcitriol, 0.25 mcg, oral, BID  calcium carbonate, 1,250 mg of calcium carbonate, oral, 4x daily  cholecalciferol, 125 mcg, oral, Daily  ferrous sulfate, 1 tablet, oral, Daily with breakfast  fludrocortisone, 0.1 mg, oral, Daily  hydrocortisone, 20 mg, oral, TID  pantoprazole, 40 mg, oral, Daily before breakfast  risperiDONE, 1 mg, oral, BID  [2]    [3] PRN medications: haloperidol lactate, melatonin

## 2025-07-02 LAB
ANION GAP SERPL CALC-SCNC: 15 MMOL/L (ref 10–20)
ATRIAL RATE: 56 BPM
ATRIAL RATE: 57 BPM
BUN SERPL-MCNC: 17 MG/DL (ref 6–23)
CALCIUM SERPL-MCNC: 6.5 MG/DL (ref 8.6–10.3)
CHLORIDE SERPL-SCNC: 104 MMOL/L (ref 98–107)
CO2 SERPL-SCNC: 25 MMOL/L (ref 21–32)
CREAT SERPL-MCNC: 0.65 MG/DL (ref 0.5–1.05)
EGFRCR SERPLBLD CKD-EPI 2021: >90 ML/MIN/1.73M*2
ERYTHROCYTE [DISTWIDTH] IN BLOOD BY AUTOMATED COUNT: 15.2 % (ref 11.5–14.5)
GLUCOSE SERPL-MCNC: 89 MG/DL (ref 74–99)
HCT VFR BLD AUTO: 32.3 % (ref 36–46)
HGB BLD-MCNC: 10.6 G/DL (ref 12–16)
MCH RBC QN AUTO: 29.2 PG (ref 26–34)
MCHC RBC AUTO-ENTMCNC: 32.8 G/DL (ref 32–36)
MCV RBC AUTO: 89 FL (ref 80–100)
NRBC BLD-RTO: 0 /100 WBCS (ref 0–0)
P AXIS: 26 DEGREES
P AXIS: 30 DEGREES
P OFFSET: 183 MS
P OFFSET: 188 MS
P ONSET: 152 MS
P ONSET: 154 MS
PLATELET # BLD AUTO: 216 X10*3/UL (ref 150–450)
POTASSIUM SERPL-SCNC: 3.6 MMOL/L (ref 3.5–5.3)
PR INTERVAL: 138 MS
PR INTERVAL: 140 MS
Q ONSET: 221 MS
Q ONSET: 224 MS
QRS COUNT: 10 BEATS
QRS COUNT: 9 BEATS
QRS DURATION: 72 MS
QRS DURATION: 72 MS
QT INTERVAL: 550 MS
QT INTERVAL: 552 MS
QTC CALCULATION(BAZETT): 532 MS
QTC CALCULATION(BAZETT): 535 MS
QTC FREDERICIA: 540 MS
QTC FREDERICIA: 540 MS
R AXIS: 56 DEGREES
R AXIS: 66 DEGREES
RBC # BLD AUTO: 3.63 X10*6/UL (ref 4–5.2)
SODIUM SERPL-SCNC: 140 MMOL/L (ref 136–145)
T AXIS: 127 DEGREES
T AXIS: 133 DEGREES
T OFFSET: 497 MS
T OFFSET: 499 MS
VENTRICULAR RATE: 56 BPM
VENTRICULAR RATE: 57 BPM
WBC # BLD AUTO: 11.7 X10*3/UL (ref 4.4–11.3)

## 2025-07-02 PROCEDURE — 85027 COMPLETE CBC AUTOMATED: CPT | Performed by: FAMILY MEDICINE

## 2025-07-02 PROCEDURE — 36415 COLL VENOUS BLD VENIPUNCTURE: CPT | Performed by: FAMILY MEDICINE

## 2025-07-02 PROCEDURE — 2500000001 HC RX 250 WO HCPCS SELF ADMINISTERED DRUGS (ALT 637 FOR MEDICARE OP): Performed by: INTERNAL MEDICINE

## 2025-07-02 PROCEDURE — 2500000002 HC RX 250 W HCPCS SELF ADMINISTERED DRUGS (ALT 637 FOR MEDICARE OP, ALT 636 FOR OP/ED)

## 2025-07-02 PROCEDURE — 2500000004 HC RX 250 GENERAL PHARMACY W/ HCPCS (ALT 636 FOR OP/ED): Mod: JZ | Performed by: INTERNAL MEDICINE

## 2025-07-02 PROCEDURE — 99231 SBSQ HOSP IP/OBS SF/LOW 25: CPT | Performed by: INTERNAL MEDICINE

## 2025-07-02 PROCEDURE — 99233 SBSQ HOSP IP/OBS HIGH 50: CPT

## 2025-07-02 PROCEDURE — 94760 N-INVAS EAR/PLS OXIMETRY 1: CPT

## 2025-07-02 PROCEDURE — 1200000002 HC GENERAL ROOM WITH TELEMETRY DAILY

## 2025-07-02 PROCEDURE — 2500000001 HC RX 250 WO HCPCS SELF ADMINISTERED DRUGS (ALT 637 FOR MEDICARE OP)

## 2025-07-02 PROCEDURE — 2500000001 HC RX 250 WO HCPCS SELF ADMINISTERED DRUGS (ALT 637 FOR MEDICARE OP): Performed by: HOSPITALIST

## 2025-07-02 PROCEDURE — 2500000001 HC RX 250 WO HCPCS SELF ADMINISTERED DRUGS (ALT 637 FOR MEDICARE OP): Performed by: STUDENT IN AN ORGANIZED HEALTH CARE EDUCATION/TRAINING PROGRAM

## 2025-07-02 PROCEDURE — 80048 BASIC METABOLIC PNL TOTAL CA: CPT | Performed by: FAMILY MEDICINE

## 2025-07-02 PROCEDURE — 2500000004 HC RX 250 GENERAL PHARMACY W/ HCPCS (ALT 636 FOR OP/ED)

## 2025-07-02 PROCEDURE — 2500000002 HC RX 250 W HCPCS SELF ADMINISTERED DRUGS (ALT 637 FOR MEDICARE OP, ALT 636 FOR OP/ED): Performed by: STUDENT IN AN ORGANIZED HEALTH CARE EDUCATION/TRAINING PROGRAM

## 2025-07-02 RX ORDER — HYDROCORTISONE 10 MG/1
10 TABLET ORAL NIGHTLY
Status: DISCONTINUED | OUTPATIENT
Start: 2025-07-02 | End: 2025-07-03 | Stop reason: HOSPADM

## 2025-07-02 RX ORDER — POTASSIUM CHLORIDE 20 MEQ/1
20 TABLET, EXTENDED RELEASE ORAL ONCE
Status: COMPLETED | OUTPATIENT
Start: 2025-07-02 | End: 2025-07-02

## 2025-07-02 RX ORDER — HYDROCORTISONE 10 MG/1
10 TABLET ORAL DAILY
Status: DISCONTINUED | OUTPATIENT
Start: 2025-07-03 | End: 2025-07-03 | Stop reason: HOSPADM

## 2025-07-02 RX ORDER — CALCIUM GLUCONATE 20 MG/ML
1 INJECTION, SOLUTION INTRAVENOUS EVERY 4 HOURS
Status: DISPENSED | OUTPATIENT
Start: 2025-07-02 | End: 2025-07-02

## 2025-07-02 RX ORDER — HYDROCORTISONE 10 MG/1
20 TABLET ORAL DAILY
Status: DISCONTINUED | OUTPATIENT
Start: 2025-07-03 | End: 2025-07-03 | Stop reason: HOSPADM

## 2025-07-02 RX ADMIN — RISPERIDONE 1 MG: 1 TABLET, FILM COATED ORAL at 22:11

## 2025-07-02 RX ADMIN — HALOPERIDOL LACTATE 5 MG: 5 INJECTION, SOLUTION INTRAMUSCULAR at 14:14

## 2025-07-02 RX ADMIN — CALCITRIOL CAPSULES 0.25 MCG 0.5 MCG: 0.25 CAPSULE ORAL at 22:12

## 2025-07-02 RX ADMIN — Medication 125 MCG: at 09:15

## 2025-07-02 RX ADMIN — RISPERIDONE 1 MG: 1 TABLET, FILM COATED ORAL at 09:15

## 2025-07-02 RX ADMIN — POTASSIUM CHLORIDE 20 MEQ: 1500 TABLET, EXTENDED RELEASE ORAL at 11:06

## 2025-07-02 RX ADMIN — CALCITRIOL CAPSULES 0.25 MCG 0.5 MCG: 0.25 CAPSULE ORAL at 09:16

## 2025-07-02 RX ADMIN — FERROUS SULFATE TAB 325 MG (65 MG ELEMENTAL FE) 1 TABLET: 325 (65 FE) TAB at 09:15

## 2025-07-02 RX ADMIN — CALCIUM 1 TABLET: 500 TABLET ORAL at 13:01

## 2025-07-02 RX ADMIN — FLUDROCORTISONE ACETATE 0.1 MG: 0.1 TABLET ORAL at 09:16

## 2025-07-02 RX ADMIN — HYDROCORTISONE 20 MG: 10 TABLET ORAL at 14:15

## 2025-07-02 RX ADMIN — HYDROCORTISONE 20 MG: 10 TABLET ORAL at 09:15

## 2025-07-02 RX ADMIN — CALCIUM GLUCONATE 1 G: 20 INJECTION, SOLUTION INTRAVENOUS at 21:13

## 2025-07-02 RX ADMIN — CALCIUM 1 TABLET: 500 TABLET ORAL at 05:22

## 2025-07-02 RX ADMIN — HYDROCORTISONE 10 MG: 10 TABLET ORAL at 22:11

## 2025-07-02 ASSESSMENT — COGNITIVE AND FUNCTIONAL STATUS - GENERAL
MOBILITY SCORE: 24
DAILY ACTIVITIY SCORE: 24

## 2025-07-02 ASSESSMENT — PAIN SCALES - GENERAL
PAINLEVEL_OUTOF10: 0 - NO PAIN
PAINLEVEL_OUTOF10: 0 - NO PAIN

## 2025-07-02 ASSESSMENT — PAIN - FUNCTIONAL ASSESSMENT
PAIN_FUNCTIONAL_ASSESSMENT: 0-10
PAIN_FUNCTIONAL_ASSESSMENT: 0-10

## 2025-07-02 NOTE — PROGRESS NOTES
"Kriss Malloy is a 49 y.o. female on day 4 of admission presenting with Hypocalcemia.    Subjective   Feeling better, walking the [place]  Less weak  She is still concerned she wants IV calcium fluids    Scheduled medications  Scheduled Medications[1]  Continuous medications  Continuous Medications[2]  PRN medications  PRN Medications[3]        Objective   Physical Exam  Constitutional:       General: She is not in acute distress.  Neurological:      Mental Status: She is alert.         Last Recorded Vitals  Blood pressure 118/71, pulse 89, temperature 37 °C (98.6 °F), temperature source Temporal, resp. rate 18, height 1.689 m (5' 6.5\"), weight 55.2 kg (121 lb 11.1 oz), SpO2 99%.  Intake/Output last 3 Shifts:  I/O last 3 completed shifts:  In: 670 (12.1 mL/kg) [P.O.:620; IV Piggyback:50]  Out: - (0 mL/kg)   Weight: 55.2 kg     Relevant Results   Latest Reference Range & Units 07/02/25 05:44   GLUCOSE 74 - 99 mg/dL 89   SODIUM 136 - 145 mmol/L 140   POTASSIUM 3.5 - 5.3 mmol/L 3.6   CHLORIDE 98 - 107 mmol/L 104   Bicarbonate 21 - 32 mmol/L 25   Anion Gap 10 - 20 mmol/L 15   Blood Urea Nitrogen 6 - 23 mg/dL 17   Creatinine 0.50 - 1.05 mg/dL 0.65   EGFR >60 mL/min/1.73m*2 >90   Calcium 8.6 - 10.3 mg/dL 6.5 (L)         Assessment & Plan  Hypocalcemia    Undifferentiated schizophrenia (Multi)    HYPOCALCEMIA, PRIMARY HYPOPARATHYROIDISM  ADRENAL INSUFFICIENCY  Polyglandular autoimmune endocrine failure  Symptomatic hypocalcemia, improved  Calcium improved      RECOMMENDATIONS   Calcium gluconate 1 gm IV once now for symptomatic hypocalcemia  Increase calcitriol to 0.5 mcg BID  Calcium carbonate 1250 mg (500 mg elemental) 4 times daily.  Recommend oral calcium as the maintenance with IV calcium for symptoms.  Follow serial calcium, magnesium, phosphorus.  Repeat Albumin.   Measured calcium >7 mg/dl if asymptomatic, should be adequate for discharge, given her chronic hypocalcemia     Hydrocortisone 20 mg QAM, 10 mg noon, " 10 mg QPM  Decrease Hydrocortisone at discharge to 15 mg QAM, 10 mg afternoon and 5 mg PM     There will be no endocrinology coverage in house 7/3/25-7/6/25.   Please call if any questions.    Artemio Tay MD         [1] calcitriol, 0.5 mcg, oral, BID  calcium carbonate, 1,250 mg of calcium carbonate, oral, 4x daily  cholecalciferol, 125 mcg, oral, Daily  ferrous sulfate, 1 tablet, oral, Daily with breakfast  fludrocortisone, 0.1 mg, oral, Daily  hydrocortisone, 20 mg, oral, TID  pantoprazole, 40 mg, oral, Daily before breakfast  risperiDONE, 1 mg, oral, BID  sertraline, 25 mg, oral, Daily  [2]    [3] PRN medications: haloperidol lactate, melatonin

## 2025-07-02 NOTE — PROGRESS NOTES
07/02/25 1534   Discharge Planning   Living Arrangements Other (Comment)  (arrived from generations in [place], prior was home with sister)   Support Systems Family members   Assistance Needed Pt usually lives in [place] with her sister.  Called to sister re: discharge planning.  Sister states pt refuses medications, hallucinates and talks to herself at home.  She states pt's day consists of walking around, eating, visiting family etc.  Pt use to drive, but does not anymore.  Pt does not see community providers.  Sister states she is ok with pt coming home as long as pt has her Melchor's medication and something like Zoloft for her anxiety. Pt has not been on Zoloft, but sister states she thinks pt has anxiety.  Explained psychiatrist is recommending guardianship, but sister states she is unable to do this and there is no one else to do it as well.   Type of Residence Inpatient behavioral health unit/facility   Do you have animals or pets at home? No   Who is requesting discharge planning? Provider   Expected Discharge Disposition Psych  (TBD: patient pacing halls, talking to herself, noncompliant with meds, sitter, psych says no need for IP psych, Sister doesn't want to pursue guardianship.  will send psych note to [facility] when medically ready to see if they will accept back.)   Does the patient need discharge transport arranged? Yes   [facility] coordination needed? Yes   Has discharge transport been arranged? No   Stroke Family Assessment   Stroke Family Assessment Needed No   Intensity of Service   Intensity of Service 0-30 min

## 2025-07-02 NOTE — PROGRESS NOTES
"Kriss Malloy is a 49 y.o. female on day 4 of admission presenting with Hypocalcemia.    Subjective   Resting in bed during her examination, continues to take to herself. A&O x 2 to person and place. Answers question appropriately. Continues to have tingling, states her weakness has improved. Continues to stroll the hallway after examination. Denies SI/HI, denies hallucinations  Adamant about being transferred to [facility] to be closer to her home.          Objective     Physical Exam  Constitutional:       General: She is not in acute distress.     Appearance: Normal appearance.   HENT:      Head: Normocephalic and atraumatic.      Nose: Nose normal.      Mouth/Throat:      Mouth: Mucous membranes are moist.   Eyes:      Extraocular Movements: Extraocular movements intact.      Conjunctiva/sclera: Conjunctivae normal.   Cardiovascular:      Rate and Rhythm: Normal rate and regular rhythm.      Heart sounds: Normal heart sounds.   Pulmonary:      Effort: Pulmonary effort is normal. No respiratory distress.      Breath sounds: Normal breath sounds.   Abdominal:      General: Abdomen is flat. Bowel sounds are normal.      Palpations: Abdomen is soft.   Skin:     General: Skin is warm.   Neurological:      General: No focal deficit present.      Mental Status: She is alert.      Motor: Weakness present.      Comments: A&O x 2 to person and place   Psychiatric:         Attention and Perception: She perceives auditory hallucinations.         Behavior: Behavior is cooperative.         Thought Content: Thought content does not include homicidal or suicidal ideation.      Comments: Appears to be externally stimulated         Last Recorded Vitals  Blood pressure 123/81, pulse 91, temperature 36.8 °C (98.2 °F), temperature source Temporal, resp. rate 18, height 1.689 m (5' 6.5\"), weight 55.2 kg (121 lb 11.1 oz), SpO2 99%.  Intake/Output last 3 Shifts:  I/O last 3 completed shifts:  In: 670 (12.1 mL/kg) [P.O.:620; IV " Piggyback:50]  Out: - (0 mL/kg)   Weight: 55.2 kg     Relevant Results    Scheduled medications  Scheduled Medications[1]  Continuous medications  Continuous Medications[2]  PRN medications  PRN Medications[3]    Results for orders placed or performed during the hospital encounter of 06/28/25 (from the past 24 hours)   Basic Metabolic Panel   Result Value Ref Range    Glucose 89 74 - 99 mg/dL    Sodium 140 136 - 145 mmol/L    Potassium 3.6 3.5 - 5.3 mmol/L    Chloride 104 98 - 107 mmol/L    Bicarbonate 25 21 - 32 mmol/L    Anion Gap 15 10 - 20 mmol/L    Urea Nitrogen 17 6 - 23 mg/dL    Creatinine 0.65 0.50 - 1.05 mg/dL    eGFR >90 >60 mL/min/1.73m*2    Calcium 6.5 (L) 8.6 - 10.3 mg/dL   CBC   Result Value Ref Range    WBC 11.7 (H) 4.4 - 11.3 x10*3/uL    nRBC 0.0 0.0 - 0.0 /100 WBCs    RBC 3.63 (L) 4.00 - 5.20 x10*6/uL    Hemoglobin 10.6 (L) 12.0 - 16.0 g/dL    Hematocrit 32.3 (L) 36.0 - 46.0 %    MCV 89 80 - 100 fL    MCH 29.2 26.0 - 34.0 pg    MCHC 32.8 32.0 - 36.0 g/dL    RDW 15.2 (H) 11.5 - 14.5 %    Platelets 216 150 - 450 x10*3/uL       ECG 12 lead  Result Date: 6/30/2025  Sinus bradycardia Low voltage QRS T wave abnormality, consider lateral ischemia Prolonged QT Abnormal ECG When compared with ECG of 28-JUN-2025 07:45, (unconfirmed) No significant change was found    ECG 12 lead  Result Date: 6/30/2025  Normal sinus rhythm T wave abnormality, consider lateral ischemia Abnormal ECG When compared with ECG of 28-JUN-2025 04:14, (unconfirmed) No significant change was found    ECG 12 lead  Result Date: 6/30/2025  Normal sinus rhythm T wave abnormality, consider lateral ischemia Prolonged QT Abnormal ECG When compared with ECG of 28-JUN-2025 05:18, (unconfirmed) No significant change was found    ECG 12 lead  Result Date: 6/30/2025  Normal sinus rhythm Nonspecific T wave abnormality Prolonged QT Abnormal ECG When compared with ECG of 27-MAY-2025 22:07, T wave inversion no longer evident in Inferior leads    CT  angio chest for pulmonary embolism  Result Date: 6/28/2025  STUDY: CT Angiogram of the Chest; 6/28/2025 8:16 INDICATION: Shortness of breath, elevated D-dimer. COMPARISON: 2/22/2025 XR Chest, 6/14/2023 CTA Chest. ACCESSION NUMBER(S): [id number] ORDERING CLINICIAN: ENMANUEL RIVAS TECHNIQUE:  CTA of the chest was performed with intravenous contrast. Images are reviewed and processed at a workstation according to the CT angiogram protocol with 3-D and/or MIP post processing imaging generated.  Omnipaque 350 75 mL was administered intravenously.  Automated mA/kV exposure control was utilized and patient examination was performed in strict accordance with principles of ALARA. FINDINGS: Pulmonary arteries are adequately opacified without acute or chronic filling defects.  The thoracic aorta is normal in course and caliber without dissection or aneurysm. The heart is normal in size without pericardial effusion.  Thoracic lymph nodes are not enlarged. There is no pleural effusion, pleural thickening, or pneumothorax. The airways are patent. Biapical pleural-parenchymal scarring.  Mild mosaic perfusion. Upper abdomen demonstrates no acute pathology. There are no acute fractures.  No suspicious bony lesions.    1.No pulmonary embolism or other acute intrathoracic process. 2.Mild mosaic perfusion suggestive of air trapping or small airways disease. Signed by Lai Ordaz MD    XR chest 1 view  Result Date: 6/28/2025  Interpreted By:  Finkelstein, Evan, STUDY: XR CHEST 1 VIEW;  6/28/2025 5:26 am   INDICATION: Signs/Symptoms:Chest Pain.     COMPARISON: None.   ACCESSION NUMBER(S): [id number]   ORDERING CLINICIAN: LOLA BARTLETT   FINDINGS:     CARDIOMEDIASTINAL SILHOUETTE: Cardiomediastinal silhouette is normal in size and configuration.   LUNGS: No pulmonary consolidation, pleural effusion or pneumothorax.   ABDOMEN: No remarkable upper abdominal findings.   BONES: No acute osseous abnormality.       No radiographic  evidence of acute cardiopulmonary pathology.   MACRO: None.   Signed by: Evan Finkelstein 6/28/2025 5:28 AM Dictation workstation:   [id number]    XR chest 1 view  Result Date: 6/11/2025  * * *Final Report* * * DATE OF EXAM: Jun 11 2025  8:20AM   RHX   [id number]  -  XR CHEST 1V FRONTAL PORT  / ACCESSION #  [id number] PROCEDURE REASON: Chest pain, nonspecific      * * * * Physician Interpretation * * * *  EXAMINATION:  CHEST RADIOGRAPH (PORTABLE SINGLE VIEW AP) Exam Date/Time:  6/11/2025 8:20 AM CLINICAL HISTORY: Chest pain, nonspecific, Other, Hypotension MQ:  XCPR_5 Comparison:  None RESULT: Lines, tubes, and devices:  None. Lungs and pleura:  No consolidation, pleural effusion, pneumothorax, pulmonary nodules, or pulmonary edema Cardiomediastinal silhouette:  Stable cardiomediastinal silhouette. Other:  The osseous structures are unremarkable    IMPRESSION: No acute cardiopulmonary disease : SUZANNE   Transcribe Date/Time: Jun 11 2025  8:36A Dictated by : KARYN WREN MD This examination was interpreted and the report reviewed and electronically signed by: KARYN WREN MD on Jun 11 2025  8:37AM  EST           Assessment & Plan      Kriss Malloy is a 49 year old female with PMH of [place]'s disease with hypocalcemia, hypothyroidism, and paranoid schizophrenia with schizoaffective disorder, admitted for hypocalcemia.    #Melchor's disease with hypocalcemia  -continues to complain of tingling but improved weakness   -endo following   -s/p 1 dose Calcium gluconate 1 gm IV for symptomatic hypocalcemia on 7/1   -will give 1 dose of calcium gluconate 1g IV again for symptomatic hypocalcemia  -taper hydrocortisone to 20 mg QAM, 10 mg noon, 10 mg QPM  -continue calcitriol 0.5 mcg BID  -Calcium carbonate 1250 mg (500 mg elemental) 4 times daily   -continue florinef 0.1 mg once daily   -Goal Calcium >7  -Discharge hydrocortisone regimen: 15 mg QAM, 10 mg afternoon and 5 mg PM   -will follow serial  calcium, magnesium, phosphorus  -repeat albumin      Paranoid schizophrenia   - continue risperidone 1 mg BID   - started zoloft 25 mg daily  per patient's sister's request for potential anxiety (patient declining)   - psych consulted, despite constant auditory hallucinations, appears this is chronic for patient and therefore does not meet inpt criteria            [1] calcitriol, 0.5 mcg, oral, BID  calcium carbonate, 1,250 mg of calcium carbonate, oral, 4x daily  cholecalciferol, 125 mcg, oral, Daily  ferrous sulfate, 1 tablet, oral, Daily with breakfast  fludrocortisone, 0.1 mg, oral, Daily  hydrocortisone, 20 mg, oral, TID  pantoprazole, 40 mg, oral, Daily before breakfast  risperiDONE, 1 mg, oral, BID  sertraline, 25 mg, oral, Daily  [2]    [3] PRN medications: haloperidol lactate, melatonin

## 2025-07-02 NOTE — CARE PLAN
The patient's goals for the shift include  Unable to verbalize a coherent goal     The clinical goals for the shift include pt will remain free of injury

## 2025-07-03 VITALS
HEIGHT: 67 IN | DIASTOLIC BLOOD PRESSURE: 63 MMHG | WEIGHT: 121.69 LBS | SYSTOLIC BLOOD PRESSURE: 128 MMHG | BODY MASS INDEX: 19.1 KG/M2 | TEMPERATURE: 98.7 F | RESPIRATION RATE: 12 BRPM | HEART RATE: 52 BPM | OXYGEN SATURATION: 98 %

## 2025-07-03 LAB
ALBUMIN SERPL BCP-MCNC: 3.1 G/DL (ref 3.4–5)
ALP SERPL-CCNC: 72 U/L (ref 33–110)
ALT SERPL W P-5'-P-CCNC: 38 U/L (ref 7–45)
ANION GAP SERPL CALC-SCNC: 12 MMOL/L (ref 10–20)
AST SERPL W P-5'-P-CCNC: 23 U/L (ref 9–39)
BILIRUB SERPL-MCNC: 0.4 MG/DL (ref 0–1.2)
BUN SERPL-MCNC: 20 MG/DL (ref 6–23)
CALCIUM SERPL-MCNC: 7.3 MG/DL (ref 8.6–10.3)
CHLORIDE SERPL-SCNC: 105 MMOL/L (ref 98–107)
CO2 SERPL-SCNC: 27 MMOL/L (ref 21–32)
CREAT SERPL-MCNC: 0.74 MG/DL (ref 0.5–1.05)
EGFRCR SERPLBLD CKD-EPI 2021: >90 ML/MIN/1.73M*2
ERYTHROCYTE [DISTWIDTH] IN BLOOD BY AUTOMATED COUNT: 15.5 % (ref 11.5–14.5)
GLUCOSE SERPL-MCNC: 81 MG/DL (ref 74–99)
HCT VFR BLD AUTO: 29.6 % (ref 36–46)
HGB BLD-MCNC: 9.7 G/DL (ref 12–16)
MAGNESIUM SERPL-MCNC: 1.35 MG/DL (ref 1.6–2.4)
MCH RBC QN AUTO: 29.3 PG (ref 26–34)
MCHC RBC AUTO-ENTMCNC: 32.8 G/DL (ref 32–36)
MCV RBC AUTO: 89 FL (ref 80–100)
NRBC BLD-RTO: 0 /100 WBCS (ref 0–0)
PHOSPHATE SERPL-MCNC: 5.9 MG/DL (ref 2.5–4.9)
PLATELET # BLD AUTO: 188 X10*3/UL (ref 150–450)
POTASSIUM SERPL-SCNC: 3.3 MMOL/L (ref 3.5–5.3)
PROT SERPL-MCNC: 5.2 G/DL (ref 6.4–8.2)
RBC # BLD AUTO: 3.31 X10*6/UL (ref 4–5.2)
SODIUM SERPL-SCNC: 141 MMOL/L (ref 136–145)
WBC # BLD AUTO: 10.2 X10*3/UL (ref 4.4–11.3)

## 2025-07-03 PROCEDURE — 85027 COMPLETE CBC AUTOMATED: CPT | Performed by: FAMILY MEDICINE

## 2025-07-03 PROCEDURE — 82435 ASSAY OF BLOOD CHLORIDE: CPT

## 2025-07-03 PROCEDURE — 84100 ASSAY OF PHOSPHORUS: CPT

## 2025-07-03 PROCEDURE — 2500000001 HC RX 250 WO HCPCS SELF ADMINISTERED DRUGS (ALT 637 FOR MEDICARE OP)

## 2025-07-03 PROCEDURE — 2500000001 HC RX 250 WO HCPCS SELF ADMINISTERED DRUGS (ALT 637 FOR MEDICARE OP): Performed by: STUDENT IN AN ORGANIZED HEALTH CARE EDUCATION/TRAINING PROGRAM

## 2025-07-03 PROCEDURE — 2500000001 HC RX 250 WO HCPCS SELF ADMINISTERED DRUGS (ALT 637 FOR MEDICARE OP): Performed by: HOSPITALIST

## 2025-07-03 PROCEDURE — 99231 SBSQ HOSP IP/OBS SF/LOW 25: CPT | Performed by: PSYCHIATRY & NEUROLOGY

## 2025-07-03 PROCEDURE — 36415 COLL VENOUS BLD VENIPUNCTURE: CPT | Performed by: FAMILY MEDICINE

## 2025-07-03 PROCEDURE — 83735 ASSAY OF MAGNESIUM: CPT

## 2025-07-03 PROCEDURE — 2500000001 HC RX 250 WO HCPCS SELF ADMINISTERED DRUGS (ALT 637 FOR MEDICARE OP): Performed by: INTERNAL MEDICINE

## 2025-07-03 PROCEDURE — 99239 HOSP IP/OBS DSCHRG MGMT >30: CPT

## 2025-07-03 PROCEDURE — 2500000004 HC RX 250 GENERAL PHARMACY W/ HCPCS (ALT 636 FOR OP/ED)

## 2025-07-03 RX ORDER — SERTRALINE HYDROCHLORIDE 25 MG/1
25 TABLET, FILM COATED ORAL DAILY
Qty: 30 TABLET | Refills: 0 | Status: SHIPPED | OUTPATIENT
Start: 2025-07-04

## 2025-07-03 RX ORDER — HYDROCORTISONE 5 MG/1
15 TABLET ORAL EVERY MORNING
Start: 2025-07-03

## 2025-07-03 RX ORDER — LANOLIN ALCOHOL/MO/W.PET/CERES
400 CREAM (GRAM) TOPICAL DAILY
Status: DISCONTINUED | OUTPATIENT
Start: 2025-07-03 | End: 2025-07-03 | Stop reason: HOSPADM

## 2025-07-03 RX ORDER — OMEPRAZOLE 20 MG/1
20 CAPSULE, DELAYED RELEASE ORAL DAILY
Start: 2025-07-03

## 2025-07-03 RX ORDER — CALCIUM CARBONATE 500(1250)
1 TABLET ORAL 4 TIMES DAILY
Qty: 120 TABLET | Refills: 0 | Status: SHIPPED | OUTPATIENT
Start: 2025-07-03

## 2025-07-03 RX ORDER — POTASSIUM CHLORIDE 20 MEQ/1
40 TABLET, EXTENDED RELEASE ORAL ONCE
Status: DISCONTINUED | OUTPATIENT
Start: 2025-07-03 | End: 2025-07-03 | Stop reason: HOSPADM

## 2025-07-03 RX ORDER — MAGNESIUM SULFATE HEPTAHYDRATE 40 MG/ML
2 INJECTION, SOLUTION INTRAVENOUS ONCE
Status: DISCONTINUED | OUTPATIENT
Start: 2025-07-03 | End: 2025-07-03

## 2025-07-03 RX ORDER — HYDROCORTISONE 5 MG/1
5 TABLET ORAL NIGHTLY
Start: 2025-07-03

## 2025-07-03 RX ORDER — HYDROCORTISONE 10 MG/1
10 TABLET ORAL DAILY
Qty: 30 TABLET | Refills: 0 | Status: SHIPPED | OUTPATIENT
Start: 2025-07-04

## 2025-07-03 RX ORDER — RISPERIDONE 1 MG/1
1 TABLET ORAL 2 TIMES DAILY
Start: 2025-07-03

## 2025-07-03 RX ORDER — LANOLIN ALCOHOL/MO/W.PET/CERES
1 CREAM (GRAM) TOPICAL DAILY
Start: 2025-07-03

## 2025-07-03 RX ADMIN — Medication 125 MCG: at 09:46

## 2025-07-03 RX ADMIN — FLUDROCORTISONE ACETATE 0.1 MG: 0.1 TABLET ORAL at 09:48

## 2025-07-03 RX ADMIN — CALCIUM 1 TABLET: 500 TABLET ORAL at 00:14

## 2025-07-03 RX ADMIN — CALCIUM 1 TABLET: 500 TABLET ORAL at 06:00

## 2025-07-03 RX ADMIN — HYDROCORTISONE 20 MG: 10 TABLET ORAL at 09:48

## 2025-07-03 RX ADMIN — FERROUS SULFATE TAB 325 MG (65 MG ELEMENTAL FE) 1 TABLET: 325 (65 FE) TAB at 09:52

## 2025-07-03 RX ADMIN — Medication 1 TABLET: at 09:46

## 2025-07-03 RX ADMIN — CALCIUM 1 TABLET: 500 TABLET ORAL at 13:18

## 2025-07-03 RX ADMIN — CALCITRIOL CAPSULES 0.25 MCG 0.5 MCG: 0.25 CAPSULE ORAL at 09:47

## 2025-07-03 RX ADMIN — HYDROCORTISONE 10 MG: 10 TABLET ORAL at 13:18

## 2025-07-03 ASSESSMENT — COGNITIVE AND FUNCTIONAL STATUS - GENERAL
MOBILITY SCORE: 24
DAILY ACTIVITIY SCORE: 24

## 2025-07-03 ASSESSMENT — PAIN - FUNCTIONAL ASSESSMENT: PAIN_FUNCTIONAL_ASSESSMENT: 0-10

## 2025-07-03 ASSESSMENT — PAIN SCALES - GENERAL: PAINLEVEL_OUTOF10: 0 - NO PAIN

## 2025-07-03 NOTE — PROGRESS NOTES
"Kriss Malloy is a 49 y.o. female on day 5 of admission presenting with Hypocalcemia.      Subjective   The patient was seen and examined. I reviewed the chart and vital signs from overnight, completed labs. I reviewed previous notes. I reviewed medications, administered overnight and their reported benefits or side effects. Per chart has been taking her risperidone, but refusing zoloft.  I said good morning, her first response, \" I am not mentally ill I dont have schizophrenia\". I asked how she was doing, she responded, \" I am not mentally ill and I am not taking risperidone\". She was benjamin, dismissive declined to answer mental health assessment question    Per nursing note, resident notes, talking to herself, actively hallucinating.     Has been taking her  medical medications                         Objective     Last Recorded Vitals  Blood pressure 128/63, pulse 52, temperature 36.9 °C (98.5 °F), temperature source Temporal, resp. rate 12, height 1.689 m (5' 6.5\"), weight 55.2 kg (121 lb 11.1 oz), SpO2 98%.    Sleep Log  No Safety Checks orders active in given range     Review of Systems    Psychiatric ROS - Adult  Anxiety: Negative  Depression: negative  Delirium: negative  Psychosis: auditory hallucinations  Sherrie: negative  Safety Issues: refusal mental health care at home, needs a guardian  Psychiatric ROS Comment: na    Physical Exam      Mental Status Exam  General: female with endocrine disorder, chronic untreated hallucinations, likely chronic schizophrenia untreated, paranoid type  Appearance: frail, gown, short hair, laying on her side, avoiding eye contact  Attitude: dismissive  Behavior: repetitive comments  Motor Activity: No eps, noted TD in both upper arms, no psychomotor retardation or agitation, gait stable, walks unassisted   Speech:  pressured, clear, normal tone, volume, fluent, spontaneous  Mood: irritable  Affect: paranoid  Thought Process: Linear, organized, goal directed  Thought " Content: refusal of taking risperidone or mental health meds on dc, stops all meds medical and mental health at her, per family, does not believe she is schizophrenia  Thought Perception: actively hallucinating, having full conversations with herself  Cognition: Alert and oriented to person, place, time, purpose, short and long term memory are grossly intact, language is grossly intact  Insight: poor  Judgement: poor    Psychiatric Risk Assessment  Violence Risk Assessment: lower socioeconomic class, major mental illness, and unemployment  Acute Risk of Harm to Others is Considered: low   Suicide Risk Assessment: medical illness, chronic mental health issues  Protective Factors against Suicide: positive family relations  Acute Risk of Harm to Self is Considered: low    Relevant Results               Assessment & Plan  Hypocalcemia    Undifferentiated schizophrenia (Multi)  On chart review  Dc seroquel due to qtc  Did take risperidone so far  Pharmacy consult if needed  Please reach out to family, regarding dc planning     Recommend increasing risperidone, as was done  If agitated give haldol prn    I recommended to the medical team, to reach out to family for guardianship, sister to contact [facility]ate court, to request patient be placed on public guardian list, patient at high risk for readmissions, mental health med non compliance on discharge, per family, there is extensive documentation I the chart, last note per [facility]     Patient was recently at [facility], team will send referral today to [facility] for inpatient psychiatry for acute stabilization. Sister given recommendations for guardianship.               I spent 25 minutes in the professional and overall care of this patient.      Maria D Ybarra MD

## 2025-07-03 NOTE — DISCHARGE SUMMARY
Discharge Diagnosis  Hypocalcemia    Issues Requiring Follow-Up  Hypocalcemia  Hypomagnesemia   [place]     Test Results Pending At Discharge  Pending Labs       No current pending labs.            Hospital Course   Kriss Malloy is a 49 year old female with PMH of [place] disease with hypocalcemia, hypothyroidism, and paranoid schizophrenia with schizoaffective disorder, admitted for hypocalcemia of 5.5.  Patient complains of symptoms of full body tingling and weakness which improved over the course of her hospital stay.  Patient was treated with oral and IV calcium supplementation in addition to increased steroid dosing.  Patient was initially treated with twice her maintenance hydrocortisone, at hydrocortisone 20 mg 3 times daily, she was then tapered and will be discharged on hydrocortisone regimen: 15 mg QAM, 10 mg afternoon and 5 mg PM.  Patient's calcium, potassium, magnesium and phosphorus were trended during hospital stay and repleted accordingly.  In addition she was started on Zoloft 25 mg once daily due to anxiety as discussed with her sister, however she repeatedly did decline the medication.  Her symptoms of tingling i and weakness mproved along with her hypocalcemia to baseline >7.  Patient was medically stable to discharge.  [facility]          Visit Vitals  /63 (BP Location: Left arm, Patient Position: Lying)   Pulse 52   Temp 37.1 °C (98.7 °F) (Temporal)   Resp 12     Vitals:    06/28/25 1415   Weight: 55.2 kg (121 lb 11.1 oz)         There is no immunization history on file for this patient.    Results  Scheduled medications  Scheduled Medications[1]  Continuous medications  Continuous Medications[2]  PRN medications  PRN Medications[3]    Results for orders placed or performed during the hospital encounter of 06/28/25 (from the past 24 hours)   CBC   Result Value Ref Range    WBC 10.2 4.4 - 11.3 x10*3/uL    nRBC 0.0 0.0 - 0.0 /100 WBCs     RBC 3.31 (L) 4.00 - 5.20 x10*6/uL    Hemoglobin 9.7 (L) 12.0 - 16.0 g/dL    Hematocrit 29.6 (L) 36.0 - 46.0 %    MCV 89 80 - 100 fL    MCH 29.3 26.0 - 34.0 pg    MCHC 32.8 32.0 - 36.0 g/dL    RDW 15.5 (H) 11.5 - 14.5 %    Platelets 188 150 - 450 x10*3/uL   Phosphorus   Result Value Ref Range    Phosphorus 5.9 (H) 2.5 - 4.9 mg/dL   Magnesium   Result Value Ref Range    Magnesium 1.35 (L) 1.60 - 2.40 mg/dL   Comprehensive metabolic panel   Result Value Ref Range    Glucose 81 74 - 99 mg/dL    Sodium 141 136 - 145 mmol/L    Potassium 3.3 (L) 3.5 - 5.3 mmol/L    Chloride 105 98 - 107 mmol/L    Bicarbonate 27 21 - 32 mmol/L    Anion Gap 12 10 - 20 mmol/L    Urea Nitrogen 20 6 - 23 mg/dL    Creatinine 0.74 0.50 - 1.05 mg/dL    eGFR >90 >60 mL/min/1.73m*2    Calcium 7.3 (L) 8.6 - 10.3 mg/dL    Albumin 3.1 (L) 3.4 - 5.0 g/dL    Alkaline Phosphatase 72 33 - 110 U/L    Total Protein 5.2 (L) 6.4 - 8.2 g/dL    AST 23 9 - 39 U/L    Bilirubin, Total 0.4 0.0 - 1.2 mg/dL    ALT 38 7 - 45 U/L         Pertinent Physical Exam At Time of Discharge  Physical Exam  Constitutional:       Appearance: Normal appearance.   HENT:      Head: Normocephalic and atraumatic.      Nose: Nose normal.   Eyes:      Extraocular Movements: Extraocular movements intact.      Conjunctiva/sclera: Conjunctivae normal.   Cardiovascular:      Rate and Rhythm: Normal rate and regular rhythm.      Heart sounds: Normal heart sounds.   Pulmonary:      Effort: Pulmonary effort is normal.      Breath sounds: Normal breath sounds.   Abdominal:      General: Abdomen is flat. Bowel sounds are normal.      Palpations: Abdomen is soft.   Musculoskeletal:         General: Normal range of motion.      Comments: Ambulating in hallway   Skin:     General: Skin is warm.   Neurological:      Mental Status: She is alert.   Psychiatric:         Attention and Perception: She perceives auditory hallucinations.         Behavior: Behavior is cooperative.         Thought Content:  Thought content does not include homicidal or suicidal ideation.      Comments: Externally stimulated         Home Medications     Medication List      START taking these medications     calcium carbonate 1,250 mg (500 mg elemental) tablet; Commonly known as:   Os-Ruben; Take 1 tablet by mouth 4 times a day.   sertraline 25 mg tablet; Commonly known as: Zoloft; Take 1 tablet (25   mg) by mouth once daily.; Start taking on: July 4, 2025     CHANGE how you take these medications     fludrocortisone 0.1 mg tablet; Commonly known as: Florinef; Take 1   tablet (0.1 mg) by mouth once daily.; What changed: Another medication   with the same name was removed. Continue taking this medication, and   follow the directions you see here.   * hydrocortisone 5 mg tablet; Commonly known as: Cortef; Take 1 tablet   (5 mg) by mouth once daily at bedtime.; What changed: Another medication   with the same name was changed. Make sure you understand how and when to   take each., Another medication with the same name was removed. Continue   taking this medication, and follow the directions you see here.   * hydrocortisone 5 mg tablet; Commonly known as: Cortef; Take 3 tablets   (15 mg) by mouth once daily in the morning.; What changed: medication   strength, Another medication with the same name was removed. Continue   taking this medication, and follow the directions you see here.   * hydrocortisone 10 mg tablet; Commonly known as: Cortef; Take 1 tablet   (10 mg) by mouth once daily. In the afternoon.; Start taking on: July 4, 2025; What changed: how much to take, when to take this, additional   instructions, Another medication with the same name was removed. Continue   taking this medication, and follow the directions you see here.   risperiDONE 1 mg tablet; Commonly known as: RisperDAL; Take 1 tablet (1   mg) by mouth 2 times a day.; What changed: medication strength, how much   to take  * This list has 3 medication(s) that are the  same as other medications   prescribed for you. Read the directions carefully, and ask your doctor or   other care provider to review them with you.     CONTINUE taking these medications     acetaminophen 325 mg tablet; Commonly known as: Tylenol; Take 2 tablets   (650 mg) by mouth every 4 hours if needed for mild pain (1 - 3), fever   (temp greater than 38.0 C) or headaches.   * calcitriol 0.25 mcg capsule; Commonly known as: Rocaltrol; Take 1   capsule (0.25 mcg) by mouth once daily.   * calcitriol 0.25 mcg capsule; Commonly known as: Rocaltrol; Take 1   capsule (0.25 mcg) by mouth once daily.   docusate sodium 100 mg capsule; Commonly known as: Colace; Take 1   capsule (100 mg) by mouth 2 times a day as needed for constipation.   ferrous sulfate 325 mg (65 mg elemental) tablet   haloperidol 5 mg tablet; Commonly known as: Haldol   hydrOXYzine pamoate 25 mg capsule; Commonly known as: Vistaril   magnesium oxide 400 mg (241.3 mg elemental) tablet; Commonly known as:   Mag-Ox; Take 1 tablet by mouth once daily.   melatonin 5 mg tablet; Take 1 tablet (5 mg) by mouth as needed at   bedtime for sleep.   omeprazole 20 mg DR capsule; Commonly known as: PriLOSEC; Take 1 capsule   (20 mg) by mouth once daily.   Oysco 500/D 500 mg-5 mcg (200 unit) tablet; Generic drug: calcium   carbonate-vitamin D3   paliperidone 3 mg 24 hr tablet; Commonly known as: Invega   potassium chloride ER 10 mEq ER capsule; Commonly known as: Micro-K   QUEtiapine 25 mg tablet; Commonly known as: SEROquel; Take 1 tablet (25   mg) by mouth once daily at bedtime.   Vitamin D3 125 mcg (5,000 unit) tablet; Generic drug: cholecalciferol;   Take 1 tablet (5,000 Units) by mouth once daily.  * This list has 2 medication(s) that are the same as other medications   prescribed for you. Read the directions carefully, and ask your doctor or   other care provider to review them with you.     ASK your doctor about these medications     divalproex 500 mg EC  tablet; Commonly known as: Depakote; Take 2 tablets   (1,000 mg) by mouth once daily at bedtime. Do not crush, chew, or split.   LORazepam 0.5 mg tablet; Commonly known as: Ativan; Take 1 tablet (0.5   mg) by mouth every 8 hours if needed for anxiety.       Outpatient Follow-Up  No future appointments.    Valorie Mckeon DO  PGY 3  Family Medicine         [1] calcitriol, 0.5 mcg, oral, BID  calcium carbonate, 1,250 mg of calcium carbonate, oral, 4x daily  cholecalciferol, 125 mcg, oral, Daily  ferrous sulfate, 1 tablet, oral, Daily with breakfast  fludrocortisone, 0.1 mg, oral, Daily  hydrocortisone, 10 mg, oral, Nightly  hydrocortisone, 10 mg, oral, Daily  hydrocortisone, 20 mg, oral, Daily  magnesium oxide, 400 mg of magnesium oxide, oral, Daily  pantoprazole, 40 mg, oral, Daily before breakfast  potassium chloride CR, 40 mEq, oral, Once  risperiDONE, 1 mg, oral, BID  sertraline, 25 mg, oral, Daily     [2]    [3] PRN medications: haloperidol lactate, melatonin

## 2025-07-03 NOTE — ASSESSMENT & PLAN NOTE
On chart review  Dc seroquel due to qtc  Did take risperidone so far  Pharmacy consult if needed  Please reach out to family, regarding dc planning     Recommend increasing risperidone, as was done  If agitated give haldol prn    I recommended to the medical team, to reach out to family for guardianship, sister to contact [facility] probate court, to request patient be placed on public guardian list, patient at high risk for readmissions, mental health med non compliance on discharge, per family, there is extensive documentation I the chart, last note per [facility]     Patient was recently at [facility], team will send referral today to [facility] for inpatient psychiatry for acute stabilization. Sister given recommendations for guardianship.

## 2025-07-03 NOTE — CARE PLAN
The patient's goals for the shift include      The clinical goals for the shift include patient will be medication compliant during shift      Problem: Pain - Adult  Goal: Verbalizes/displays adequate comfort level or baseline comfort level  Outcome: Progressing     Problem: Safety - Adult  Goal: Free from fall injury  Outcome: Progressing     Problem: Chronic Conditions and Co-morbidities  Goal: Patient's chronic conditions and co-morbidity symptoms are monitored and maintained or improved  Outcome: Progressing     Problem: Nutrition  Goal: Nutrient intake appropriate for maintaining nutritional needs  Outcome: Progressing

## 2025-07-03 NOTE — SIGNIFICANT EVENT
Application for Emergency Admission      Ready for Transfer?  Is the patient medically cleared for transfer to inpatient psychiatry: Yes  Has the patient been accepted to an inpatient psychiatric hospital: Yes    Application for Emergency Admission  IN ACCORDANCE WITH [place]10 [place]  The Chief Clinical Officer of: [facility] 7/3/2025 .11:18 AM    Reason for Hospitalization  The undersigned has reason to believe that: Kriss Malloy Is a mentally ill person subject to hospitalization by court order under division B Section 5122.01 of the Revised Code, i.e., this person:    1.Yes  Represents a substantial risk of physical harm to self as manifested by evidence of threats of, or attempts at, suicide or serious self-inflicted bodily harm    2.Yes Represents a substantial risk of physical harm to others as manifested by evidence of recent homicidal or other violent behavior, evidence of recent threats that place another in reasonable fear of violent behavior and serious physical harm, or other evidence of present dangerousness    3.Yes Represents a substantial and immediate risk of serious physical impairment or injury to self as manifested by  evidence that the person is unable to provide for and is not providing for the person's basic physical needs because of the person's mental illness and that appropriate provision for those needs cannot be made  immediately available in the community    4.Yes Would benefit from treatment in a hospital for his mental illness and is in need of such treatment as manifested by evidence of behavior that creates a grave and imminent risk to substantial rights of others or  himself.    5.Yes Would benefit from treatment as manifested by evidence of behavior that indicates all of the following:       (a) The person is unlikely to survive safely in the community without supervision, based on a clinical determination.       (b) The person has a history of lack of  compliance with treatment for mental illness and one of the following applies:      (i) At least twice within the thirty-six months prior to the filing of an affidavit seeking court-ordered treatment of the person under section 5122.111 of the Revised Code, the lack of compliance has been a significant factor in necessitating hospitalization in a hospital or receipt of services in a forensic or other mental health unit of a correctional facility, provided that the thirty-six-month period shall be extended by the length of any hospitalization or incarceration of the person that occurred within the thirty-six-month period.      (ii) Within the forty-eight months prior to the filing of an affidavit seeking court-ordered treatment of the person under section 5122.111 of the Revised Code, the lack of compliance resulted in one or more acts of serious violent behavior toward self or others or threats of, or attempts at, serious physical harm to self or others, provided that the forty-eight-month period shall be extended by the length of any hospitalization or incarceration of the person that occurred within the forty-eight-month period.      (c) The person, as a result of mental illness, is unlikely to voluntarily participate in necessary treatment.       (d) In view of the person's treatment history and current behavior, the person is in need of treatment in order to prevent a relapse or deterioration that would be likely to result in substantial risk of serious harm to the person or others.    (e) Represents a substantial risk of physical harm to self or others if allowed to remain at liberty pending examination.    Therefore, it is requested that said person be admitted to the above named facility.    STATEMENT OF BELIEF    Must be filled out by one of the following: a psychiatrist, licensed physician, licensed clinical psychologist, health or ,  or .  (Statement shall include the  "circumstances under which the individual was taken into custody and the reason for the person's belief that hospitalization is necessary. The statement shall also include a reference to efforts made to secure the individual's property at his residence if he was taken into custody there. Every reasonable and appropriate effort should be made to take this person into custody in the least conspicuous manner possible.)      \"Undifferentiated schizophrenia (Multi)  On chart review  Dc seroquel due to qtc  Did take risperidone so far  Pharmacy consult if needed  Please reach out to family, regarding dc planning      Recommend increasing risperidone, as was done  If agitated give haldol prn     I recommended to the medical team, to reach out to family for guardianship, sister to contact [facility] probate court, to request patient be placed on public guardian list, patient at high risk for readmissions, mental health med non compliance on discharge, per family, there is extensive documentation I the chart, last note per [facility]      Patient was recently at [facility], team will send referral today to [facility] for inpatient psychiatry for acute stabilization. Sister given recommendations for guardianship.        I spent 25 minutes in the professional and overall care of this patient.     Maria D bYarra MD\" (Inpatient Psychiatrist at [facility])      Alma Rosa Cox DO 7/3/2025     _____________________________________________________________   Place of Employment: [facility]    STATEMENT OF OBSERVATION BY PSYCHIATRIST, LICENSED PHYSICIAN, OR LICENSED CLINICAL PSYCHOLOGIST, IF APPLICABLE    Place of Observation (e.g., [facility], general hospital, office, emergency facility)  (If applicable, please complete)    Alma Rosa Cox DO 7/3/2025    _____________________________________________________________     "

## 2025-07-03 NOTE — PROGRESS NOTES
"Kriss Malloy is a 49 y.o. female on day 5 of admission presenting with Hypocalcemia.    Subjective              Objective     Physical Exam  Constitutional:       General: She is not in acute distress.     Appearance: Normal appearance.   HENT:      Head: Normocephalic and atraumatic.      Nose: Nose normal.      Mouth/Throat:      Mouth: Mucous membranes are moist.   Eyes:      Extraocular Movements: Extraocular movements intact.      Conjunctiva/sclera: Conjunctivae normal.   Cardiovascular:      Rate and Rhythm: Normal rate and regular rhythm.      Heart sounds: Normal heart sounds.   Pulmonary:      Effort: Pulmonary effort is normal. No respiratory distress.      Breath sounds: Normal breath sounds.   Abdominal:      General: Abdomen is flat. Bowel sounds are normal.      Palpations: Abdomen is soft.   Skin:     General: Skin is warm.   Neurological:      General: No focal deficit present.      Mental Status: She is alert.      Motor: Weakness present.      Comments: A&O x 2 to person and place   Psychiatric:         Attention and Perception: She perceives auditory hallucinations.         Behavior: Behavior is cooperative.         Thought Content: Thought content does not include homicidal or suicidal ideation.      Comments: Appears to be externally stimulated       Last Recorded Vitals  Blood pressure 128/63, pulse 52, temperature 36.9 °C (98.5 °F), temperature source Temporal, resp. rate 12, height 1.689 m (5' 6.5\"), weight 55.2 kg (121 lb 11.1 oz), SpO2 98%.  Intake/Output last 3 Shifts:  I/O last 3 completed shifts:  In: 300 (5.4 mL/kg) [P.O.:300]  Out: - (0 mL/kg)   Weight: 55.2 kg     Relevant Results    Scheduled medications  Scheduled Medications[1]  Continuous medications  Continuous Medications[2]  PRN medications  PRN Medications[3]    Results for orders placed or performed during the hospital encounter of 06/28/25 (from the past 24 hours)   CBC   Result Value Ref Range    WBC 10.2 4.4 - 11.3 " x10*3/uL    nRBC 0.0 0.0 - 0.0 /100 WBCs    RBC 3.31 (L) 4.00 - 5.20 x10*6/uL    Hemoglobin 9.7 (L) 12.0 - 16.0 g/dL    Hematocrit 29.6 (L) 36.0 - 46.0 %    MCV 89 80 - 100 fL    MCH 29.3 26.0 - 34.0 pg    MCHC 32.8 32.0 - 36.0 g/dL    RDW 15.5 (H) 11.5 - 14.5 %    Platelets 188 150 - 450 x10*3/uL   Phosphorus   Result Value Ref Range    Phosphorus 5.9 (H) 2.5 - 4.9 mg/dL   Magnesium   Result Value Ref Range    Magnesium 1.35 (L) 1.60 - 2.40 mg/dL   Comprehensive metabolic panel   Result Value Ref Range    Glucose 81 74 - 99 mg/dL    Sodium 141 136 - 145 mmol/L    Potassium 3.3 (L) 3.5 - 5.3 mmol/L    Chloride 105 98 - 107 mmol/L    Bicarbonate 27 21 - 32 mmol/L    Anion Gap 12 10 - 20 mmol/L    Urea Nitrogen 20 6 - 23 mg/dL    Creatinine 0.74 0.50 - 1.05 mg/dL    eGFR >90 >60 mL/min/1.73m*2    Calcium 7.3 (L) 8.6 - 10.3 mg/dL    Albumin 3.1 (L) 3.4 - 5.0 g/dL    Alkaline Phosphatase 72 33 - 110 U/L    Total Protein 5.2 (L) 6.4 - 8.2 g/dL    AST 23 9 - 39 U/L    Bilirubin, Total 0.4 0.0 - 1.2 mg/dL    ALT 38 7 - 45 U/L       ECG 12 lead  Result Date: 6/30/2025  Sinus bradycardia Low voltage QRS T wave abnormality, consider lateral ischemia Prolonged QT Abnormal ECG When compared with ECG of 28-JUN-2025 07:45, (unconfirmed) No significant change was found    ECG 12 lead  Result Date: 6/30/2025  Normal sinus rhythm T wave abnormality, consider lateral ischemia Abnormal ECG When compared with ECG of 28-JUN-2025 04:14, (unconfirmed) No significant change was found    ECG 12 lead  Result Date: 6/30/2025  Normal sinus rhythm T wave abnormality, consider lateral ischemia Prolonged QT Abnormal ECG When compared with ECG of 28-JUN-2025 05:18, (unconfirmed) No significant change was found    ECG 12 lead  Result Date: 6/30/2025  Normal sinus rhythm Nonspecific T wave abnormality Prolonged QT Abnormal ECG When compared with ECG of 27-MAY-2025 22:07, T wave inversion no longer evident in Inferior leads    CT angio chest for  pulmonary embolism  Result Date: 6/28/2025  STUDY: CT Angiogram of the Chest; 6/28/2025 8:16 INDICATION: Shortness of breath, elevated D-dimer. COMPARISON: 2/22/2025 XR Chest, 6/14/2023 CTA Chest. ACCESSION NUMBER(S): [id number] ORDERING CLINICIAN: ENMANUEL RIVAS TECHNIQUE:  CTA of the chest was performed with intravenous contrast. Images are reviewed and processed at a workstation according to the CT angiogram protocol with 3-D and/or MIP post processing imaging generated.  Omnipaque 350 75 mL was administered intravenously.  Automated mA/kV exposure control was utilized and patient examination was performed in strict accordance with principles of ALARA. FINDINGS: Pulmonary arteries are adequately opacified without acute or chronic filling defects.  The thoracic aorta is normal in course and caliber without dissection or aneurysm. The heart is normal in size without pericardial effusion.  Thoracic lymph nodes are not enlarged. There is no pleural effusion, pleural thickening, or pneumothorax. The airways are patent. Biapical pleural-parenchymal scarring.  Mild mosaic perfusion. Upper abdomen demonstrates no acute pathology. There are no acute fractures.  No suspicious bony lesions.    1.No pulmonary embolism or other acute intrathoracic process. 2.Mild mosaic perfusion suggestive of air trapping or small airways disease. Signed by Lai Ordaz MD    XR chest 1 view  Result Date: 6/28/2025  Interpreted By:  Finkelstein, Evan, STUDY: XR CHEST 1 VIEW;  6/28/2025 5:26 am   INDICATION: Signs/Symptoms:Chest Pain.     COMPARISON: None.   ACCESSION NUMBER(S): [id number]   ORDERING CLINICIAN: LOLA BARTLETT   FINDINGS:     CARDIOMEDIASTINAL SILHOUETTE: Cardiomediastinal silhouette is normal in size and configuration.   LUNGS: No pulmonary consolidation, pleural effusion or pneumothorax.   ABDOMEN: No remarkable upper abdominal findings.   BONES: No acute osseous abnormality.       No radiographic evidence of acute  cardiopulmonary pathology.   MACRO: None.   Signed by: Evan Finkelstein 6/28/2025 5:28 AM Dictation workstation:   [id number]    XR chest 1 view  Result Date: 6/11/2025  * * *Final Report* * * DATE OF EXAM: Jun 11 2025  8:20AM   RHX   [id number]  -  XR CHEST 1V FRONTAL PORT  / ACCESSION #  [id number] PROCEDURE REASON: Chest pain, nonspecific      * * * * Physician Interpretation * * * *  EXAMINATION:  CHEST RADIOGRAPH (PORTABLE SINGLE VIEW AP) Exam Date/Time:  6/11/2025 8:20 AM CLINICAL HISTORY: Chest pain, nonspecific, Other, Hypotension MQ:  XCPR_5 Comparison:  None RESULT: Lines, tubes, and devices:  None. Lungs and pleura:  No consolidation, pleural effusion, pneumothorax, pulmonary nodules, or pulmonary edema Cardiomediastinal silhouette:  Stable cardiomediastinal silhouette. Other:  The osseous structures are unremarkable    IMPRESSION: No acute cardiopulmonary disease : PSCKEERTHI   Transcribe Date/Time: Jun 11 2025  8:36A Dictated by : KARYN WREN MD This examination was interpreted and the report reviewed and electronically signed by: KARYN WREN MD on Jun 11 2025  8:37AM  EST           Assessment & Plan      Kriss Malloy is a 49 year old female with PMH of [place]'s disease with hypocalcemia, hypothyroidism, and paranoid schizophrenia with schizoaffective disorder, admitted for hypocalcemia.    #Melchor's disease with hypocalcemia  -continues to complain of tingling but improved weakness   -endo following   -s/p 2 doses Calcium gluconate 1 gm IV for symptomatic hypocalcemia  -taper hydrocortisone to 20 mg QAM, 10 mg noon, 10 mg QPM  -continue calcitriol 0.5 mcg BID  -Calcium carbonate 1250 mg (500 mg elemental) 4 times daily   -continue florinef 0.1 mg once daily   -Goal Calcium >7  -Discharge hydrocortisone regimen: 15 mg QAM, 10 mg afternoon and 5 mg PM   -will follow serial calcium, magnesium, phosphorus  -repeat albumin      Paranoid schizophrenia   - continue risperidone 1 mg BID    - started zoloft 25 mg daily  per patient's sister's request for potential anxiety (patient declining)   - psych consulted, despite constant auditory hallucinations, appears this is chronic for patient and therefore does not meet inpt criteria                  [1]  calcitriol, 0.5 mcg, oral, BID  calcium carbonate, 1,250 mg of calcium carbonate, oral, 4x daily  cholecalciferol, 125 mcg, oral, Daily  ferrous sulfate, 1 tablet, oral, Daily with breakfast  fludrocortisone, 0.1 mg, oral, Daily  hydrocortisone, 10 mg, oral, Nightly  hydrocortisone, 10 mg, oral, Daily  hydrocortisone, 20 mg, oral, Daily  magnesium sulfate, 2 g, intravenous, Once  pantoprazole, 40 mg, oral, Daily before breakfast  potassium chloride CR, 40 mEq, oral, Once  risperiDONE, 1 mg, oral, BID  sertraline, 25 mg, oral, Daily     [2]     [3]  PRN medications: haloperidol lactate, melatonin

## 2025-07-03 NOTE — NURSING NOTE
Called report to generations and talked with nurse Shelly. patient to be picked up at 3:30. No IV.

## 2025-07-03 NOTE — PROGRESS NOTES
07/03/25 1026   Discharge Planning   Assistance Needed Pt medically clear, will send referral to [facility]. Re: guardianship, there is no public guardian list for pt to be put on in [facility].  I called to sister again about guardianship, and she states no one in the family wants to take the responsibility of guardianship because pt will just argue and argue with them.  I asked if family would be interested in a public guardian and let her know there will be  fees and other costs.  She said if pt is agreeable to guardianship they would do this.  There are no attorneys in [facility] that will do this pro marcello, there is  that might assist.  Let sister know we are referring pt to [facility] and if they do not accept her, pt will discharge to home.  Sister states she can come home and pt is responsible for taking her Sevier's medication and her Zoloft.  Will follow.

## 2025-07-11 ENCOUNTER — HOSPITAL ENCOUNTER (EMERGENCY)
Facility: HOSPITAL | Age: 50
Discharge: HOME | End: 2025-07-11
Payer: COMMERCIAL

## 2025-07-11 PROCEDURE — 4500999001 HC ED NO CHARGE

## 2025-07-12 LAB
ATRIAL RATE: 48 BPM
P AXIS: 17 DEGREES
P OFFSET: 183 MS
P ONSET: 152 MS
PR INTERVAL: 138 MS
Q ONSET: 221 MS
QRS COUNT: 8 BEATS
QRS DURATION: 68 MS
QT INTERVAL: 598 MS
QTC CALCULATION(BAZETT): 534 MS
QTC FREDERICIA: 555 MS
R AXIS: 62 DEGREES
T AXIS: 134 DEGREES
T OFFSET: 520 MS
VENTRICULAR RATE: 48 BPM

## 2025-07-13 ENCOUNTER — HOSPITAL ENCOUNTER (EMERGENCY)
Facility: HOSPITAL | Age: 50
Discharge: HOME | End: 2025-07-14
Payer: COMMERCIAL

## 2025-07-13 PROCEDURE — 99281 EMR DPT VST MAYX REQ PHY/QHP: CPT

## 2025-07-13 PROCEDURE — 99283 EMERGENCY DEPT VISIT LOW MDM: CPT

## 2025-07-14 ENCOUNTER — HOSPITAL ENCOUNTER (EMERGENCY)
Dept: CARDIOLOGY | Facility: HOSPITAL | Age: 50
Discharge: HOME | End: 2025-07-14
Payer: COMMERCIAL

## 2025-07-14 ENCOUNTER — APPOINTMENT (OUTPATIENT)
Dept: CARDIOLOGY | Facility: HOSPITAL | Age: 50
End: 2025-07-14
Payer: COMMERCIAL

## 2025-07-14 VITALS
BODY MASS INDEX: 19.29 KG/M2 | HEART RATE: 55 BPM | OXYGEN SATURATION: 100 % | RESPIRATION RATE: 18 BRPM | SYSTOLIC BLOOD PRESSURE: 144 MMHG | WEIGHT: 120 LBS | DIASTOLIC BLOOD PRESSURE: 88 MMHG | HEIGHT: 66 IN | TEMPERATURE: 97.7 F

## 2025-07-14 PROCEDURE — 93005 ELECTROCARDIOGRAM TRACING: CPT

## 2025-07-14 NOTE — ED TRIAGE NOTES
Pt presents to department with numbness and tingling and missing medication doses for the past week for her marcial's disease. No acute distress noted, respirations are even and unlabored, VSS

## 2025-07-15 LAB
ATRIAL RATE: 53 BPM
P AXIS: 46 DEGREES
P OFFSET: 188 MS
P ONSET: 146 MS
PR INTERVAL: 158 MS
Q ONSET: 225 MS
QRS COUNT: 9 BEATS
QRS DURATION: 66 MS
QT INTERVAL: 438 MS
QTC CALCULATION(BAZETT): 410 MS
QTC FREDERICIA: 420 MS
R AXIS: 34 DEGREES
T AXIS: 81 DEGREES
T OFFSET: 444 MS
VENTRICULAR RATE: 53 BPM

## 2025-07-19 LAB
ATRIAL RATE: 62 BPM
ATRIAL RATE: 63 BPM
ATRIAL RATE: 73 BPM
P AXIS: 24 DEGREES
P AXIS: 29 DEGREES
P AXIS: 57 DEGREES
P OFFSET: 184 MS
P OFFSET: 186 MS
P OFFSET: 192 MS
P ONSET: 154 MS
P ONSET: 155 MS
P ONSET: 164 MS
PR INTERVAL: 116 MS
PR INTERVAL: 132 MS
PR INTERVAL: 134 MS
Q ONSET: 221 MS
Q ONSET: 221 MS
Q ONSET: 222 MS
QRS COUNT: 10 BEATS
QRS COUNT: 11 BEATS
QRS COUNT: 12 BEATS
QRS DURATION: 70 MS
QRS DURATION: 74 MS
QRS DURATION: 74 MS
QT INTERVAL: 488 MS
QT INTERVAL: 532 MS
QT INTERVAL: 560 MS
QTC CALCULATION(BAZETT): 537 MS
QTC CALCULATION(BAZETT): 539 MS
QTC CALCULATION(BAZETT): 573 MS
QTC FREDERICIA: 521 MS
QTC FREDERICIA: 538 MS
QTC FREDERICIA: 569 MS
R AXIS: 57 DEGREES
R AXIS: 71 DEGREES
R AXIS: 78 DEGREES
T AXIS: 103 DEGREES
T AXIS: 129 DEGREES
T AXIS: 129 DEGREES
T OFFSET: 465 MS
T OFFSET: 488 MS
T OFFSET: 501 MS
VENTRICULAR RATE: 62 BPM
VENTRICULAR RATE: 63 BPM
VENTRICULAR RATE: 73 BPM

## 2025-07-22 ENCOUNTER — APPOINTMENT (OUTPATIENT)
Dept: CARDIOLOGY | Facility: HOSPITAL | Age: 50
End: 2025-07-22
Payer: COMMERCIAL

## 2025-07-22 ENCOUNTER — HOSPITAL ENCOUNTER (EMERGENCY)
Facility: HOSPITAL | Age: 50
Discharge: LEFT BEFORE DISCHARGE | End: 2025-07-22
Payer: COMMERCIAL

## 2025-07-22 VITALS
TEMPERATURE: 98.4 F | HEART RATE: 67 BPM | BODY MASS INDEX: 19.29 KG/M2 | OXYGEN SATURATION: 97 % | DIASTOLIC BLOOD PRESSURE: 58 MMHG | SYSTOLIC BLOOD PRESSURE: 118 MMHG | RESPIRATION RATE: 18 BRPM | WEIGHT: 120 LBS | HEIGHT: 66 IN

## 2025-07-22 PROCEDURE — 93005 ELECTROCARDIOGRAM TRACING: CPT

## 2025-07-22 PROCEDURE — 99281 EMR DPT VST MAYX REQ PHY/QHP: CPT

## 2025-07-22 ASSESSMENT — PAIN SCALES - GENERAL: PAINLEVEL_OUTOF10: 0 - NO PAIN

## 2025-07-22 ASSESSMENT — PAIN - FUNCTIONAL ASSESSMENT: PAIN_FUNCTIONAL_ASSESSMENT: 0-10

## 2025-07-23 LAB
ATRIAL RATE: 68 BPM
P AXIS: 51 DEGREES
P OFFSET: 188 MS
P ONSET: 154 MS
PR INTERVAL: 142 MS
Q ONSET: 225 MS
QRS COUNT: 12 BEATS
QRS DURATION: 74 MS
QT INTERVAL: 478 MS
QTC CALCULATION(BAZETT): 508 MS
QTC FREDERICIA: 498 MS
R AXIS: 62 DEGREES
T AXIS: 102 DEGREES
T OFFSET: 464 MS
VENTRICULAR RATE: 68 BPM

## 2025-08-04 ENCOUNTER — HOSPITAL ENCOUNTER (EMERGENCY)
Facility: HOSPITAL | Age: 50
Discharge: LEFT BEFORE DISCHARGE | End: 2025-08-04
Payer: COMMERCIAL

## 2025-08-04 PROCEDURE — 4500999001 HC ED NO CHARGE
